# Patient Record
Sex: FEMALE | Race: WHITE | Employment: OTHER | URBAN - METROPOLITAN AREA
[De-identification: names, ages, dates, MRNs, and addresses within clinical notes are randomized per-mention and may not be internally consistent; named-entity substitution may affect disease eponyms.]

---

## 2018-02-01 ENCOUNTER — HOSPITAL ENCOUNTER (OUTPATIENT)
Dept: LAB | Age: 83
Discharge: HOME | End: 2018-02-01
Payer: MEDICARE

## 2018-02-01 ENCOUNTER — OFFICE VISIT (OUTPATIENT)
Dept: FAMILY MEDICINE CLINIC | Age: 83
End: 2018-02-01

## 2018-02-01 VITALS
BODY MASS INDEX: 33.7 KG/M2 | TEMPERATURE: 97.8 F | WEIGHT: 190.2 LBS | HEIGHT: 63 IN | HEART RATE: 71 BPM | DIASTOLIC BLOOD PRESSURE: 70 MMHG | OXYGEN SATURATION: 98 % | RESPIRATION RATE: 18 BRPM | SYSTOLIC BLOOD PRESSURE: 152 MMHG

## 2018-02-01 DIAGNOSIS — M10.9 GOUT, UNSPECIFIED CAUSE, UNSPECIFIED CHRONICITY, UNSPECIFIED SITE: ICD-10-CM

## 2018-02-01 DIAGNOSIS — I10 HTN, GOAL BELOW 150/90: ICD-10-CM

## 2018-02-01 DIAGNOSIS — B18.2 CHRONIC HEPATITIS C WITHOUT HEPATIC COMA (HCC): ICD-10-CM

## 2018-02-01 DIAGNOSIS — Z13.820 SCREENING FOR OSTEOPOROSIS: ICD-10-CM

## 2018-02-01 DIAGNOSIS — Z23 ENCOUNTER FOR IMMUNIZATION: ICD-10-CM

## 2018-02-01 DIAGNOSIS — Z00.00 ENCOUNTER FOR MEDICARE ANNUAL WELLNESS EXAM: Primary | ICD-10-CM

## 2018-02-01 DIAGNOSIS — Z78.0 POSTMENOPAUSAL: ICD-10-CM

## 2018-02-01 PROCEDURE — 36415 COLL VENOUS BLD VENIPUNCTURE: CPT

## 2018-02-01 PROCEDURE — 87521 HEPATITIS C PROBE&RVRS TRNSC: CPT

## 2018-02-01 PROCEDURE — 84550 ASSAY OF BLOOD/URIC ACID: CPT

## 2018-02-01 PROCEDURE — 86803 HEPATITIS C AB TEST: CPT

## 2018-02-01 PROCEDURE — 80053 COMPREHEN METABOLIC PANEL: CPT

## 2018-02-01 RX ORDER — OMEPRAZOLE 20 MG/1
20 CAPSULE, DELAYED RELEASE ORAL AS NEEDED
COMMUNITY
End: 2018-02-14 | Stop reason: SDUPTHER

## 2018-02-01 RX ORDER — VALSARTAN 160 MG/1
320 TABLET ORAL DAILY
COMMUNITY
End: 2018-02-12 | Stop reason: SDUPTHER

## 2018-02-01 RX ORDER — METOPROLOL TARTRATE 50 MG/1
50 TABLET ORAL 2 TIMES DAILY
COMMUNITY
End: 2018-02-01

## 2018-02-01 RX ORDER — ALLOPURINOL 100 MG/1
TABLET ORAL
Refills: 1 | COMMUNITY
Start: 2018-01-18 | End: 2018-02-12 | Stop reason: SDUPTHER

## 2018-02-01 RX ORDER — HYDROCHLOROTHIAZIDE 25 MG/1
12.5 TABLET ORAL
COMMUNITY
End: 2018-02-12 | Stop reason: SDUPTHER

## 2018-02-01 RX ORDER — METOPROLOL SUCCINATE 50 MG/1
50 TABLET, EXTENDED RELEASE ORAL DAILY
COMMUNITY
End: 2018-02-12 | Stop reason: SDUPTHER

## 2018-02-01 RX ORDER — NITROGLYCERIN 0.4 MG/1
0.4 TABLET SUBLINGUAL
COMMUNITY
End: 2018-02-14 | Stop reason: SDUPTHER

## 2018-02-01 RX ORDER — METOPROLOL TARTRATE 25 MG/1
50 TABLET, FILM COATED ORAL DAILY
COMMUNITY
End: 2018-02-01

## 2018-02-01 RX ORDER — METRONIDAZOLE 10 MG/G
GEL TOPICAL
Refills: 2 | COMMUNITY
Start: 2017-12-18 | End: 2018-04-21

## 2018-02-01 RX ORDER — DICLOFENAC POTASSIUM 50 MG/1
50 TABLET, FILM COATED ORAL
COMMUNITY
End: 2020-02-18 | Stop reason: SDUPTHER

## 2018-02-01 NOTE — PROGRESS NOTES
Patient Name: Kasandra Rodriguez   MRN: [id number]    Aaliyah Wilson is a 80 y.o. female who presents with the following: Here to establish care with new PCP. Patient was offered certified  services; patient refused and signed informed refusal form. Interview was conducted between provider and patient's daughter. Transferring care from her prior PCP. Has a history of chronic kidney disease and gout. Her gout overall has remained under control until recently, she had a gout flareup in her right knee which required diclofenac, and an old prescription of a narcotic. States that her symptoms have improved at this time. Takes allopurinol 200 mg daily; has not increased the dose in the past.  Has a history of hepatitis C secondary to a blood transfusion. Daughter reports that she never received treatment but has had monitoring of her liver function and ultrasound about 6 months ago. No history of stroke or heart attack. Is currently on hydrochlorothiazide which is helpful for blood pressure as well as some leg swelling. Daughter reports that she is up-to-date on her tetanus and pneumonia vaccines. Has never had a DEXA scan before to screen for osteoporosis. Review of Systems   Constitutional: Negative for chills, fever, malaise/fatigue and weight loss. HENT: Negative for hearing loss, nosebleeds and sore throat. Respiratory: Negative for cough, sputum production, shortness of breath and wheezing. Cardiovascular: Negative for chest pain, palpitations, leg swelling and PND. Gastrointestinal: Negative for abdominal pain, blood in stool, constipation, diarrhea, nausea and vomiting. Genitourinary: Negative for dysuria, frequency and urgency. Musculoskeletal: Positive for joint pain. Negative for back pain, falls, myalgias and neck pain. Skin: Negative for itching and rash.    Neurological: Negative for dizziness, sensory change, focal weakness and loss of consciousness. Psychiatric/Behavioral: Negative for depression. The patient is not nervous/anxious. All other systems reviewed and are negative. The patient's medications, allergies, past medical history, surgical history, family history and social history were reviewed and updated where appropriate. Prior to Admission medications    Medication Sig Start Date End Date Taking? Authorizing Provider   allopurinol (ZYLOPRIM) 100 mg tablet TK 1 T PO BID 1/18/18  Yes Historical Provider   metroNIDAZOLE 1 % glwp DANYELLE EXT AA QD 12/18/17  Yes Historical Provider   diclofenac potassium (CATAFLAM) 50 mg tablet Take 50 mg by mouth two (2) times daily as needed. Yes Historical Provider   nitroglycerin (NITROSTAT) 0.4 mg SL tablet 0.4 mg by SubLINGual route every five (5) minutes as needed for Chest Pain. Yes Historical Provider   metoprolol tartrate (LOPRESSOR) 25 mg tablet Take 50 mg by mouth daily. Yes Historical Provider   omeprazole (PRILOSEC) 20 mg capsule Take 20 mg by mouth two (2) times a day. Yes Historical Provider   valsartan (DIOVAN) 160 mg tablet Take 160 mg by mouth two (2) times a day. Yes Historical Provider   hydroCHLOROthiazide (HYDRODIURIL) 25 mg tablet Take 12.5 mg by mouth. Yes Historical Provider       Allergies   Allergen Reactions    Ace Inhibitors Cough    Pcn [Penicillins] Rash           OBJECTIVE    Visit Vitals    /70 (BP 1 Location: Right arm, BP Patient Position: Sitting)    Pulse 71    Temp 97.8 °F (36.6 °C) (Oral)    Resp 18    Ht 5' 3\" (1.6 m)    Wt 190 lb 3.2 oz (86.3 kg)    SpO2 98%    BMI 33.69 kg/m2       Physical Exam   Constitutional: She is well-developed, well-nourished, and in no distress. No distress. Eyes: Conjunctivae and EOM are normal. Pupils are equal, round, and reactive to light. Cardiovascular: Normal rate, regular rhythm and normal heart sounds. Exam reveals no gallop and no friction rub. No murmur heard.   Pulmonary/Chest: Effort normal and breath sounds normal. No respiratory distress. She has no wheezes. Musculoskeletal:        Right knee: Normal. She exhibits normal range of motion, no swelling and no effusion. No tenderness found. Left knee: Normal. She exhibits normal range of motion, no swelling and no effusion. No tenderness found. Skin: She is not diaphoretic. Psychiatric: Mood, memory, affect and judgment normal.   Nursing note and vitals reviewed. ASSESSMENT AND PLAN  Brittany Garcia is a 80 y.o. female who presents today for:    1. Encounter for Medicare annual wellness exam  See other note. 2. Postmenopausal  - DEXA BONE DENSITY STUDY AXIAL; Future    3. Screening for osteoporosis  - DEXA BONE DENSITY STUDY AXIAL; Future    4. HTN, goal below 150/90  Mildly elevated today but will keep medications the same given that it is our first initial visit. Discussed with daughter that HCTZ may increase her risk of gout therefore may consider switching this medication in the future; daughter is hesitant given her history of leg swelling. 5. Gout, unspecified cause, unspecified chronicity, unspecified site  If uric acid is not at goal, consider increasing allopurinol to 300 mg daily.  - METABOLIC PANEL, COMPREHENSIVE  - URIC ACID    6. Chronic hepatitis C without hepatic coma ([facility] [address]  Will obtain prior records of last abdominal ultrasound as well as updated HCV labs. - HCV RNA REJI QUALITATIVE  - CHRONIC HEPATITIS PANEL    7. Encounter for immunization  Will review prior records.   - Influenza virus vaccine ([address]) 72 years and older ([address]34)  - Administration fee () for Medicare insured patients       Medications Discontinued During This Encounter   Medication Reason    naproxen (NAPROSYN) 375 mg tablet Not A Current Medication    metoprolol tartrate (LOPRESSOR) 25 mg tablet Not A Current Medication    metoprolol tartrate (LOPRESSOR) 50 mg tablet Not A Current Medication       Follow-up Disposition:  Return in about 3 months (around 5/1/2018) for HTN follow up. Medication risks/benefits/costs/interactions/alternatives discussed with patient. Advised patient to call back or return to office if symptoms worsen/change/persist. If patient cannot reach us or should anything more severe/urgent arise he/she should proceed directly to the nearest emergency department. Discussed expected course/resolution/complications of diagnosis in detail with patient. Patient given a written after visit summary which includes his/her diagnoses, current medications and vitals. Patient expressed understanding with the diagnosis and plan.      Candelaria Bearden M.D.

## 2018-02-01 NOTE — PROGRESS NOTES
Chief Complaint   Patient presents with    New Patient   [address] transfer     1. Have you been to the ER, urgent care clinic since your last visit? Hospitalized since your last visit? No    2. Have you seen or consulted any other health care providers outside of the [address] [place] [address] since your last visit? Include any pap smears or colon screening.  No

## 2018-02-01 NOTE — PATIENT INSTRUCTIONS
???????: ??????????? ?????????? - [ Gout: Care Instructions ]  ?????????? ?? ??????? ?????  ???????  ??? ????? ???????, ????????????? ??????????? ?????????? ??????? ??????? ? ???????. ??? ???????? ????????? ???????? ????, ?????????, ??????????? ? ???????????????, ?????? ? ????? ???????, ???????? ???????? ?????? ????.  ??????? ?????? ????????? ??? ???????. ?? ?? ????? ????????????? ???????????? ???????? (???????? ????) ??? ????????????? ? ???????? ????. ????? ???????????? ????????, ???????? ?????????? ??? ????????, ????? ????? ??????? ??????? ???????.  ????? ???????? ? ???????????? ? ????????????? ? ?????????? ?????????? ????? ????? ?????? ????????????? ???????? ??????? ? ???????.  ??????????? ?????????? ? ????  ?????? ????? ?????? ??????? ? ????? ?????? ????????.??????????? ????????? ?????, ???? ??? ??? ???????? ?????. ??? ????????? ??????? ????????? ?????? ?????. ????????????? ????? ?????? ????????, ??????? ?? ??????????, ? ????? ?? ????? ?????????? ????? ????????.  ??? ???????? ? ???????? ?????????  · ???? ?????? ???????, ????????????? ? ???? ??? ??? ???????? ???????? ???????? ?? 1020 ?????. ?????? ?? ????? ??????? ???????????? ? ???? ????? ?????? ?????.  · ????????????? ??????? ??? ??????? ????, ????? ?? ????????????? ? ??? ???, ? ????? ??????, ????? ?? ?????? ??? ?????? ? ??????? ?????? 3 ????. ?????????? ??????? ???? ???? ?????? ??????. ??? ??????? ????????? ????.  · ??????? ???????? ??????? ????????. ? ??????? ?????????? ???? ?? ?????????? ????????, ??????? ???????? ? ???????? ??? ?????????? ???? ????????. ? ??????? ??? ??????????? ???????? ???????? ??? ?????? ?? ???????? ????? ????????????.  · ?????????? ????????? ?????? ???????? ??????????? ?????. ????????? ?????? ?????, ???? ????????, ??? ? ??? ????????? ???????? ??-?? ????????.  · ?????????? ?????????????? ????????? ? ???????????? ? ??????????.  ¨ ???? ???? ???????? ??? ?????????????? ????????, ?????????? ??? ???????? ??????????? ?????.  ¨ ???? ??? ?? ???????? ?????????????? ????????, ???????? ???????? ?????, ????? ?? ??? ????????? ????????, ??????????? ??? ???????.  · ????? ?????? ????????????? ? ???????? ????.  · ??????????????????? ?? ????? ??????, ?????? ??? ??????????? ????????.  · ???????? ????, ???? ? ??? ??????? ?????????? ???, ????? ?????? ????????? ???????? ???????. ?? ?? ?????????? ????????? ??????? ?????. ???????????? ???????? ???? ?? ???????? ????? ????? ??????? ??????? ???????.  ????? ????? ?????????? ?? ????????  ?????????? ??????? ???????? ??????????? ?? ??????? ??????????? ???????:  · ??? ????????? ??????? ???????????;  · ???? ?????? ????????? ???????????, ??? ???????? ? ??? ??????????;  · ??? ?????????, ???????????? ????????, ???????????, ??????? ????????? ??????????? ??? ??????? ???? ? ????? ??? ????? ????????.  ??????????? ?????????? ?? ??????????? ?????? ????????? ? ??????????? ??????????? ? ???????? ?????, ????:  · ??????? ???? ? ???????.  · ???? ???????? ??????????? ??? ?? ?????????? ????? 34 ???.  ??? ????? ???????? ?????????????? ???????????  ????????? [email or web address] ????? W381 ? ?????? ?????? ?????????????? ?????????? \"???????: ??????????? ?????????? - [ Gout: Care Instructions ]. \"  ??????? ?????? ??: ??????? 31, 2016  ?????? ????????: 11.4  © 4821-0140 Healthwise, Incorporated. ??????????? ??????????, ?????????????? ? ???????????? ? ???????? ???????? ????? ??????. ???? ? ??? ????????? ??????? ? ?????-???? ??????????? ??? ?? ??????????, ?????????? ? ?????? ??????????, ??????? ?? ?????? ?????. ????????  Healthwise, Incorporated ?? ???? ???????? ? ?? ????? ??????? ??????????????? ?? ??????????? ????????????? ?????? ??????????.

## 2018-02-01 NOTE — ACP (ADVANCE CARE PLANNING)
Advance Care Planning:   Patient was offered the opportunity to discuss advance care planning:  no     Does patient have an Advance Directive:  no   If no, did you provide information on Caring Connections? yes     Conducted ACP discussion today. Advanced Care Planning Information Card copy provided to patient; he/she will reach out to NN/facilitator to review.

## 2018-02-01 NOTE — MR AVS SNAPSHOT
[address] [place] [address] [address] 
[phone] Patient: Madyson Perla MRN: [id number] :10/5/1929 Visit Information Date & Time Provider Department Dept. Phone Encounter #  
 2018  3:00 PM Celia Kirby  [place] [phone] [id number] Follow-up Instructions Return in about 3 months (around 2018) for HTN follow up. Upcoming Health Maintenance Date Due DTaP/Tdap/Td series (1 - Tdap) 10/5/1950 ZOSTER VACCINE AGE 60> 1989 GLAUCOMA SCREENING Q2Y 10/5/1994 OSTEOPOROSIS SCREENING (DEXA) 10/5/1994 Pneumococcal 65+ Low/Medium Risk (1 of 2 - PCV13) 10/5/1994 MEDICARE YEARLY EXAM 10/5/1994 Influenza Age 5 to Adult 2017 Allergies as of 2018  Review Complete On: 2018 By: Jeovanny Santiago Severity Noted Reaction Type Reactions Ace Inhibitors  10/10/2011    Cough Pcn [Penicillins]  10/10/2011    Rash Current Immunizations  Never Reviewed No immunizations on file. Not reviewed this visit You Were Diagnosed With   
  
 Codes Comments Postmenopausal    -  Primary ICD-10-CM: Z78.0 ICD-9-CM: V49.81 Screening for osteoporosis     ICD-10-CM: Z13.820 ICD-9-CM: V82.81   
 HTN, goal below 150/90     ICD-10-CM: I10 
ICD-9-CM: 401.9 Gout, unspecified cause, unspecified chronicity, unspecified site     ICD-10-CM: M10.9 ICD-9-CM: 274.9 Chronic hepatitis C without hepatic coma (HCC)     ICD-10-CM: B18.2 ICD-9-CM: 070.54 Vitals BP Pulse Temp Resp Height(growth percentile) Weight(growth percentile) 152/70 (BP 1 Location: Right arm, BP Patient Position: Sitting) 71 97.8 °F (36.6 °C) (Oral) 18 5' 3\" (1.6 m) 190 lb 3.2 oz (86.3 kg) SpO2 BMI Smoking Status 98% 33.69 kg/m2 Never Smoker Vitals History BMI and BSA Data  Body Mass Index Body Surface Area  
 33.69 kg/m 2 1.96 m 2  
  
  
 Preferred Pharmacy Pharmacy Name Phone [facility] [facility], [address] [place] [address] [phone] Your Updated Medication List  
  
   
This list is accurate as of: 2/1/18  3:45 PM.  Always use your most recent med list.  
  
  
  
  
 allopurinol 100 mg tablet Commonly known as:  Donald Umana TK 1 T PO BID  
  
 diclofenac potassium 50 mg tablet Commonly known as:  CATAFLAM  
Take 50 mg by mouth two (2) times daily as needed. hydroCHLOROthiazide 25 mg tablet Commonly known as:  HYDRODIURIL Take 12.5 mg by mouth.  
  
 metoprolol succinate 50 mg XL tablet Commonly known as:  TOPROL-XL Take  by mouth daily. metoprolol tartrate 50 mg tablet Commonly known as:  LOPRESSOR Take 50 mg by mouth two (2) times a day. metroNIDAZOLE 1 % Glwp DANYELLE EXT AA QD  
  
 nitroglycerin 0.4 mg SL tablet Commonly known as:  NITROSTAT  
0.4 mg by SubLINGual route every five (5) minutes as needed for Chest Pain. omeprazole 20 mg capsule Commonly known as:  PRILOSEC Take 20 mg by mouth two (2) times a day. valsartan 160 mg tablet Commonly known as:  DIOVAN Take 320 mg by mouth daily. We Performed the Following CHRONIC HEPATITIS PANEL [[id number] Custom] HCV RNA REJI QUALITATIVE [32288 CPT(R)] METABOLIC PANEL, COMPREHENSIVE [58965 CPT(R)] URIC ACID [id number] CPT(R)] Follow-up Instructions Return in about 3 months (around 5/1/2018) for HTN follow up. To-Do List   
 02/01/2018 Imaging:  DEXA BONE DENSITY STUDY AXIAL Patient Instructions   
 
  
???????: ??????????? ?????????? - [ Gout: Care Instructions ] ?????????? ?? ??????? ????? 
???????  ??? ????? ???????, ????????????? ??????????? ?????????? ??????? ??????? ? ???????. ??? ???????? ????????? ???????? ????, ?????????, ??????????? ? ???????????????, ?????? ? ????? ???????, ???????? ???????? ?????? ????. 
 ??????? ?????? ????????? ??? ???????. ?? ?? ????? ????????????? ???????????? ???????? (???????? ????) ??? ????????????? ? ???????? ????. ????? ???????????? ????????, ???????? ?????????? ??? ????????, ????? ????? ??????? ??????? ???????. 
????? ???????? ? ???????????? ? ????????????? ? ?????????? ?????????? ????? ????? ?????? ????????????? ???????? ??????? ? ???????. 
??????????? ?????????? ? ????  ?????? ????? ?????? ??????? ? ????? ?????? ????????.??????????? ????????? ?????, ???? ??? ??? ???????? ?????. ??? ????????? ??????? ????????? ?????? ?????. ????????????? ????? ?????? ????????, ??????? ?? ??????????, ? ????? ?? ????? ?????????? ????? ????????. 
??? ???????? ? ???????? ????????? 
· ???? ?????? ???????, ????????????? ? ???? ??? ??? ???????? ???????? ???????? ?? 1020 ?????. ?????? ?? ????? ??????? ???????????? ? ???? ????? ?????? ?????. 
· ????????????? ??????? ??? ??????? ????, ????? ?? ????????????? ? ??? ???, ? ????? ??????, ????? ?? ?????? ??? ?????? ? ??????? ?????? 3 ????. ?????????? ??????? ???? ???? ?????? ??????. ??? ??????? ????????? ????. 
· ??????? ???????? ??????? ????????. ? ??????? ?????????? ???? ?? ?????????? ????????, ??????? ???????? ? ???????? ??? ?????????? ???? ????????. ? ??????? ??? ??????????? ???????? ???????? ??? ?????? ?? ???????? ????? ????????????. 
· ?????????? ????????? ?????? ???????? ??????????? ?????. ????????? ?????? ?????, ???? ????????, ??? ? ??? ????????? ???????? ??-?? ????????. 
· ?????????? ?????????????? ????????? ? ???????????? ? ??????????. 
¨ ???? ???? ???????? ??? ?????????????? ????????, ?????????? ??? ???????? ??????????? ?????. 
¨ ???? ??? ?? ???????? ?????????????? ????????, ???????? ???????? ?????, ????? ?? ??? ????????? ????????, ??????????? ??? ???????. 
· ????? ?????? ????????????? ? ???????? ????. 
· ??????????????????? ?? ????? ??????, ?????? ??? ??????????? ????????. 
· ???????? ????, ???? ? ??? ??????? ?????????? ???, ????? ?????? ????????? ???????? ???????. ?? ?? ?????????? ????????? ??????? ?????. ???????????? ???????? ???? ?? ???????? ????? ????? ??????? ??????? ???????. 
????? ????? ?????????? ?? ???????? 
?????????? ??????? ???????? ??????????? ?? ??????? ??????????? ???????: 
· ??? ????????? ??????? ???????????; 
· ???? ?????? ????????? ???????????, ??? ???????? ? ??? ??????????; 
· ??? ?????????, ???????????? ????????, ???????????, ??????? ????????? ??????????? ??? ??????? ???? ? ????? ??? ????? ????????. 
??????????? ?????????? ?? ??????????? ?????? ????????? ? ??????????? ??????????? ? ???????? ?????, ????: 
· ??????? ???? ? ???????. 
· ???? ???????? ??????????? ??? ?? ?????????? ????? 34 ???. 
??? ????? ???????? ?????????????? ??????????? 
????????? [email or web address] ????? W680 ? ?????? ?????? ?????????????? ?????????? \"???????: ??????????? ?????????? - [ Gout: Care Instructions ]. \" 
??????? ?????? ??: ??????? 31, 2016 
?????? ????????: 11.4 © [id number] Healthwise, Incorporated. ??????????? ??????????, ?????????????? ? ???????????? ? ???????? ???????? ????? ??????. ???? ? ??? ????????? ??????? ? ?????-???? ??????????? ??? ?? ??????????, ?????????? ? ?????? ??????????, ??????? ?? ?????? ?????. ???????? [employer]wise, Incorporated ?? ???? ???????? ? ?? ????? ??????? ??????????????? ?? ??????????? ????????????? ?????? ??????????. Introducing [facility]! [facility] introduces hoohbe patient portal. Now you can access parts of your medical record, email your doctor's office, and request medication refills online. 1. In your internet browser, go to [email or web address] 2. Click on the First Time User? Click Here link in the Sign In box. You will see the New Member Sign Up page. 3. Enter your hoohbe Access Code exactly as it appears below. You will not need to use this code after youve completed the sign-up process.  If you do not sign up before the expiration date, you must request a new code. 
 
· [employer] Access Code: [email or web address] Expires: 5/2/2018  2:43 PM 
 
4. Enter the last four digits of your Social Security Number (xxxx) and Date of Birth (mm/dd/yyyy) as indicated and click Submit. You will be taken to the next sign-up page. 5. Create a [employer] ID. This will be your [employer] login ID and cannot be changed, so think of one that is secure and easy to remember. 6. Create a [employer] password. You can change your password at any time. 7. Enter your Password Reset Question and Answer. This can be used at a later time if you forget your password. 8. Enter your e-mail address. You will receive e-mail notification when new information is available in [address] 9. Click Sign Up. You can now view and download portions of your medical record. 10. Click the Download Summary menu link to download a portable copy of your medical information. If you have questions, please visit the Frequently Asked Questions section of the [employer] website. Remember, [employer] is NOT to be used for urgent needs. For medical emergencies, dial 911. Now available from your iPhone and Android! Please provide this summary of care documentation to your next provider. Your primary care clinician is listed as Cherri Alexis. If you have any questions after today's visit, please call [phone].

## 2018-02-01 NOTE — PROGRESS NOTES
Ciaran Guaman is a 80 y.o. female and presents for annual Medicare Wellness Visit. Problem List: Reviewed with patient and discussed risk factors. Patient Active Problem List   Diagnosis Code    HTN, goal below 150/90 I10    Gout M10.9    Chronic hepatitis C without hepatic coma (HCC) B18.2    Osteoarthritis M19.90       Current medical providers:  Patient Care Team:  Dawit Novak MD as PCP - General (Family Practice)  Yisel Mejia MD as Physician (Internal Medicine)    PSH: Reviewed with patient  Past Surgical History:   Procedure Laterality Date    HX HYSTERECTOMY          SH: Reviewed with patient  Social History   Substance Use Topics    Smoking status: Never Smoker    Smokeless tobacco: Never Used    Alcohol use No       FH: Reviewed with patient  Family History   Problem Relation Age of Onset    No Known Problems Mother     No Known Problems Father        Medications/Allergies: Reviewed with patient  No current outpatient prescriptions on file prior to visit. No current facility-administered medications on file prior to visit. Allergies   Allergen Reactions    Ace Inhibitors Cough    Pcn [Penicillins] Rash       Objective:  Visit Vitals    /70 (BP 1 Location: Right arm, BP Patient Position: Sitting)    Pulse 71    Temp 97.8 °F (36.6 °C) (Oral)    Resp 18    Ht 5' 3\" (1.6 m)    Wt 190 lb 3.2 oz (86.3 kg)    SpO2 98%    BMI 33.69 kg/m2    Body mass index is 33.69 kg/(m^2). Assessment of cognitive impairment: Alert and oriented x 3    Depression Screen:   PHQ over the last two weeks 2/1/2018   Little interest or pleasure in doing things Not at all   Feeling down, depressed or hopeless Not at all   Total Score PHQ 2 0       Fall Risk Assessment:    Fall Risk Assessment, last 12 mths 2/1/2018   Able to walk? Yes   Fall in past 12 months? Yes   Fall with injury?  Yes   Number of falls in past 12 months 1   Fall Risk Score 2       Functional Ability:   Does the patient exhibit a steady gait? yes   How long did it take the patient to get up and walk from a sitting position? 3   Is the patient self reliant?  (ie can do own laundry, meals, household chores)  yes     Does the patient handle his/her own medications? yes     Does the patient handle his/her own money? yes     Is the patients home safe (ie good lighting, handrails on stairs and bath, etc.)? yes     Did you notice or did patient express any hearing difficulties? no     Did you notice or did patient express any vision difficulties?   no     Were distance and reading eye charts used? no       Advance Care Planning:   Patient was offered the opportunity to discuss advance care planning:  yes     Does patient have an Advance Directive:  no   If no, did you provide information on Caring Connections?   yes       Plan:      Orders Placed This Encounter    DEXA BONE DENSITY STUDY AXIAL    Influenza virus vaccine (FLUZONE HIGH-DOSE) 72 years and older (09323)    METABOLIC PANEL, COMPREHENSIVE    URIC ACID    HCV RNA REJI QUALITATIVE    CHRONIC HEPATITIS PANEL    Administration fee () for Medicare insured patients    allopurinol (ZYLOPRIM) 100 mg tablet    metroNIDAZOLE 1 % glwp    diclofenac potassium (CATAFLAM) 50 mg tablet    nitroglycerin (NITROSTAT) 0.4 mg SL tablet    DISCONTD: metoprolol tartrate (LOPRESSOR) 25 mg tablet    omeprazole (PRILOSEC) 20 mg capsule    valsartan (DIOVAN) 160 mg tablet    hydroCHLOROthiazide (HYDRODIURIL) 25 mg tablet    metoprolol tartrate (LOPRESSOR) 50 mg tablet    metoprolol succinate (TOPROL-XL) 50 mg XL tablet       Health Maintenance   Topic Date Due    DTaP/Tdap/Td series (1 - Tdap) 10/05/1950    ZOSTER VACCINE AGE 60>  08/05/1989    GLAUCOMA SCREENING Q2Y  10/05/1994    OSTEOPOROSIS SCREENING (DEXA)  10/05/1994    Pneumococcal 65+ Low/Medium Risk (1 of 2 - PCV13) 10/05/1994    MEDICARE YEARLY EXAM  02/02/2019    Influenza Age 9 to Adult  Completed *Patient verbalized understanding and agreement with the plan. A copy of the After Visit Summary with personalized health plan was given to the patient today.

## 2018-02-02 ENCOUNTER — TELEPHONE (OUTPATIENT)
Dept: FAMILY MEDICINE CLINIC | Age: 83
End: 2018-02-02

## 2018-02-02 LAB
ALBUMIN SERPL-MCNC: 4 G/DL (ref 3.5–4.7)
ALBUMIN/GLOB SERPL: 1.4 {RATIO} (ref 1.2–2.2)
ALP SERPL-CCNC: 79 IU/L (ref 39–117)
ALT SERPL-CCNC: 39 IU/L (ref 0–32)
AST SERPL-CCNC: 59 IU/L (ref 0–40)
BILIRUB SERPL-MCNC: 0.7 MG/DL (ref 0–1.2)
BUN SERPL-MCNC: 38 MG/DL (ref 8–27)
BUN/CREAT SERPL: 30 (ref 12–28)
CALCIUM SERPL-MCNC: 9.8 MG/DL (ref 8.7–10.3)
CHLORIDE SERPL-SCNC: 102 MMOL/L (ref 96–106)
CO2 SERPL-SCNC: 22 MMOL/L (ref 18–29)
COMMENT, 144069: NORMAL
CREAT SERPL-MCNC: 1.28 MG/DL (ref 0.57–1)
GFR SERPLBLD CREATININE-BSD FMLA CKD-EPI: 37 ML/MIN/1.73
GFR SERPLBLD CREATININE-BSD FMLA CKD-EPI: 43 ML/MIN/1.73
GLOBULIN SER CALC-MCNC: 2.8 G/DL (ref 1.5–4.5)
GLUCOSE SERPL-MCNC: 98 MG/DL (ref 65–99)
HBV CORE AB SERPL QL IA: NEGATIVE
HBV CORE IGM SERPL QL IA: NEGATIVE
HBV E AB SERPL QL IA: NEGATIVE
HBV E AG SERPL QL IA: NEGATIVE
HBV SURFACE AB SER QL: NON REACTIVE
HBV SURFACE AG SERPL QL IA: NEGATIVE
HCV AB S/CO SERPL IA: >11 S/CO RATIO (ref 0–0.9)
HCV RNA SERPL QL NAA+PROBE: POSITIVE
INTERPRETATION: NORMAL
POTASSIUM SERPL-SCNC: 4.7 MMOL/L (ref 3.5–5.2)
PROT SERPL-MCNC: 6.8 G/DL (ref 6–8.5)
SODIUM SERPL-SCNC: 141 MMOL/L (ref 134–144)
URATE SERPL-MCNC: 6.8 MG/DL (ref 2.5–7.1)

## 2018-02-02 NOTE — TELEPHONE ENCOUNTER
Call to patient's daughter on phi form.  verified. She informed me patient began to have tooth ache last night. She denies swelling but there is tenderness to touch. She states she is actively looking for a dentist but in the meantime she would like for pcp to send in antibiotic for patient.  Advised eval may be needed but will send to pcp

## 2018-02-02 NOTE — TELEPHONE ENCOUNTER
Would recommend prn Tylenol and applying heat; would also use mouthwash BID. As long as she does not have fever or there is not pus drainage, would avoid abx for now but encourage close follow up with dentist soon so they can evaluate for infection.

## 2018-02-02 NOTE — TELEPHONE ENCOUNTER
Shawn Gaitan, daughter, is requesting an antibiotic for the pt tooth ache until she finds a dentist. Pt uses [address] on file.      Best contact number [phone]

## 2018-02-04 NOTE — PROGRESS NOTES
Please notify patient regarding their test results (may need to use telephone  or pt's daughter on HIPPA due to language barrier:    Hepatitis C virus remains present per blood work (pt already aware of diagnosis but has never received treatment before). There are also abnormally high liver markers. At this time, I recommend referral to hepatology Dr. Betty Santiago to discuss further testing and treatment. Please let me know if pt amenable so we can place referral order. She also is not immune to Hepatitis B; would recommend starting Hepatitis B vaccine series as she is at higher risk of jez Hepatitis B given her dx of Hepatitis C. Uric acid is mildly above goal; would hold off on changing allopurinol for now given CKD but if she has another episode, pt to call back as she may need to increase daily allopurinol dose from 200 mg to 300 mg. Kidney marker is high; unsure what baseline is. Would try low salt diet and avoid any NSAIDS (i.e. Advil or Aleve) as this can worsen kidney function. If pt prefers for me to explain hepatitis C rationale further, I'm happy to discuss further on the phone directly.

## 2018-02-05 NOTE — TELEPHONE ENCOUNTER
Call to cleo. patient's  verified. Informed her of recommendations.  She states all has been taken care of and they have established care with dentist

## 2018-02-07 ENCOUNTER — TELEPHONE (OUTPATIENT)
Dept: FAMILY MEDICINE CLINIC | Age: 83
End: 2018-02-07

## 2018-02-07 DIAGNOSIS — B18.2 CHRONIC HEPATITIS C WITHOUT HEPATIC COMA (HCC): Primary | ICD-10-CM

## 2018-02-07 NOTE — TELEPHONE ENCOUNTER
Called patient and talked to her daughter due to language barrier and explained lab results. They agreed to see Dr. Lucas Guillen for further treatment.

## 2018-02-07 NOTE — PROGRESS NOTES
Called patient and talked to her daughter due to language barrier and explained lab results. They agreed to see Dr. Krishan Whitlye for further treatment.

## 2018-02-12 RX ORDER — VALSARTAN 160 MG/1
320 TABLET ORAL DAILY
Qty: 180 TAB | Refills: 1 | Status: SHIPPED | OUTPATIENT
Start: 2018-02-12 | End: 2018-07-04 | Stop reason: SDUPTHER

## 2018-02-12 RX ORDER — METOPROLOL SUCCINATE 50 MG/1
50 TABLET, EXTENDED RELEASE ORAL DAILY
Qty: 90 TAB | Refills: 1 | Status: SHIPPED | OUTPATIENT
Start: 2018-02-12 | End: 2018-07-04 | Stop reason: SDUPTHER

## 2018-02-12 RX ORDER — DICLOFENAC POTASSIUM 50 MG/1
50 TABLET, FILM COATED ORAL
Qty: 60 TAB | Refills: 2 | Status: CANCELLED | OUTPATIENT
Start: 2018-02-12

## 2018-02-12 RX ORDER — METRONIDAZOLE 10 MG/G
GEL TOPICAL
Qty: 1 BOTTLE | Refills: 2 | Status: CANCELLED | OUTPATIENT
Start: 2018-02-12

## 2018-02-12 RX ORDER — OMEPRAZOLE 20 MG/1
20 CAPSULE, DELAYED RELEASE ORAL AS NEEDED
Qty: 30 CAP | Refills: 2 | Status: CANCELLED | OUTPATIENT
Start: 2018-02-12

## 2018-02-12 RX ORDER — HYDROCHLOROTHIAZIDE 25 MG/1
12.5 TABLET ORAL DAILY
Qty: 30 TAB | Refills: 3 | Status: CANCELLED | OUTPATIENT
Start: 2018-02-12

## 2018-02-12 RX ORDER — HYDROCHLOROTHIAZIDE 25 MG/1
12.5 TABLET ORAL DAILY
Qty: 90 TAB | Refills: 1 | Status: SHIPPED | OUTPATIENT
Start: 2018-02-12 | End: 2019-05-13 | Stop reason: SDUPTHER

## 2018-02-12 RX ORDER — METOPROLOL SUCCINATE 50 MG/1
50 TABLET, EXTENDED RELEASE ORAL DAILY
Qty: 30 TAB | Refills: 3 | Status: CANCELLED | OUTPATIENT
Start: 2018-02-12

## 2018-02-12 RX ORDER — ALLOPURINOL 100 MG/1
200 TABLET ORAL DAILY
Qty: 180 TAB | Refills: 1 | Status: SHIPPED | OUTPATIENT
Start: 2018-02-12 | End: 2019-08-19 | Stop reason: SDUPTHER

## 2018-02-12 RX ORDER — ALLOPURINOL 100 MG/1
100 TABLET ORAL 2 TIMES DAILY
Qty: 60 TAB | Refills: 1 | Status: CANCELLED | OUTPATIENT
Start: 2018-02-12

## 2018-02-12 RX ORDER — VALSARTAN 160 MG/1
320 TABLET ORAL DAILY
Qty: 30 TAB | Refills: 2 | Status: CANCELLED | OUTPATIENT
Start: 2018-02-12

## 2018-02-12 RX ORDER — NITROGLYCERIN 0.4 MG/1
0.4 TABLET SUBLINGUAL
Qty: 1 BOTTLE | Refills: 1 | Status: CANCELLED | OUTPATIENT
Start: 2018-02-12

## 2018-02-12 NOTE — TELEPHONE ENCOUNTER
Patients daughter  Jasmina Noon  called very upset that these prescriptions were not called in sooner. She had asked for all of her mothers medication be reordered at here last appt which was 18. She also wanted 90 day supply with 3 refills  [facility] # [id number]  Pharmacy on file verified  Requested Prescriptions     Pending Prescriptions Disp Refills    valsartan (DIOVAN) 160 mg tablet       Sig: Take 2 Tabs by mouth daily.  nitroglycerin (NITROSTAT) 0.4 mg SL tablet       Si Tab by SubLINGual route every five (5) minutes as needed for Chest Pain.  metoprolol succinate (TOPROL-XL) 50 mg XL tablet       Sig: Take 1 Tab by mouth daily.  hydroCHLOROthiazide (HYDRODIURIL) 25 mg tablet       Sig: Take 0.5 Tabs by mouth.  allopurinol (ZYLOPRIM) 100 mg tablet  1    metroNIDAZOLE 1 % glwp  2    omeprazole (PRILOSEC) 20 mg capsule       Sig: Take 1 Cap by mouth as needed.  diclofenac potassium (CATAFLAM) 50 mg tablet       Sig: Take 1 Tab by mouth two (2) times daily as needed.      Lian Dupont  18

## 2018-02-14 ENCOUNTER — DOCUMENTATION ONLY (OUTPATIENT)
Dept: FAMILY MEDICINE CLINIC | Age: 83
End: 2018-02-14

## 2018-02-14 RX ORDER — NITROGLYCERIN 0.4 MG/1
TABLET SUBLINGUAL
Qty: 30 TAB | Refills: 0 | Status: SHIPPED | OUTPATIENT
Start: 2018-02-14 | End: 2020-02-18 | Stop reason: SDUPTHER

## 2018-02-14 RX ORDER — OMEPRAZOLE 20 MG/1
20 CAPSULE, DELAYED RELEASE ORAL AS NEEDED
Qty: 90 CAP | Refills: 1 | Status: SHIPPED | OUTPATIENT
Start: 2018-02-14 | End: 2018-02-20 | Stop reason: SDUPTHER

## 2018-02-14 NOTE — PROGRESS NOTES
Reviewed records from former pcp Dr. Cheyanne Ruano. Chronic chest pains, uses prn NTG, pt does not want to go to cardiology. Med list is accurate, except prior list had prn trazodone 50 mg for insomnia; unsure if pt is still using it. Elevated A1c at 6.0 in 3/3017; consider repeat at future OV. Hx of untreated Hep C, PCP was monitoring AFP marker which remained high (last 24.2 ng/mL as of 3/2017). Has previously refused seeing liver doctor. Prior Cr in 3/2017 at 0.91, now increased; would minimize diclofenac and other NSAIDs. Daughter is a physician (Dr. Roosevelt Hollingsworth).

## 2018-02-15 LAB
AFP, SERUM, TUMOR MARKER, 002266: 24.2
ALT (SGPT)  (NASH), 13919: 40
AST SERPL W P-5'-P-CCNC: 58 U/L
HBA1C MFR BLD HPLC: 6 %
URATE MFR STONE: 7.3 %

## 2018-02-20 RX ORDER — OMEPRAZOLE 20 MG/1
20 CAPSULE, DELAYED RELEASE ORAL
Qty: 90 CAP | Refills: 1 | Status: SHIPPED | OUTPATIENT
Start: 2018-02-20 | End: 2018-07-04 | Stop reason: SDUPTHER

## 2018-05-03 ENCOUNTER — HOSPITAL ENCOUNTER (OUTPATIENT)
Dept: LAB | Age: 83
Discharge: HOME | End: 2018-05-03
Payer: MEDICARE

## 2018-05-03 ENCOUNTER — OFFICE VISIT (OUTPATIENT)
Dept: FAMILY MEDICINE CLINIC | Age: 83
End: 2018-05-03

## 2018-05-03 VITALS
HEIGHT: 63 IN | RESPIRATION RATE: 18 BRPM | DIASTOLIC BLOOD PRESSURE: 68 MMHG | WEIGHT: 188 LBS | SYSTOLIC BLOOD PRESSURE: 138 MMHG | OXYGEN SATURATION: 98 % | HEART RATE: 60 BPM | TEMPERATURE: 98.4 F | BODY MASS INDEX: 33.31 KG/M2

## 2018-05-03 DIAGNOSIS — B18.2 CHRONIC HEPATITIS C WITHOUT HEPATIC COMA (HCC): Primary | ICD-10-CM

## 2018-05-03 DIAGNOSIS — I10 HTN, GOAL BELOW 150/90: ICD-10-CM

## 2018-05-03 DIAGNOSIS — R60.0 LEG EDEMA: ICD-10-CM

## 2018-05-03 PROCEDURE — 36415 COLL VENOUS BLD VENIPUNCTURE: CPT

## 2018-05-03 PROCEDURE — 80048 BASIC METABOLIC PNL TOTAL CA: CPT

## 2018-05-03 PROCEDURE — 87522 HEPATITIS C REVRS TRNSCRPJ: CPT

## 2018-05-03 PROCEDURE — 83880 ASSAY OF NATRIURETIC PEPTIDE: CPT

## 2018-05-03 RX ORDER — FUROSEMIDE 20 MG/1
20 TABLET ORAL DAILY
Qty: 30 TAB | Refills: 0 | Status: SHIPPED | OUTPATIENT
Start: 2018-05-03 | End: 2018-09-14 | Stop reason: SDUPTHER

## 2018-05-03 RX ORDER — ACETAMINOPHEN AND CODEINE PHOSPHATE 300; 30 MG/1; MG/1
TABLET ORAL
Refills: 0 | COMMUNITY
Start: 2018-02-03 | End: 2019-08-19

## 2018-05-03 NOTE — PATIENT INSTRUCTIONS
????? DASH (??????????????? ?????? ? ??????? ??????????): ??????????? ?????????? - [ DASH Diet: Care Instructions ]  ?????????? ?? ??????? ?????  ????? DASH ???????????? ????? ???? ???????, ??????? ??????? ??????? ???????????? ????????. ?????????? DASH ???????? «??????????????? ?????? ? ??????? ??????????». ?????????? -- ??? ??????? ???????????? ????????.  ???????? ???? ????? DASH ??????????? ? ??????????? ?????????, ??????? ????????, ?????? ? ???????. ??? ???????? ????? ??????? ???????????? ????????. ??????????, ???????? ??????? ???????????? ??????????, ???????? ??????, ?????, ???????? ???????? ? ?????? ??????????? ????, ?????, ??????? ? ???????. ?????? ????? ??????? ??????? ? ????????, ?????? ? ??????? ?????? ???????????? ?????????, ??????? ??????? ??????????, ?? ???????? ?????? ?? ???????. ????? ????, ????? DASH ???????? ??????? ?????, ???? ? ?????.  ????? DASH ???????? ????? ?? ?????????? ??????????? ????????? ?????? ?????, ??????? ??? ???? ????? ??????????????? ??? ???????? ????????????? ????????. ????? ??? ????, ????????, ????????? ????????? ?????????? ?????? ? ???????????? ???????. ?????????? ?????????? ?????? ??? ?????????? ????? DASH ????? ??????? ???????????? ???????? ??? ???????, ??? ????? DASH ???? ?? ????.  ??????????? ?????????? ? ???? -- ?????? ????? ?????? ??????? ? ????? ?????? ????????.??????????? ????????? ?????, ???? ??? ??? ???????? ?????. ??? ????????? ??????? ????????? ?????? ?????. ????????????? ????? ?????? ????????, ??????? ?? ??????????, ? ????? ?? ????? ?????????? ????? ????????.  ??? ???????? ? ???????? ?????????  ?????????? ????? DASH  · ?????????? ???? 4-5 ?????? ??????? ?????? ????. ??????? ???????? 1 ??????? ?????? ???????? ???????, ½ ????? ????? ?????????? ??? ???????????????? ???????, 1/4 ????? ??????????? ??? 100 ? (½ ?????) ?????????? ????. ???????????? ??????? ???????? ???????, ? ?? ????????? ?????.  · ?????????? ???? 4-5 ?????? ?????? ?????? ????. ??????? ???????? 1 ????? ?????? ????? ??? ????? ???????? ??????, ½ ????? ????? ?????????? ??? ?????????? ???????????? ?????? ??? 100 ? (½ ?????) ???????? ????. ???????????? ??????? ???????? ??????, ? ?? ??????? ?????.  · ????????? ????? 2-3 ?????? ???????? ????????? ? ?????? ??????????? ????? ??? ????????????. ??????? ???????? 200 ? ??????, 1 ????? ??????? ??? 40 ? ????.  · ?????????? ???? 6-8 ?????? ???????? ?????? ????. ??????? ???????? 1 ????? ?????, 40 ? ????? ???????? ???? ??? ½ ????? ??????????????? ????, ??????? ??? ????. ?????????? ?? ??????????? ???????? ?????????????? ????????.  · ?????????? ??????????? ???????? ????, ????? ? ???? ?? 2 ?????? ?????????. ?????? ?????????? 90 ?, ??? ?? ???????? ????????????? ?????? ????.  · ?????? ?????? ???????? 4-5 ?????? ??????, ??????? ? ??????? (?????????????? ??????? ?????, ???????? ??? ???????? ??????). ??????? ???????? 1/3 ????? ??????, 2 ???????? ????? ??????? ??? ½ ????? ?????????????? ????? ??? ??????.  · ????????????? ???? ? ????? ?? 2-3 ?????? ?????? ????. ?????? ?????????? 1 ?????? ????? ????????????? ????? ??? 2 ???????? ????? ???????? ????????.  · ?????????? ??????? ? ?????????????? ????? ?? 5 ??? ????? ?????? ? ??????. ?????? ?????????? 1 ???????? ????? ???? ??? ?????, ½ ????? ??????? ??? 1 ????? ????????.  · ???????????? ????? 2300 ?????????? (??) ?????? ? ????. ???? ?????????? ??????????? ?????? ?? 1500 ?? ? ????, ?? ?????? ???????? ???????????? ???????? ??? ???????.  ???????????? ???????????? ??? ?????????? ??????  · ??????? ? ??????. ?? ????????? ????? ?? ?????????? ???????? ???? ??????. ??? ????? ???????? ? ????, ??? ??? ????? ?????? ?? ??????? ??????? ????, ??? ??????? ??????????? ???????????? ?????. ??????? ????????? ????????? ? ??????????????? ??. ??? ?????? ??? ????????? ?????? ?????????, ???????? ??? ????????? ?????????.  · ?????????? ??????? ????????? ?????????:  ¨ ???????? ????? ??????? ????? ??? ???? ? ?????? ??????? ???? ? ??? ?????????. ??? ???????? ????????? ???????????????? ?????????? ??????? ? ?????? ?????? ????.  ¨ ?????????? ???????????? ?????? ??? ????? ? ??????? ? ???????? ??????? ??? ????????? ???????.  ¨ ?????????? ? ?????????? ?????? ??????, ????????, ?????? ? ???.  ¨ ???????????? ??????? ?????? ??? ????? ? ???????? ????? ????????? ? ?????????? ???????. ?????????? ???????????? ????????, ?????, ??????, ????????, ???????, ??????? ??????? ? ???.  ¨ ? ???????? ?????, ??????????? ???????, ??????????? ????????????? ?????????? ????? ? ???????? ?????? ??????, ? ?? ????? ? ??????.  ¨ ????????? ?????? ???????? ????????????? ??? ????????? ???????. ??? ?????????? ????????? ??????? ???????? ????? ??? ??????? ? ?????????????? ??????.  ¨ ???????? ?????????????? ????? ? ?????? ? ???????. ?????????? ? ??????? ??? ??? ?????????? ??????. ???????? ??????? ? ???? ? ???? ?? ?????? ????? ??? ?????? ?????.  ??? ????? ???????? ?????????????? ???????????  ????????? [email or web address] ????? P725 ? ?????? ?????? ?????????????? ?????????? \"????? DASH (??????????????? ?????? ? ??????? ??????????): ??????????? ?????????? - [ DASH Diet: Care Instructions ]. \"  ??????? ?????? ??: ?????? 3, 2017  ?????? ????????: 11.4  © [phone] Healthwise, Incorporated. ??????????? ??????????, ?????????????? ? ???????????? ? ???????? ???????? ????? ??????. ???? ? ??? ????????? ??????? ? ?????-???? ??????????? ??? ?? ??????????, ?????????? ? ?????? ??????????, ??????? ?? ?????? ?????. ????????  Healthwise, Incorporated ?? ???? ???????? ? ?? ????? ??????? ??????????????? ?? ??????????? ????????????? ?????? ??????????.

## 2018-05-03 NOTE — PROGRESS NOTES
Chief Complaint   Patient presents with    Hypertension     follow up    Shortness of Breath     x few weeks    Leg Swelling     bilateral     1. Have you been to the ER, urgent care clinic since your last visit? Hospitalized since your last visit? No    2. Have you seen or consulted any other health care providers outside of the [address] [place] [address] since your last visit? Include any pap smears or colon screening.  No

## 2018-05-03 NOTE — LETTER
5/14/2018 4:23 PM 
 
Ms. Candi Cooley [facility] [address] Dear Candi Soliz: 
 
Please find your most recent results below. Resulted Orders HCV RT-PCR, QUANT (NON-GRAPH) Result Value Ref Range HEPATITIS C QUANTITATION [id number] IU/mL  
 HCV log10 6.086 log10 IU/mL  
 TEST INFORMATION Comment Comment:  
   The quantitative range of this assay is 15 IU/mL to 100 million IU/mL. Narrative Performed at:  [address]  [id number] : Joby Geronimo MD, Phone:  [phone] BNP Result Value Ref Range B-type Natriuretic Peptide 113.6 (H) 0.0 - 100.0 pg/mL Narrative Performed at:  [address]  [id number] : Joby Geronimo MD, Phone:  [phone] BASIC METABOLIC PANEL (7) Result Value Ref Range Glucose 91 65 - 99 mg/dL BUN 49 (H) 8 - 27 mg/dL Creatinine 1.63 (H) 0.57 - 1.00 mg/dL GFR est non-AA 28 (L) >59 mL/min/1.73 GFR est AA 32 (L) >59 mL/min/1.73  
 BUN/Creatinine ratio 30 (H) 12 - 28 Sodium 138 134 - 144 mmol/L Potassium 4.9 3.5 - 5.2 mmol/L Chloride 97 96 - 106 mmol/L  
 CO2 25 18 - 29 mmol/L Narrative Performed at:  [address]  [id number] : Joby Geronimo MD, Phone:  [phone] CKD REPORT Result Value Ref Range Interpretation Note Comment:  
   Supplemental report is available. Narrative Performed at:  [address] [place] [address]  [id number] : David Dinero MD, Phone:  [phone] RECOMMENDATIONS: 
Hepatitis C viral load is very high; as previously discussed, would recommend seeing hepatology to discuss management options.  Would also complete liver ultrasound as discussed during last visit. Kidney marker is also higher than it has been in the past.  Would use the Lasix for her leg edema very sparingly as this can damage the kidneys further with frequent use.  Given the increasing creatinine level, would consider nephrology referral.  
Heart marker called BNP is mildly elevated; given this finding and ongoing leg edema, would recommended updated echocardiogram (TTE) of the heart Please call me if you have any questions: [phone] Sincerely, Ole Gordon MD

## 2018-05-03 NOTE — MR AVS SNAPSHOT
[facility] 
 
 
 [address] 
[phone] Patient: Liam Hsieh MRN: [id number] :10/5/1929 Visit Information Date & Time Provider Department Dept. Phone Encounter #  
 5/3/2018  3:45 PM Zachary Scott  [facility] [phone] [id number] Follow-up Instructions Return in about 6 months (around 11/3/2018) for HTN follow up. Upcoming Health Maintenance Date Due DTaP/Tdap/Td series (1 - Tdap) 10/5/1950 ZOSTER VACCINE AGE 60> 1989 GLAUCOMA SCREENING Q2Y 10/5/1994 Bone Densitometry (Dexa) Screening 10/5/1994 Pneumococcal 65+ Low/Medium Risk (2 of 2 - PPSV23) 3/16/2018 Influenza Age 5 to Adult 2018 MEDICARE YEARLY EXAM 2019 Allergies as of 5/3/2018  Review Complete On: 5/3/2018 By: Zachary Scott MD  
  
 Severity Noted Reaction Type Reactions Ace Inhibitors  10/10/2011    Cough Pcn [Penicillins]  10/10/2011    Rash Current Immunizations  Never Reviewed Name Date Influenza High Dose Vaccine PF 2018 Pneumococcal Conjugate (PCV-13) 3/16/2017 Not reviewed this visit You Were Diagnosed With   
  
 Codes Comments Chronic hepatitis C without hepatic coma (HCC)    -  Primary ICD-10-CM: B18.2 ICD-9-CM: 070.54 HTN, goal below 150/90     ICD-10-CM: I10 
ICD-9-CM: 401.9 Leg edema     ICD-10-CM: R60.0 ICD-9-CM: 462. 3 Vitals BP Pulse Temp Resp Height(growth percentile) Weight(growth percentile)  
 138/68 (BP 1 Location: Right arm, BP Patient Position: Sitting) 60 98.4 °F (36.9 °C) (Oral) 18 5' 3\" (1.6 m) 188 lb (85.3 kg) SpO2 BMI OB Status Smoking Status 98% 33.3 kg/m2 Postmenopausal Never Smoker Vitals History BMI and BSA Data Body Mass Index Body Surface Area  
 33.3 kg/m 2 1.95 m 2 Preferred Pharmacy Pharmacy Name Phone [facility] [address] [place], [place] [phone] Your Updated Medication List  
  
   
This list is accurate as of 5/3/18  4:10 PM.  Always use your most recent med list.  
  
  
  
  
 acetaminophen-codeine 300-30 mg per tablet Commonly known as:  TYLENOL #3 TK 1 TO 2 TS PO Q 4 TO 6 H PRN FOR PAIN  
  
 allopurinol 100 mg tablet Commonly known as:  Geradine Colder Take 2 Tabs by mouth daily. diclofenac potassium 50 mg tablet Commonly known as:  CATAFLAM  
Take 50 mg by mouth two (2) times daily as needed. furosemide 20 mg tablet Commonly known as:  LASIX Take 1 Tab by mouth daily. PRN leg edema  
  
 hydroCHLOROthiazide 25 mg tablet Commonly known as:  HYDRODIURIL Take 0.5 Tabs by mouth daily. metoprolol succinate 50 mg XL tablet Commonly known as:  TOPROL-XL Take 1 Tab by mouth daily. metroNIDAZOLE 1 % topical gel Commonly known as:  Ángela Solitario Apply thin film to affected area once daily  
  
 nitroglycerin 0.4 mg SL tablet Commonly known as:  NITROSTAT Take 1 tablet SL at first sign of attack; repeat every 5 minutes if needed for a total of 3 tablets in 15 minutes; if no relief, call 911  
  
 omeprazole 20 mg capsule Commonly known as:  PRILOSEC Take 1 Cap by mouth daily as needed. PRN heartburn  
  
 valsartan 160 mg tablet Commonly known as:  DIOVAN Take 2 Tabs by mouth daily. Prescriptions Sent to Pharmacy Refills  
 furosemide (LASIX) 20 mg tablet 0 Sig: Take 1 Tab by mouth daily. PRN leg edema Class: Normal  
 Pharmacy: [facility] [address] [place] [address] Drive, [facility] [address] Ph #: [phone] Route: Oral  
  
We Performed the Following BASIC METABOLIC PANEL (7) [25164 CPT(R)] BNP L071146 CPT(R)] HCV RT-PCR, QUANT (NON-GRAPH) [id number] CPT(R)] Follow-up Instructions Return in about 6 months (around 11/3/2018) for HTN follow up. To-Do List   
 05/03/2018 Imaging:  [facility] Patient Instructions   
 
  
????? DASH (??????????????? ?????? ? ??????? ??????????): ??????????? ?????????? - [ DASH Diet: Care Instructions ] ?????????? ?? ??????? ????? 
????? DASH ???????????? ????? ???? ???????, ??????? ??????? ??????? ???????????? ????????. ?????????? DASH ???????? «??????????????? ?????? ? ??????? ??????????». ??????????  ??? ??????? ???????????? ????????. 
???????? ???? ????? DASH ??????????? ? ??????????? ?????????, ??????? ????????, ?????? ? ???????. ??? ???????? ????? ??????? ???????????? ????????. ??????????, ???????? ??????? ???????????? ??????????, ???????? ??????, ?????, ???????? ???????? ? ?????? ??????????? ????, ?????, ??????? ? ???????. ?????? ????? ??????? ??????? ? ????????, ?????? ? ??????? ?????? ???????????? ?????????, ??????? ??????? ??????????, ?? ???????? ?????? ?? ???????. ????? ????, ????? DASH ???????? ??????? ?????, ???? ? ?????. 
????? DASH ???????? ????? ?? ?????????? ??????????? ????????? ?????? ?????, ??????? ??? ???? ????? ??????????????? ??? ???????? ????????????? ????????. ????? ??? ????, ????????, ????????? ????????? ?????????? ?????? ? ???????????? ???????. ?????????? ?????????? ?????? ??? ?????????? ????? DASH ????? ??????? ???????????? ???????? ??? ???????, ??? ????? DASH ???? ?? ????. 
??????????? ?????????? ? ????  ?????? ????? ?????? ??????? ? ????? ?????? ????????.??????????? ????????? ?????, ???? ??? ??? ???????? ?????. ??? ????????? ??????? ????????? ?????? ?????. ????????????? ????? ?????? ????????, ??????? ?? ??????????, ? ????? ?? ????? ?????????? ????? ????????. 
??? ???????? ? ???????? ????????? 
?????????? ????? DASH 
· ?????????? ???? 45 ?????? ??????? ?????? ????. ??????? ???????? 1 ??????? ?????? ???????? ???????, ½ ????? ????? ?????????? ??? ???????????????? ???????, 1/4 ????? ??????????? ??? 100 ? (½ ?????) ?????????? ????. ???????????? ??????? ???????? ???????, ? ?? ????????? ?????. 
· ?????????? ???? 45 ?????? ?????? ?????? ????. ??????? ???????? 1 ????? ?????? ????? ??? ????? ???????? ??????, ½ ????? ????? ?????????? ??? ?????????? ???????????? ?????? ??? 100 ? (½ ?????) ???????? ????. ???????????? ??????? ???????? ??????, ? ?? ??????? ?????. 
· ????????? ????? 23 ?????? ???????? ????????? ? ?????? ??????????? ????? ??? ????????????. ??????? ???????? 200 ? ??????, 1 ????? ??????? ??? 40 ? ????. 
· ?????????? ???? 68 ?????? ???????? ?????? ????. ??????? ???????? 1 ????? ?????, 40 ? ????? ???????? ???? ??? ½ ????? ??????????????? ????, ??????? ??? ????. ?????????? ?? ??????????? ???????? ?????????????? ????????. 
· ?????????? ??????????? ???????? ????, ????? ? ???? ?? 2 ?????? ?????????. ?????? ?????????? 90 ?, ??? ?? ???????? ????????????? ?????? ????. 
· ?????? ?????? ???????? 45 ?????? ??????, ??????? ? ??????? (?????????????? ??????? ?????, ???????? ??? ???????? ??????). ??????? ???????? 1/3 ????? ??????, 2 ???????? ????? ??????? ??? ½ ????? ?????????????? ????? ??? ??????. 
· ????????????? ???? ? ????? ?? 23 ?????? ?????? ????. ?????? ?????????? 1 ?????? ????? ????????????? ????? ??? 2 ???????? ????? ???????? ????????. 
· ?????????? ??????? ? ?????????????? ????? ?? 5 ??? ????? ?????? ? ??????. ?????? ?????????? 1 ???????? ????? ???? ??? ?????, ½ ????? ??????? ??? 1 ????? ????????. 
· ???????????? ????? 2300 ?????????? (??) ?????? ? ????. ???? ?????????? ??????????? ?????? ?? 1500 ?? ? ????, ?? ?????? ???????? ???????????? ???????? ??? ???????. 
???????????? ???????????? ??? ?????????? ?????? 
· ??????? ? ??????. ?? ????????? ????? ?? ?????????? ???????? ???? ??????. ??? ????? ???????? ? ????, ??? ??? ????? ?????? ?? ??????? ??????? ????, ??? ??????? ??????????? ???????????? ?????. ??????? ????????? ????????? ? ??????????????? ??. ??? ?????? ??? ????????? ?????? ?????????, ???????? ??? ????????? ?????????. 
 · ?????????? ??????? ????????? ?????????: 
¨ ???????? ????? ??????? ????? ??? ???? ? ?????? ??????? ???? ? ??? ?????????. ??? ???????? ????????? ???????????????? ?????????? ??????? ? ?????? ?????? ????. 
¨ ?????????? ???????????? ?????? ??? ????? ? ??????? ? ???????? ??????? ??? ????????? ???????. 
¨ ?????????? ? ?????????? ?????? ??????, ????????, ?????? ? ???. 
¨ ???????????? ??????? ?????? ??? ????? ? ???????? ????? ????????? ? ?????????? ???????. ?????????? ???????????? ????????, ?????, ??????, ????????, ???????, ??????? ??????? ? ???. 
¨ ? ???????? ?????, ??????????? ???????, ??????????? ????????????? ?????????? ????? ? ???????? ?????? ??????, ? ?? ????? ? ??????. 
¨ ????????? ?????? ???????? ????????????? ??? ????????? ???????. ??? ?????????? ????????? ??????? ???????? ????? ??? ??????? ? ?????????????? ??????. 
¨ ???????? ?????????????? ????? ? ?????? ? ???????. ?????????? ? ??????? ??? ??? ?????????? ??????. ???????? ??????? ? ???? ? ???? ?? ?????? ????? ??? ?????? ?????. 
??? ????? ???????? ?????????????? ??????????? 
????????? [email or web address] ????? [phone] ? ?????? ?????? ?????????????? ?????????? \"????? DASH (??????????????? ?????? ? ??????? ??????????): ??????????? ?????????? - [ DASH Diet: Care Instructions ]. \" 
??????? ?????? ??: ?????? 3, 2017 
?????? ????????: 11.4 © [id number] Healthwise, Incorporated. ??????????? ??????????, ?????????????? ? ???????????? ? ???????? ???????? ????? ??????. ???? ? ??? ????????? ??????? ? ?????-???? ??????????? ??? ?? ??????????, ?????????? ? ?????? ??????????, ??????? ?? ?????? ?????. ???????? Healthwise, Incorporated ?? ???? ???????? ? ?? ????? ??????? ??????????????? ?? ??????????? ????????????? ?????? ??????????. Introducing [facility]! [facility] [employer] introduces MyChart patient portal. Now you can access parts of your medical record, email your doctor's office, and request medication refills online. 1. In your internet browser, go to [email or web address] 2. Click on the First Time User? Click Here link in the Sign In box. You will see the New Member Sign Up page. 3. Enter your TLabs Access Code exactly as it appears below. You will not need to use this code after youve completed the sign-up process. If you do not sign up before the expiration date, you must request a new code. · TLabs Access Code: [email or web address] Expires: 8/1/2018  4:07 PM 
 
4. Enter the last four digits of your Social Security Number (xxxx) and Date of Birth (mm/dd/yyyy) as indicated and click Submit. You will be taken to the next sign-up page. 5. Create a [employer] ID. This will be your TLabs login ID and cannot be changed, so think of one that is secure and easy to remember. 6. Create a TLabs password. You can change your password at any time. 7. Enter your Password Reset Question and Answer. This can be used at a later time if you forget your password. 8. Enter your e-mail address. You will receive e-mail notification when new information is available in [address] 9. Click Sign Up. You can now view and download portions of your medical record. 10. Click the Download Summary menu link to download a portable copy of your medical information. If you have questions, please visit the Frequently Asked Questions section of the TLabs website. Remember, TLabs is NOT to be used for urgent needs. For medical emergencies, dial 911. Now available from your iPhone and Android! Please provide this summary of care documentation to your next provider. Your primary care clinician is listed as Cherri Alexis. If you have any questions after today's visit, please call [phone].

## 2018-05-03 NOTE — PROGRESS NOTES
Patient Name: Julio Sykes   MRN: [id number]    Corby Paul is a 80 y.o. female who presents with the following: Here today with daughter who is interpreting for her. The patient has hypertension and CKD. She reports taking medications as instructed, no medication side effects noted, no TIA's, no chest pain on exertion, noting swelling of ankles. Diet and Lifestyle: generally follows a low fat low cholesterol diet, generally follows a low sodium diet, sedentary. Lab review: orders written for new lab studies as appropriate; see orders. Patient has intermittent bilateral ankle swelling which is a chronic issue. Previously has taken Lasix 40 mg as needed. Has been compliant with compression stockings which help. Daughter reports that she had a stress echocardiogram and ejection fraction was reported to be normal several years ago. Occasionally feels short of breath. No recent weight changes. Does have an active diagnosis of hepatitis C but daughter reports that patient would not want to start medications regardless. Has not yet seen the liver doctor. Wt Readings from Last 3 Encounters:   05/03/18 188 lb (85.3 kg)   02/01/18 190 lb 3.2 oz (86.3 kg)   10/10/11 184 lb (83.5 kg)       Review of Systems   Constitutional: Negative for fever, malaise/fatigue and weight loss. Respiratory: Negative for cough, hemoptysis, shortness of breath and wheezing. Cardiovascular: Positive for leg swelling. Negative for chest pain, palpitations and PND. Gastrointestinal: Negative for abdominal pain, constipation, diarrhea, nausea and vomiting. The patient's medications, allergies, past medical history, surgical history, family history and social history were reviewed and updated where appropriate. Prior to Admission medications    Medication Sig Start Date End Date Taking?  Authorizing Provider   acetaminophen-codeine (TYLENOL #3) 300-30 mg per tablet TK 1 TO 2 TS PO Q 4 TO 6 H PRN FOR PAIN 2/3/18  Yes Historical Provider   metroNIDAZOLE (METROGEL) 1 % topical gel Apply thin film to affected area once daily 4/21/18  Yes Cherri Alexis MD   omeprazole (PRILOSEC) 20 mg capsule Take 1 Cap by mouth daily as needed. PRN heartburn 2/20/18  Yes Lui Gomez MD   nitroglycerin (NITROSTAT) 0.4 mg SL tablet Take 1 tablet SL at first sign of attack; repeat every 5 minutes if needed for a total of 3 tablets in 15 minutes; if no relief, call 911 2/14/18  Yes Lui Gomez MD   allopurinol (ZYLOPRIM) 100 mg tablet Take 2 Tabs by mouth daily. 2/12/18  Yes Lui Gomez MD   hydroCHLOROthiazide (HYDRODIURIL) 25 mg tablet Take 0.5 Tabs by mouth daily. 2/12/18  Yes Lui Gomez MD   metoprolol succinate (TOPROL-XL) 50 mg XL tablet Take 1 Tab by mouth daily. 2/12/18  Yes Lui Gomez MD   valsartan (DIOVAN) 160 mg tablet Take 2 Tabs by mouth daily. 2/12/18  Yes Lui Gomez MD   diclofenac potassium (CATAFLAM) 50 mg tablet Take 50 mg by mouth two (2) times daily as needed. Yes Historical Provider       Allergies   Allergen Reactions    Ace Inhibitors Cough    Pcn [Penicillins] Rash           OBJECTIVE    Visit Vitals    /68 (BP 1 Location: Right arm, BP Patient Position: Sitting)    Pulse 60    Temp 98.4 °F (36.9 °C) (Oral)    Resp 18    Ht 5' 3\" (1.6 m)    Wt 188 lb (85.3 kg)    SpO2 98%    BMI 33.3 kg/m2       Physical Exam   Constitutional: She is oriented to person, place, and time and well-developed, well-nourished, and in no distress. No distress. Eyes: Conjunctivae and EOM are normal. Pupils are equal, round, and reactive to light. Cardiovascular: Normal rate, regular rhythm and normal heart sounds. Exam reveals no gallop and no friction rub. No murmur heard. Pulmonary/Chest: Effort normal and breath sounds normal. No respiratory distress. She has no wheezes. Musculoskeletal: She exhibits edema (Pitting edema bilaterally up to ankles). Neurological: She is alert and oriented to person, place, and time. Skin: Skin is warm and dry. No rash noted. She is not diaphoretic. Psychiatric: Mood, memory, affect and judgment normal.   Nursing note and vitals reviewed. ASSESSMENT AND PLAN  Julianna Mahmood is a 80 y.o. female who presents today for:    1. Chronic hepatitis C without hepatic coma [facility])  Patient declines treatment for now but will obtain updated ultrasound to evaluate liver anatomy and quantify viral load. - HCV RT-PCR, QUANT (NON-GRAPH)  -  ABD LTD; Future    2. HTN, goal below 150/90  Stable, continue current treatment.  - BASIC METABOLIC PANEL (7)    3. Leg edema  Likely dependent edema. Will give short supply of as needed Lasix if compression stockings is not enough. If BNP elevated, consider updated TTE.  - BNP  - furosemide (LASIX) 20 mg tablet; Take 1 Tab by mouth daily. PRN leg edema  Dispense: 30 Tab; Refill: 0      There are no discontinued medications. Follow-up Disposition:  Return in about 6 months (around 11/3/2018) for HTN follow up. Medication risks/benefits/costs/interactions/alternatives discussed with patient. Advised patient to call back or return to office if symptoms worsen/change/persist. If patient cannot reach us or should anything more severe/urgent arise he/she should proceed directly to the nearest emergency department. Discussed expected course/resolution/complications of diagnosis in detail with patient. Patient given a written after visit summary which includes his/her diagnoses, current medications and vitals. Patient expressed understanding with the diagnosis and plan.      Enzo Austin M.D.

## 2018-05-04 LAB
BNP SERPL-MCNC: 113.6 PG/ML (ref 0–100)
BUN SERPL-MCNC: 49 MG/DL (ref 8–27)
BUN/CREAT SERPL: 30 (ref 12–28)
CHLORIDE SERPL-SCNC: 97 MMOL/L (ref 96–106)
CO2 SERPL-SCNC: 25 MMOL/L (ref 18–29)
CREAT SERPL-MCNC: 1.63 MG/DL (ref 0.57–1)
GFR SERPLBLD CREATININE-BSD FMLA CKD-EPI: 28 ML/MIN/1.73
GFR SERPLBLD CREATININE-BSD FMLA CKD-EPI: 32 ML/MIN/1.73
GLUCOSE SERPL-MCNC: 91 MG/DL (ref 65–99)
HCV RNA SERPL NAA+PROBE-ACNC: NORMAL IU/ML
HCV RNA SERPL NAA+PROBE-LOG IU: 6.09 LOG10 IU/ML
INTERPRETATION: NORMAL
POTASSIUM SERPL-SCNC: 4.9 MMOL/L (ref 3.5–5.2)
SODIUM SERPL-SCNC: 138 MMOL/L (ref 134–144)
TEST INFORMATION: NORMAL

## 2018-05-07 NOTE — PROGRESS NOTES
Please notify patient regarding their test results (likely will need to discuss with patient's daughter on HIPAA):    Hepatitis C viral load is very high; as previously discussed, would recommend seeing hepatology to discuss management options. Would also complete liver ultrasound as discussed during last visit. Kidney marker is also higher than it has been in the past.  Would use the Lasix for her leg edema very sparingly as this can damage the kidneys further with frequent use. Given the increasing creatinine level, would consider nephrology referral.  Heart marker called BNP is mildly elevated; given this finding and ongoing leg edema, would recommended updated echocardiogram (TTE) of the heart; please let me know if patient/patient's daughter amenable to order.

## 2018-05-09 ENCOUNTER — TELEPHONE (OUTPATIENT)
Dept: FAMILY MEDICINE CLINIC | Age: 83
End: 2018-05-09

## 2018-05-09 DIAGNOSIS — R79.89 ELEVATED BRAIN NATRIURETIC PEPTIDE (BNP) LEVEL: ICD-10-CM

## 2018-05-09 DIAGNOSIS — R60.0 LEG EDEMA: ICD-10-CM

## 2018-05-09 DIAGNOSIS — B18.2 CHRONIC HEPATITIS C WITHOUT HEPATIC COMA (HCC): Primary | ICD-10-CM

## 2018-05-09 NOTE — TELEPHONE ENCOUNTER
Patient daughter Omid Barreto (On HIPPA) is calling in regards to missed call from office.  She is requesting a call back     Best call back [phone] or [phone]

## 2018-05-10 NOTE — TELEPHONE ENCOUNTER
Call to patient's daughter on phi form. Patient's  verified. Informed daughter of all information regarding results and recommendations. She would like us to schedule hepatologist appointment and put in order for TTE. She would like to hold off on nephrology appointment for now. Gave her numbers to nephrology specialists and [facility] for her to schedule when available. Re:    Notes Recorded by Sera Flores MD on 2018 at 3:53 PM  Please notify patient regarding their test results (likely will need to discuss with patient's daughter on HIPAA):    Hepatitis C viral load is very high; as previously discussed, would recommend seeing hepatology to discuss management options.  Would also complete liver ultrasound as discussed during last visit. Kidney marker is also higher than it has been in the past.  Would use the Lasix for her leg edema very sparingly as this can damage the kidneys further with frequent use.  Given the increasing creatinine level, would consider nephrology referral.  Heart marker called BNP is mildly elevated; given this finding and ongoing leg edema, would recommended updated echocardiogram (TTE) of the heart; please let me know if patient/patient's daughter amenable to order.

## 2018-05-10 NOTE — PROGRESS NOTES
Call to patient's daughter on phi form. Patient's  verified. Informed daughter of all information regarding results and recommendations. She would like us to schedule hepatologist appointment and put in order for TTE. She would like to hold off on nephrology appointment for now. Gave her numbers to nephrology specialists and [facility] for her to schedule when available.

## 2018-05-11 NOTE — TELEPHONE ENCOUNTER
Gave daughter number to schedule 2D Echo. Informed her of appointment with Liver physician.     She understands

## 2018-05-14 ENCOUNTER — TELEPHONE (OUTPATIENT)
Dept: FAMILY MEDICINE CLINIC | Age: 83
End: 2018-05-14

## 2018-05-14 NOTE — TELEPHONE ENCOUNTER
Please call patient. [phone]. She is asking if you can mail her last lab results to address on file.

## 2018-05-31 ENCOUNTER — HOSPITAL ENCOUNTER (OUTPATIENT)
Dept: NON INVASIVE DIAGNOSTICS | Age: 83
Discharge: HOME | End: 2018-05-31
Attending: FAMILY MEDICINE
Payer: MEDICARE

## 2018-05-31 ENCOUNTER — HOSPITAL ENCOUNTER (OUTPATIENT)
Dept: ULTRASOUND IMAGING | Age: 83
Discharge: HOME | End: 2018-05-31
Attending: FAMILY MEDICINE
Payer: MEDICARE

## 2018-05-31 DIAGNOSIS — R79.89 ELEVATED BRAIN NATRIURETIC PEPTIDE (BNP) LEVEL: ICD-10-CM

## 2018-05-31 DIAGNOSIS — B18.2 CHRONIC HEPATITIS C WITHOUT HEPATIC COMA (HCC): ICD-10-CM

## 2018-05-31 DIAGNOSIS — R60.0 LEG EDEMA: ICD-10-CM

## 2018-05-31 PROCEDURE — 93306 TTE W/DOPPLER COMPLETE: CPT

## 2018-05-31 PROCEDURE — 76705 ECHO EXAM OF ABDOMEN: CPT

## 2018-05-31 NOTE — LETTER
6/6/2018 3:22 PM 
 
[facility] [id number] Dear Bhakti Fritz: 
 
Please find your most recent results below. Resulted Orders US ABD LTD Narrative INDICATION:  history of hepatitis C, untreated, elevated LFTs EXAM: US Abdomen Limited. FINDINGS: Abdomen sonogram of the right upper quadrant is performed. The 
gallbladder appears normal, with no stones, wall thickening or associated 
tenderness. The bile ducts are not enlarged. CBD measures 5 mm. Liver demonstrates normal echotexture with no sonographically apparent mass. Portal and hepatic veins are patent with antegrade flow. Main portal vein 
diameter is 0.9 cm with velocity 25 cm/s. Pancreatic head appears normal. Right kidney appears normal other than a cyst. 
There is no free fluid in the hepatorenal fossa. Impression IMPRESSION: Unremarkable right upper quadrant sonogram. 
  
 
 
RECOMMENDATIONS: 
Ultrasound of liver appears to be within normal limits.  Please keep upcoming hepatology/liver doctor appointment given history of hepatitis C. Ultrasound of heart appears overall to be reassuring.  Normal ejection fraction with mild to moderate mitral and tricuspid regurgitation.  No signs of overt heart failure.  Okay for as needed Lasix use for leg swelling Please call me if you have any questions: [phone] Sincerely, 
 
 
Dr. Mary Raines

## 2018-05-31 NOTE — LETTER
6/6/2018 3:25 PM 
 
Ms. Sagrario Cooley [facility] [id number] Dear Sagrario Navarro: 
 
Please find your most recent results below. Resulted Orders US ABD LTD Narrative INDICATION:  history of hepatitis C, untreated, elevated LFTs EXAM: US Abdomen Limited. FINDINGS: Abdomen sonogram of the right upper quadrant is performed. The 
gallbladder appears normal, with no stones, wall thickening or associated 
tenderness. The bile ducts are not enlarged. CBD measures 5 mm. Liver demonstrates normal echotexture with no sonographically apparent mass. Portal and hepatic veins are patent with antegrade flow. Main portal vein 
diameter is 0.9 cm with velocity 25 cm/s. Pancreatic head appears normal. Right kidney appears normal other than a cyst. 
There is no free fluid in the hepatorenal fossa. Impression IMPRESSION: Unremarkable right upper quadrant sonogram. 
  
 
 
RECOMMENDATIONS: 
Ultrasound of liver appears to be within normal limits.  Please keep upcoming hepatology/liver doctor appointment given history of hepatitis C. Ultrasound of heart appears overall to be reassuring.  Normal ejection fraction with mild to moderate mitral and tricuspid regurgitation.  No signs of overt heart failure.  Okay for as needed Lasix use for leg swelling Please call me if you have any questions: [phone] Sincerely, 
 
 
Dr Rachna Tipton

## 2018-06-04 NOTE — PROGRESS NOTES
Please notify patient regarding their test results (would relay results to patient's daughter, who is a physician):    Ultrasound of liver appears to be within normal limits. Please keep upcoming hepatology/liver doctor appointment given history of hepatitis C. Ultrasound of heart appears overall to be reassuring. Normal ejection fraction with mild to moderate mitral and tricuspid regurgitation. No signs of overt heart failure. Okay for as needed Lasix use for leg swelling.

## 2018-06-06 NOTE — PROGRESS NOTES
Outbound call to pt' daughter Joey Mcgrath, spoke with her  Lillie Reid who is also on pt's HIPPA form. He requested that I give him results and fax them to his wife Alla Pierre to review. He states Alla Pierre is not home at the moment but he will give her message to also call back if any questions.

## 2018-07-19 ENCOUNTER — TELEPHONE (OUTPATIENT)
Dept: FAMILY MEDICINE CLINIC | Age: 83
End: 2018-07-19

## 2018-07-19 RX ORDER — LOSARTAN POTASSIUM 50 MG/1
50 TABLET ORAL 2 TIMES DAILY
Qty: 180 TAB | Refills: 0 | OUTPATIENT
Start: 2018-07-19 | End: 2018-10-26 | Stop reason: SDUPTHER

## 2018-07-19 NOTE — TELEPHONE ENCOUNTER
Daughter Vilma Boyer is calling in regards to the patients Valsartan, she is requesting an alternate for Valsartan with Losartan 50 mg twice a day. She would like a 90 day supply  Pharmacy on file verified   Last seen :   May 03, 2018    Best call back : [phone]

## 2018-07-20 NOTE — TELEPHONE ENCOUNTER
Called in losartan to local pharmacy.     Left message stating medication requested is sent to pharmacy

## 2018-07-24 ENCOUNTER — TELEPHONE (OUTPATIENT)
Dept: FAMILY MEDICINE CLINIC | Age: 83
End: 2018-07-24

## 2018-07-24 NOTE — TELEPHONE ENCOUNTER
Spoke with son on phi form. Patient's  verified. He informed me losartan was not at pharmacy. Informed I will call in medication to pharmacy he can pick it up today.  He understands

## 2018-07-24 NOTE — TELEPHONE ENCOUNTER
Please call patient. [phone]. She wants her Losartan to go to [facility], which I changed on file.

## 2018-07-30 RX ORDER — LOSARTAN POTASSIUM 50 MG/1
50 TABLET ORAL 2 TIMES DAILY
Qty: 180 TAB | Refills: 0 | Status: CANCELLED | OUTPATIENT
Start: 2018-07-30

## 2018-07-30 NOTE — TELEPHONE ENCOUNTER
Patient is calling regarding her medication. She has not received the medication yet  . Requested Prescriptions     Pending Prescriptions Disp Refills    losartan (COZAAR) 50 mg tablet 180 Tab 0     Sig: Take 1 Tab by mouth two (2) times a day.  DISCONTINUE VALSARTAN     Pharmacy on file verified [facility])  Last refill : 7/19/18

## 2018-09-14 DIAGNOSIS — R60.0 LEG EDEMA: ICD-10-CM

## 2018-09-14 RX ORDER — FUROSEMIDE 20 MG/1
20 TABLET ORAL DAILY
Qty: 30 TAB | Refills: 0 | Status: SHIPPED | OUTPATIENT
Start: 2018-09-14 | End: 2018-11-08 | Stop reason: SDUPTHER

## 2018-09-14 NOTE — TELEPHONE ENCOUNTER
Pts daughter Alice Leon is requesting a Rx for Furosemide. Daughters number is [phone] and Darleen Cavanaugh number is [phone]. Pharmacy on file verified  Last refill: 5/3/18  Requested Prescriptions     Pending Prescriptions Disp Refills    furosemide (LASIX) 20 mg tablet 30 Tab 0     Sig: Take 1 Tab by mouth daily.  PRN leg edema

## 2018-11-08 ENCOUNTER — OFFICE VISIT (OUTPATIENT)
Dept: FAMILY MEDICINE CLINIC | Age: 83
End: 2018-11-08

## 2018-11-08 ENCOUNTER — HOSPITAL ENCOUNTER (OUTPATIENT)
Dept: LAB | Age: 83
Discharge: HOME | End: 2018-11-08
Payer: MEDICARE

## 2018-11-08 VITALS
OXYGEN SATURATION: 98 % | TEMPERATURE: 97.8 F | RESPIRATION RATE: 18 BRPM | WEIGHT: 185.6 LBS | HEART RATE: 65 BPM | DIASTOLIC BLOOD PRESSURE: 62 MMHG | SYSTOLIC BLOOD PRESSURE: 126 MMHG | HEIGHT: 63 IN | BODY MASS INDEX: 32.89 KG/M2

## 2018-11-08 DIAGNOSIS — Z23 ENCOUNTER FOR IMMUNIZATION: ICD-10-CM

## 2018-11-08 DIAGNOSIS — B18.2 CHRONIC HEPATITIS C WITHOUT HEPATIC COMA (HCC): Primary | ICD-10-CM

## 2018-11-08 DIAGNOSIS — I10 HTN, GOAL BELOW 150/90: ICD-10-CM

## 2018-11-08 DIAGNOSIS — R60.0 LEG EDEMA: ICD-10-CM

## 2018-11-08 PROCEDURE — 85025 COMPLETE CBC W/AUTO DIFF WBC: CPT

## 2018-11-08 PROCEDURE — 80053 COMPREHEN METABOLIC PANEL: CPT

## 2018-11-08 PROCEDURE — 86708 HEPATITIS A ANTIBODY: CPT

## 2018-11-08 PROCEDURE — 36415 COLL VENOUS BLD VENIPUNCTURE: CPT

## 2018-11-08 RX ORDER — FUROSEMIDE 20 MG/1
20 TABLET ORAL
Qty: 90 TAB | Refills: 0 | Status: SHIPPED | OUTPATIENT
Start: 2018-11-08 | End: 2019-01-14 | Stop reason: SDUPTHER

## 2018-11-08 RX ORDER — METRONIDAZOLE 10 MG/G
GEL TOPICAL
Qty: 60 G | Refills: 2 | Status: CANCELLED | OUTPATIENT
Start: 2018-11-08

## 2018-11-08 NOTE — PATIENT INSTRUCTIONS
????? DASH (??????????????? ?????? ? ??????? ??????????): ??????????? ?????????? - [ DASH Diet: Care Instructions ] ?????????? ?? ??????? ????? 
????? DASH ???????????? ????? ???? ???????, ??????? ??????? ??????? ???????????? ????????. ?????????? DASH ???????? «??????????????? ?????? ? ??????? ??????????». ??????????  ??? ??????? ???????????? ????????. 
???????? ???? ????? DASH ??????????? ? ??????????? ?????????, ??????? ????????, ?????? ? ???????. ??? ???????? ????? ??????? ???????????? ????????. ??????????, ???????? ??????? ???????????? ??????????, ???????? ??????, ?????, ???????? ???????? ? ?????? ??????????? ????, ?????, ??????? ? ???????. ?????? ????? ??????? ??????? ? ????????, ?????? ? ??????? ?????? ???????????? ?????????, ??????? ??????? ??????????, ?? ???????? ?????? ?? ???????. ????? ????, ????? DASH ???????? ??????? ?????, ???? ? ?????. 
????? DASH ???????? ????? ?? ?????????? ??????????? ????????? ?????? ?????, ??????? ??? ???? ????? ??????????????? ??? ???????? ????????????? ????????. ????? ??? ????, ????????, ????????? ????????? ?????????? ?????? ? ???????????? ???????. ?????????? ?????????? ?????? ??? ?????????? ????? DASH ????? ??????? ???????????? ???????? ??? ???????, ??? ????? DASH ???? ?? ????. 
??????????? ?????????? ? ????  ?????? ????? ?????? ??????? ? ????? ?????? ????????.??????????? ????????? ?????, ???? ??? ??? ???????? ?????. ??? ????????? ??????? ????????? ?????? ?????. ????????????? ????? ?????? ????????, ??????? ?? ??????????, ? ????? ?? ????? ?????????? ????? ????????. 
??? ???????? ? ???????? ????????? 
?????????? ????? DASH 
· ?????????? ???? 45 ?????? ??????? ?????? ????. ??????? ???????? 1 ??????? ?????? ???????? ???????, ½ ????? ????? ?????????? ??? ???????????????? ???????, 1/4 ????? ??????????? ??? 100 ? (½ ?????) ?????????? ????. ???????????? ??????? ???????? ???????, ? ?? ????????? ?????. 
· ?????????? ???? 45 ?????? ?????? ?????? ????. ??????? ???????? 1 ????? ?????? ????? ??? ????? ???????? ??????, ½ ????? ????? ?????????? ??? ?????????? ???????????? ?????? ??? 100 ? (½ ?????) ???????? ????. ???????????? ??????? ???????? ??????, ? ?? ??????? ?????. 
· ????????? ????? 23 ?????? ???????? ????????? ? ?????? ??????????? ????? ??? ????????????. ??????? ???????? 200 ? ??????, 1 ????? ??????? ??? 40 ? ????. 
· ?????????? ???? 68 ?????? ???????? ?????? ????. ??????? ???????? 1 ????? ?????, 40 ? ????? ???????? ???? ??? ½ ????? ??????????????? ????, ??????? ??? ????. ?????????? ?? ??????????? ???????? ?????????????? ????????. 
· ?????????? ??????????? ???????? ????, ????? ? ???? ?? 2 ?????? ?????????. ?????? ?????????? 90 ?, ??? ?? ???????? ????????????? ?????? ????. 
· ?????? ?????? ???????? 45 ?????? ??????, ??????? ? ??????? (?????????????? ??????? ?????, ???????? ??? ???????? ??????). ??????? ???????? 1/3 ????? ??????, 2 ???????? ????? ??????? ??? ½ ????? ?????????????? ????? ??? ??????. 
· ????????????? ???? ? ????? ?? 23 ?????? ?????? ????. ?????? ?????????? 1 ?????? ????? ????????????? ????? ??? 2 ???????? ????? ???????? ????????. 
· ?????????? ??????? ? ?????????????? ????? ?? 5 ??? ????? ?????? ? ??????. ?????? ?????????? 1 ???????? ????? ???? ??? ?????, ½ ????? ??????? ??? 1 ????? ????????. 
· ???????????? ????? 2300 ?????????? (??) ?????? ? ????. ???? ?????????? ??????????? ?????? ?? 1500 ?? ? ????, ?? ?????? ???????? ???????????? ???????? ??? ???????. 
???????????? ???????????? ??? ?????????? ?????? 
· ??????? ? ??????. ?? ????????? ????? ?? ?????????? ???????? ???? ??????. ??? ????? ???????? ? ????, ??? ??? ????? ?????? ?? ??????? ??????? ????, ??? ??????? ??????????? ???????????? ?????. ??????? ????????? ????????? ? ??????????????? ??. ??? ?????? ??? ????????? ?????? ?????????, ???????? ??? ????????? ?????????. 
· ?????????? ??????? ????????? ?????????: 
? ???????? ????? ??????? ????? ??? ???? ? ?????? ??????? ???? ? ??? ?????????. ??? ???????? ????????? ???????????????? ?????????? ??????? ? ?????? ?????? ????. 
? ?????????? ???????????? ?????? ??? ????? ? ??????? ? ???????? ??????? ??? ????????? ???????. 
? ?????????? ? ?????????? ?????? ??????, ????????, ?????? ? ???. 
? ???????????? ??????? ?????? ??? ????? ? ???????? ????? ????????? ? ?????????? ???????. ?????????? ???????????? ????????, ?????, ??????, ????????, ???????, ??????? ??????? ? ???. 
? ? ???????? ?????, ??????????? ???????, ??????????? ????????????? ?????????? ????? ? ???????? ?????? ??????, ? ?? ????? ? ??????. 
? ????????? ?????? ???????? ????????????? ??? ????????? ???????. ??? ?????????? ????????? ??????? ???????? ????? ??? ??????? ? ?????????????? ??????. 
? ???????? ?????????????? ????? ? ?????? ? ???????. ?????????? ? ??????? ??? ??? ?????????? ??????. ???????? ??????? ? ???? ? ???? ?? ?????? ????? ??? ?????? ?????. 
??? ????? ???????? ?????????????? ??????????? 
????????? [email or web address] ????? X115 ? ?????? ?????? ?????????????? ?????????? \"????? DASH (??????????????? ?????? ? ??????? ??????????): ??????????? ?????????? - [ DASH Diet: Care Instructions ]. \" 
??????? ?????? ??: ??????? 5, 2017 
?????? ????????: 11.8 © [id number] [employer]. ??????????? ??????????, ?????????????? ? ???????????? ? ???????? ???????? ????? ??????. ???? ? ??? ????????? ??????? ? ?????-???? ??????????? ??? ?? ??????????, ?????????? ? ?????? ??????????, ??????? ?? ?????? ?????. ????????  [employer] ?? ???? ???????? ? ?? ????? ??????? ??????????????? ?? ??????????? ????????????? ?????? ??????????.

## 2018-11-08 NOTE — PROGRESS NOTES
Chief Complaint Patient presents with  Hypertension  
  follow up 1. Have you been to the ER, urgent care clinic since your last visit? Hospitalized since your last visit? No 
 
2. Have you seen or consulted any other health care providers outside of the [address] [place] [address] since your last visit? Include any pap smears or colon screening.  No 
Patient stated that she has decided to not go on with treatment with hepatologist.

## 2018-11-08 NOTE — PROGRESS NOTES
Patient Name: Dania Dodson MRN: [id number] SUBJECTIVE Dania Dodson is a 80 y.o. female who presents with the following: here with daughter. Patient was offered certified  services; patient refused and signed informed refusal form. Interview was conducted between provider and pt's daughter. Has a history of chronic hepatitis C. Patient does not want to see hepatology for further evaluation as she would not consider treatment at this time. Liver ultrasound done in May 2018 was negative for [address] daughter would like to continue this yearly instead of every 6 months. She is not immune to hepatitis B. Unsure what her hepatitis A immunity is. Would be amenable to receiving both of these vaccines but will look into her insurance coverage and where she can get it, either here in clinic or at the pharmacy. Takes Lasix about twice a week to control for leg edema. Review of Systems Constitutional: Negative for fever, malaise/fatigue and weight loss. Respiratory: Negative for cough, hemoptysis, shortness of breath and wheezing. Cardiovascular: Positive for leg swelling. Negative for chest pain, palpitations and PND. Gastrointestinal: Negative for abdominal pain, constipation, diarrhea, nausea and vomiting. The patient's medications, allergies, past medical history, surgical history, family history and social history were reviewed and updated where appropriate. Prior to Admission medications Medication Sig Start Date End Date Taking? Authorizing Provider  
losartan (COZAAR) 50 mg tablet TAKE ONE TABLET BY MOUTH TWICE A DAY *DISCONTINUE VALSARTAN* 10/29/18  Yes Morteza Oakley MD  
furosemide (LASIX) 20 mg tablet Take 1 Tab by mouth daily.  PRN leg edema 9/14/18  Yes Morteza Oakley MD  
metoprolol succinate (TOPROL-XL) 50 mg XL tablet TAKE 1 TABLET EVERY DAY 7/5/18  Yes Morteza Oakley MD  
 omeprazole (PRILOSEC) 20 mg capsule TAKE 1 CAPSULE EVERY DAY AS NEEDED FOR HEARTBURN 7/5/18  Yes Padmini Santos MD  
acetaminophen-codeine (TYLENOL #3) 300-30 mg per tablet TK 1 TO 2 TS PO Q 4 TO 6 H PRN FOR PAIN 2/3/18  Yes Provider, Historical  
metroNIDAZOLE (METROGEL) 1 % topical gel Apply thin film to affected area once daily 4/21/18  Yes Padmini Santos MD  
nitroglycerin (NITROSTAT) 0.4 mg SL tablet Take 1 tablet SL at first sign of attack; repeat every 5 minutes if needed for a total of 3 tablets in 15 minutes; if no relief, call 911 2/14/18  Yes Padmini Santos MD  
allopurinol (ZYLOPRIM) 100 mg tablet Take 2 Tabs by mouth daily. 2/12/18  Yes Padmini Santos MD  
hydroCHLOROthiazide (HYDRODIURIL) 25 mg tablet Take 0.5 Tabs by mouth daily. 2/12/18  Yes Padmini Santos MD  
diclofenac potassium (CATAFLAM) 50 mg tablet Take 50 mg by mouth two (2) times daily as needed. Yes Provider, Historical  
 
 
Allergies Allergen Reactions  Ace Inhibitors Cough  Pcn [Penicillins] Rash OBJECTIVE Visit Vitals /62 (BP 1 Location: Right arm, BP Patient Position: Sitting) Pulse 65 Temp 97.8 °F (36.6 °C) (Oral) Resp 18 Ht 5' 3\" (1.6 m) Wt 185 lb 9.6 oz (84.2 kg) SpO2 98% BMI 32.88 kg/m² Physical Exam  
Constitutional: She is oriented to person, place, and time and well-developed, well-nourished, and in no distress. No distress. Eyes: Conjunctivae and EOM are normal. Pupils are equal, round, and reactive to light. Cardiovascular: Normal rate, regular rhythm and normal heart sounds. Exam reveals no gallop and no friction rub. No murmur heard. Pulmonary/Chest: Effort normal and breath sounds normal. No respiratory distress. She has no wheezes. Musculoskeletal: She exhibits edema (1+ pitting edema in b/l ankles). Neurological: She is alert and oriented to person, place, and time. Skin: Skin is warm and dry. No rash noted. She is not diaphoretic. Psychiatric: Mood, memory, affect and judgment normal.  
Nursing note and vitals reviewed. ASSESSMENT AND PLAN Satinder Turner is a 80 y.o. female who presents today for: 1. Chronic hepatitis C without hepatic coma (HCC) Will evaluate hepatitis A immunity. Will need hepatitis B vaccine series. Daughter prefers a printed prescription for her to take to local pharmacy on any vaccines that are needed. - METABOLIC PANEL, COMPREHENSIVE 
- HEPATITIS A AB, IGM & TOTAL 
- CBC WITH AUTOMATED DIFF 2. Leg edema Stable, continue current treatment. - furosemide (LASIX) 20 mg tablet; Take 1 Tab by mouth daily as needed. PRN leg edema  Dispense: 90 Tab; Refill: 0 
 
3. HTN, goal below 150/90 Stable, continue current treatment. 4. Encounter for immunization Stable, continue current treatment. - INFLUENZA VACCINE INACTIVATED (IIV), SUBUNIT, ADJUVANTED, IM 
- ADMIN INFLUENZA VIRUS VAC Medications Discontinued During This Encounter Medication Reason  furosemide (LASIX) 20 mg tablet Reorder Follow-up Disposition: 
Return in about 6 months (around 5/8/2019) for Medicare Wellness Visit (30 min). Medication risks/benefits/costs/interactions/alternatives discussed with patient. Advised patient to call back or return to office if symptoms worsen/change/persist. If patient cannot reach us or should anything more severe/urgent arise he/she should proceed directly to the nearest emergency department. Discussed expected course/resolution/complications of diagnosis in detail with patient. Patient given a written after visit summary which includes his/her diagnoses, current medications and vitals. Patient expressed understanding with the diagnosis and plan. Cherri Christy M.D.

## 2018-11-09 ENCOUNTER — TELEPHONE (OUTPATIENT)
Dept: FAMILY MEDICINE CLINIC | Age: 83
End: 2018-11-09

## 2018-11-09 DIAGNOSIS — N18.9 CHRONIC KIDNEY DISEASE, UNSPECIFIED CKD STAGE: Primary | ICD-10-CM

## 2018-11-09 DIAGNOSIS — Z23 NEED FOR HEPATITIS B VACCINATION: ICD-10-CM

## 2018-11-09 LAB
ALBUMIN SERPL-MCNC: 4.1 G/DL (ref 3.5–4.7)
ALBUMIN/GLOB SERPL: 1.5 {RATIO} (ref 1.2–2.2)
ALP SERPL-CCNC: 58 IU/L (ref 39–117)
ALT SERPL-CCNC: 26 IU/L (ref 0–32)
AST SERPL-CCNC: 35 IU/L (ref 0–40)
BASOPHILS # BLD AUTO: 0 X10E3/UL (ref 0–0.2)
BASOPHILS NFR BLD AUTO: 0 %
BILIRUB SERPL-MCNC: 0.7 MG/DL (ref 0–1.2)
BUN SERPL-MCNC: 91 MG/DL (ref 8–27)
BUN/CREAT SERPL: 38 (ref 12–28)
CALCIUM SERPL-MCNC: 9.5 MG/DL (ref 8.7–10.3)
CHLORIDE SERPL-SCNC: 92 MMOL/L (ref 96–106)
CO2 SERPL-SCNC: 29 MMOL/L (ref 20–29)
CREAT SERPL-MCNC: 2.41 MG/DL (ref 0.57–1)
EOSINOPHIL # BLD AUTO: 0.1 X10E3/UL (ref 0–0.4)
EOSINOPHIL NFR BLD AUTO: 2 %
ERYTHROCYTE [DISTWIDTH] IN BLOOD BY AUTOMATED COUNT: 15.4 % (ref 12.3–15.4)
GLOBULIN SER CALC-MCNC: 2.7 G/DL (ref 1.5–4.5)
GLUCOSE SERPL-MCNC: 108 MG/DL (ref 65–99)
HAV AB SER QL IA: POSITIVE
HAV IGM SERPL QL IA: NEGATIVE
HCT VFR BLD AUTO: 32.2 % (ref 34–46.6)
HGB BLD-MCNC: 11.1 G/DL (ref 11.1–15.9)
IMM GRANULOCYTES # BLD: 0 X10E3/UL (ref 0–0.1)
IMM GRANULOCYTES NFR BLD: 0 %
INTERPRETATION: NORMAL
LYMPHOCYTES # BLD AUTO: 2.3 X10E3/UL (ref 0.7–3.1)
LYMPHOCYTES NFR BLD AUTO: 34 %
MCH RBC QN AUTO: 30.2 PG (ref 26.6–33)
MCHC RBC AUTO-ENTMCNC: 34.5 G/DL (ref 31.5–35.7)
MCV RBC AUTO: 88 FL (ref 79–97)
MONOCYTES # BLD AUTO: 0.5 X10E3/UL (ref 0.1–0.9)
MONOCYTES NFR BLD AUTO: 8 %
NEUTROPHILS # BLD AUTO: 3.8 X10E3/UL (ref 1.4–7)
NEUTROPHILS NFR BLD AUTO: 56 %
PLATELET # BLD AUTO: 210 X10E3/UL (ref 150–379)
POTASSIUM SERPL-SCNC: 4.2 MMOL/L (ref 3.5–5.2)
PROT SERPL-MCNC: 6.8 G/DL (ref 6–8.5)
RBC # BLD AUTO: 3.67 X10E6/UL (ref 3.77–5.28)
SODIUM SERPL-SCNC: 137 MMOL/L (ref 134–144)
WBC # BLD AUTO: 6.7 X10E3/UL (ref 3.4–10.8)

## 2018-11-09 NOTE — PROGRESS NOTES
Please notify patient regarding their test results: 
 
Kidney marker is much higher than before. Creatinine is 2.41, previously 1.63. Would recommend patient to stop hydrochlorothiazide and NSAIDs; may use as needed Lasix very sparingly. Monitor BPs at home and encourage hydration with water. Would encourage family to set up appointment with nephrology given worsening kidney function. I also would have patient come back next week after stopping hydrochlorothiazide to recheck her creatinine level. No appointment needed. She is immune to hepatitis A therefore only needs hepatitis B vaccine series. Printed out paper prescription for daughter to  to bring to local pharmacy. No anemia or low platelets.

## 2018-11-09 NOTE — TELEPHONE ENCOUNTER
Called Peggy Garcia regarding a prescription for Hep B series.  verified. Informed Peggy that prescription is ready for .

## 2018-11-16 NOTE — PROGRESS NOTES
Incoming call from [facility] who is on phi form. Patient's  verified. Informed of lab results and recommendations. Se understands and stated that she will hold off on follow up with nephrologist, she wants to recheck creatinine levels first. A copy of lab results will be mailed to patient's address.

## 2018-11-28 LAB
BUN SERPL-MCNC: 23 MG/DL (ref 8–27)
BUN/CREAT SERPL: 18 (ref 12–28)
CHLORIDE SERPL-SCNC: 95 MMOL/L (ref 96–106)
CO2 SERPL-SCNC: 23 MMOL/L (ref 20–29)
CREAT SERPL-MCNC: 1.26 MG/DL (ref 0.57–1)
GLUCOSE SERPL-MCNC: 90 MG/DL (ref 65–99)
INTERPRETATION: NORMAL
POTASSIUM SERPL-SCNC: 4.5 MMOL/L (ref 3.5–5.2)
SODIUM SERPL-SCNC: 133 MMOL/L (ref 134–144)

## 2018-11-29 NOTE — PROGRESS NOTES
Please notify patient regarding their test results:    Kidney marker improved since last visit since stopping hydrochlorothiazide and NSAIDs, now creatinine at 1.26. Patient to remain well-hydrated, avoid NSAIDs, and stopping hydrochlorothiazide.

## 2018-12-17 ENCOUNTER — OFFICE VISIT (OUTPATIENT)
Dept: FAMILY MEDICINE CLINIC | Age: 83
End: 2018-12-17

## 2018-12-17 VITALS
OXYGEN SATURATION: 97 % | DIASTOLIC BLOOD PRESSURE: 68 MMHG | TEMPERATURE: 97.6 F | RESPIRATION RATE: 18 BRPM | WEIGHT: 188.4 LBS | HEIGHT: 63 IN | BODY MASS INDEX: 33.38 KG/M2 | HEART RATE: 59 BPM | SYSTOLIC BLOOD PRESSURE: 137 MMHG

## 2018-12-17 DIAGNOSIS — J06.9 VIRAL UPPER RESPIRATORY TRACT INFECTION: ICD-10-CM

## 2018-12-17 DIAGNOSIS — R05.9 COUGH: Primary | ICD-10-CM

## 2018-12-17 DIAGNOSIS — J01.10 ACUTE FRONTAL SINUSITIS, RECURRENCE NOT SPECIFIED: ICD-10-CM

## 2018-12-17 RX ORDER — AZITHROMYCIN 250 MG/1
TABLET, FILM COATED ORAL
Qty: 6 TAB | Refills: 0 | Status: SHIPPED | OUTPATIENT
Start: 2018-12-21 | End: 2018-12-22

## 2018-12-17 RX ORDER — BENZONATATE 100 MG/1
100 CAPSULE ORAL
Qty: 30 CAP | Refills: 0 | Status: SHIPPED | OUTPATIENT
Start: 2018-12-17 | End: 2018-12-24

## 2018-12-17 RX ORDER — AZITHROMYCIN 250 MG/1
TABLET, FILM COATED ORAL
Qty: 6 TAB | Refills: 0 | Status: SHIPPED | OUTPATIENT
Start: 2018-12-17 | End: 2018-12-17 | Stop reason: SDUPTHER

## 2018-12-17 NOTE — PROGRESS NOTES
Chief Complaint   Patient presents with    Cough     x 3 days . Cough is non productive. Patient has not taken anything for this. 1. Have you been to the ER, urgent care clinic since your last visit? Hospitalized since your last visit? No    2. Have you seen or consulted any other health care providers outside of the [facility] since your last visit? Include any pap smears or colon screening.  No

## 2018-12-17 NOTE — PATIENT INSTRUCTIONS
For your symptoms: Your symptoms may improve with an oral antihistamine. These are available over the counter and include:  Loratadine/claritin  Cetirizine/Zyrtec  Fexofenadine/Allegra  Levocetirizine/Xyzal    · Your symptoms may improve with a nasal steroid. These are available over the counter and include:  · Flonase (aka fluticasone)  · Nasocort (aka triamcinolone)  · Nasonex (aka mometasone)  · Rhinocort (aka budesonide)    · Increase fluid intake, especially water to thin mucous and boost the immune system. · Avoid sugar and dairy while congested since they thicken mucous. · Get plenty of rest!    · Gargle 3 times daily and as needed in Listerine or warm salt water vinegar solutions (1 tsp salt, 1 tsp vinegar in 1 cup lukewarm water.)    · Use OTC nasal saline spray up each nostril four times daily. You could also consider using a netipot with distilled water. · Use humidifier at bedtime. · Use OTC Mucinex 600 mg twice daily to loosen mucous. · Use OTC Tylenol  (up to 650mg every 6 hours) or Ibuprofen (up to 800 mg every 8 hours) as needed for pain, fever or headaches. ·  Avoid decongestants and Ibuprofen if you have high blood pressure!       Return to the doctor for evaluation:  · If mucous is consistently discolored yellow or green throughout the day for more than a week  · If you develop worsening facial pain  · If you develop a fever that will not go away  · If your symptoms worsen instead of improve               ??????. ??????????? ?????????? - [ Cough: Care Instructions ]  ?????????? ?? ???????? ??????  ?????? -- ??????? ????????? ?? ???????????? ? ??????????? ?????. ?????????? ????? ?????? ?????. ?????? ?????????????? ????? ???????????, ??? ???, ??????? ?????, ??????? ? ???????????? ?????. ?????? ????? ????? ???????? ????????? ???????: ???????, ????????????? ????? (???????? ??????? ?? ??????? ???? ? ???????), ???????? ? ???????? ?????? ??????? ??????????? ???? ? ??????.  ?????? -- ??? ???????, ? ?? ???????????. ? ??????????? ??????? ?????? ???????? ??? ????????? ???????????, ???????? ???. ???? ????? ??????? ???? ??? ?????????? ?????, ??? ???????? ???????? ????????????.  ????????? ?????????? -- ?????? ????? ?????? ??????? ? ????? ?????? ????????. ??????????? ????????? ??? ??????????? ?????? ? ?????. ??? ????????? ??????? ????????? ?????? ?????. ????????????? ????? ?????? ??????????, ??????? ?? ??????????, ? ????? ?? ????? ?????????? ????? ????????.  ??? ????? ???????? ? ???????? ?????????  · ????? ??????? ?????????? ???? ? ?????? ?????????. ???????? ????? ????????? ???????, ????????? ??????? ? ???????? ? ?????. ??????? ???? ??? ??? ? ????? ??? ???????? ????? ????? ????????? ????? ??????.  · ?????????? ??????????????? ????????? ? ???????????? ? ?????????? ?????.  · ???? ? ???????, ???????????? ?????? ?? ????????, ????? ???????? ??????? ? ????????? ????? ??????.  · ?????????? ?????? ??????? ?? ????? ??? ?????????? ??????? ? ???????? ? ?????. ??????? ?? ????? ?? ?????????? ??? ??????. ??????? ?? ????? ?? ?????? ???????? ?? ????? ??????????? ????? ????????? ?? ?????? ?????? ??? ????? ??????? ?????????.  · ?? ??????. ?? ??????? ?????? ?????? ? ????? ???????????. ???? ??? ????????? ?????? ??? ????, ????? ??????? ??????, ???????? ?? ????? ?????? ????????? ? ????????? ??? ???, ??? ????? ??????? ??????. ??? ????? ?????? ??? ???????? ????? ???????? ??????? ??????.  ????? ????? ?????????? ?? ????????  ??????? ?? ?????? 911 ??????, ????? ?? ????????, ??? ??? ????? ????????????? ?????????? ??????????? ??????. ????????, ??????? ? ????????? ???????:  · ? ??? ?????? ?????????? ???????.  ?????????? ??????? ?????? ????? ??? ????????? ?????????? ??????????? ?????? ? ????????? ???????:  · ? ??? ??????????? ?????????????.  · ????????? ??? ???????? ??????????? ???????.  · ? ??? ????????? ???????????, ??? ??? ????? ????.  · ????????? ????.  ??????????? ?????????? ?? ??????????? ?????? ???????? ? ??????????? ??????????? ? ?????? ????? ? ????????? ???????:  · ????????? ????????? ????? ??? ???????? ????????? ?????, ???????? ??? ?????????? ?????????? ??????? ??? ????????? ????? ???????.  · ????????? ????? ?????????, ????? ??? ???? ? ?????, ? ??? ? ? ??????? ??????????? ????? ????.  · ??????? ????????? ?? ?? ?????????? ???? ?????.  ??? ????? ???????? ?????????????? ???????????  ????????? [email or web address] ????? D279 ? ?????? ?????? ?????????????? ?????????? \"??????. ??????????? ?????????? - [ Cough: Care Instructions ]. \"  ??????? ?????? ??: ??????? 6, 2017  ?????? ????????: 11.8  © [id number] [employer]. ??????????? ??????????, ?????????????? ? ???????????? ? ???????? ???????? ????? ??????. ???? ? ??? ????????? ??????? ? ?????-???? ??????????? ??? ?? ??????????, ?????????? ? ?????? ??????????, ??????? ?? ?????? ?????. ????????  [employer]wise, Incorporated ?? ???? ???????? ? ?? ????? ??????? ??????????????? ?? ??????????? ????????????? ?????? ??????????.         ?????????? ???? ????????? ????: ??????????? ?????????? - [ Saline Nasal Washes: Care Instructions ]  ?????????? ?? ??????? ?????  ?????????? ???? ????????? ???? ???????? ??????? ??????? ??????? ?????????? ?? ???? ????????? ?????? ??? ???????? ?????. ??? ??????? ???????? ????? ?????? ????????? ???????? ????????, ???????? ? ????????. ??? ????? ????? ???????? ???????, ???????? ????????????? ????????? ???????? ????. ??? ????????????? ???????? ? ?????? ????????? ??? ???????? ????????? ?????? ? ????, ?? ??? ?????? ???????? ? ??????? ?????????? ????.  ??????????? ?????????? ? ???? -- ?????? ????? ?????? ??????? ? ????? ?????? ????????.??????????? ????????? ?????, ???? ??? ??? ???????? ?????. ??? ????????? ??????? ????????? ?????? ?????. ????????????? ????? ?????? ????????, ??????? ?? ??????????, ? ????? ?? ????? ?????????? ????? ????????.  ??? ???????? ? ???????? ?????????  · ????? ?????? ??????? ??????????????? ??????? ? ??????-?????????????? ??? ?????? ???????? ??? ???????? ??????? ????? ? ??????. ?????????? ??? ??????????? ? ????????? ?????????? ? ?????????? ??.  · ????? ????, ????? ?????????? ??????? ???? ? ???????? ????????, ????????? 1 ?????? ????? ???? ? 1 ?????? ????? ??????? ???? ?? 2 ??????? ???????????????? ????.  · ??? ????????????? ????????, ?????????????? ? ???????? ????????, ??????? ??? ????????? ?????????? ? ?????? ??????. ???????? ????????? ?????, ??????? ?? ? ????????? ?????????? ? ??????? ????. ???????? ????????? ?????????? ??????? ???? ? ?????, ?????????? ?????.  · ??????, ???????? ?????? ??????? ?????. ?? ????????. ????????? ?????????? ????? ??? ??????-?????????????? ????????? ? ???? ?? ???????. ????????? ????????? ??? ????????? ????????? ?????? ? ??????. ????????? ??? ???????? ??? ?????? ??????. ??????? ????? ????????? ??????? ? ?????? ?? ?????; ??? ?????????.  · ????????? ?????????????.  · ???????? ?????? ?????? ??? ??????? ????? ??????? ?????????????.  · ?????????? ??? 2-3 ???? ? ????.  · ??? ?????? ??????? ????????????? ?????????? ??? ?????????.  ????? ????? ?????????? ?? ????????  ??????????? ?????????? ?? ??????????? ?????? ????????? ? ??????????? ??????????? ? ???????? ????? ? ????????? ???????.  · ? ??? ????? ?????? ???????????? ?? ????.  · ? ??? ???????? ? ??????????? ????.  ??? ????? ???????? ?????????????? ???????????  ????????? [email or web address] ????? [id number] ? ?????? ?????? ?????????????? ?????????? \"?????????? ???? ????????? ????: ??????????? ?????????? - [ Saline Nasal Washes: Care Instructions ]. \"  ??????? ?????? ??: ????? 28, 2018  ?????? ????????: 11.8  © [id number] [employer]wise, Inc[employer]. ??????????? ??????????, ?????????????? ? ???????????? ? ???????? ???????? ????? ??????. ???? ? ??? ????????? ??????? ? ?????-???? ??????????? ??? ?? ??????????, ?????????? ? ?????? ??????????, ??????? ?? ?????? ?????. ????????  [employer]wise, Incorporated ?? ???? ???????? ? ?? ????? ??????? ??????????????? ?? ??????????? ????????????? ?????? ??????????.         ???????? ??????? ??????????? ????? (????????). ??????????? ?????????? - [ Upper Respiratory Infection (Cold): Care Instructions ]  ?????????? ?? ???????? ??????    ???????? ??????? ??????????? ????? (????) -- ??? ???????? ????, ??????????? ????? ???? ? ??????. ???? ???????????????? ??? ?????, ??????? ? ???????????????? ????????. ??????? ???????? -- ??? ???????? ???????????????? ??? ????. ??????? ?????? ???? ???????? ????? ? ???????? ??????????? ????? ????.  ????? ??? ???? ?????????? ???????? (????? ???????? ???????? ???? - ?????? ????????????-???????? ?????????). ??? ???? ??????? ????????????? ????????????. ?????? ??????????? ???????? ????? ???????? ? ???????? ????????. ??? ????? ????? ???? ????? ???????? ? ????????? ??????????? ??? ??????? ?????????????? ????????. ?????? ?????, ?? ???????????? ???? ????? ????? 4-10 ????.  ???? ???? ? ???????? ??? ???????????, ???????? ????? ?????????? ???????. ??? ????????????? ?????-???? ???????? ??? ??? ????????? ????? ????????? ?????????? ?????????? ?? ??????????? ???????.  ??????????? ?????????? ? ???? -- ?????? ????? ?????? ??????? ? ????? ?????? ????????. ??????????? ????????? ??? ??????????? ?????? ? ?????. ??? ????????? ??????? ????????? ???????? ?????. ????????????? ????? ?????? ????????, ??????? ?? ??????????, ? ????? ?? ????? ?????????? ????????.  ??? ???????? ? ???????? ?????????  · ?? ????????? ????????????? ????????? ????? ????? ????????, ????? ???? ???? ???? ??????-?????? ??? ??????????, ??? ????. ???????????? ???? ? ?????? ?????????? ????????, ??????? ?? ???????? ???????, ?? ??? ???, ???? ?? ?? ???????????? ???? ?????. ???? ?? ????????? ???????????? ?????, ?????? ??? ??????, ??? ??????? ?????????? ??????????? ????????, ?? ????? ??????????? ??????????? ???????? ????????????? ? ??????? ??????.  · ?????????? ??????????? ??? ??????? ?????????????? ?????????, ???????? ???????????? (Tylenol), ????????? (Advil, Motrin) ??? ????????? (Aleve). ?????????? ??? ??????????? ? ????????? ?????????? ? ?????????? ??.  · ????? ??????? ??????????????? ?????????? ? ?????????? ??? ??????? ???????? ???????????? ? ???????????? ? ???. ??? ????????? ????? ???? ??????????? ??? ????? ???????? ???????? ??? ??? ???? ???????????.  · ????????? ???????????? Tylenol ? ?????? ?????????????? ????????? ??? ??????? ???????? ? ?????? ??????? ? ?????????????. ?????? ?? ???? ?????????? ??? ???????? ???????????? (???????????), ? Tylenol -- ??? ????????????. ?????????? ?????????? ?? ???? ??????????, ????? ?????????, ??? ?? ????? ????????? ??????????????? ????. ?????????? ????? ????????????? (Tylenol) ? ???? ????????? ????.  · ?? ?????? ????? ????????.  · ?? ?????? ? ?? ?????????? ?????? ?????? ????? ? ????. ???? ?? ????????? ??????? ?????? ? ??? ????????? ??????, ???????? ?????? ????? ? ?????????? ? ??????????, ??????? ??????? ?????????? ?? ???? ????????. ??? ????? ???????? ???? ????? ???????? ??????? ??????.  ????? ????? ?????????? ?? ????????  ??????? ?? ?????? 911 ??????, ????? ?? ????????, ??? ??? ????? ????????????? ?????????? ??????????? ??????. ????????, ??????? ? ????????? ???????:  · ? ??? ?????? ?????????? ???????.  ?????????? ????????? ???????? ????? ??? ?????????? ?? ?????????? ??????????? ??????? ? ????????? ???????.  · ? ??? ????????? ???????????? ????????? ????????????.  · ? ??? ????????? ??? ????????? ??????????? ???????.  · ? ??? ????????? ???????????, ??? ??? ????? ??? ????.  · ? ??? ????????? ????.  ??????????? ?????????? ?? ??????????? ?????? ????????? ? ??????????? ??????????? ? ???????? ????? ? ????????? ???????:  · ? ??? ????????? ????? ????????, ????? ??? ???? ? ?????, ? ??? ? ? ??????? ??????????? ????? ???? (???????).   · ? ??? ???????? ????? ???????? ?????? ??? ?????????? ???????? ?????, ???????? ? ????????? ? ??????????? ?????????? ??????? ??? ?????????? ????? ???????.  · ??????? ????????? ?? ?? ?????????? ???? ?????.  ??? ????? ???????? ?????????????? ???????????  ????????? [email or web address] ????? [id number] ? ?????? ?????? ?????????????? ?????????? \"???????? ??????? ??????????? ????? (????????). ??????????? ?????????? - [ Upper Respiratory Infection (Cold): Care Instructions ]. \"  ??????? ?????? ??: ??????? 6, 2017  ?????? ????????: 11.8  © [phone] [employer], Inc[employer]. ??????????? ??????????, ?????????????? ? ???????????? ? ???????? ???????? ????? ??????. ???? ? ??? ????????? ??????? ? ?????-???? ??????????? ??? ?? ??????????, ?????????? ? ?????? ??????????, ??????? ?? ?????? ?????. ????????  [employer]wise, Incorporated ?? ???? ???????? ? ?? ????? ??????? ??????????????? ?? ??????????? ????????????? ?????? ??????????.

## 2018-12-17 NOTE — PROGRESS NOTES
[facility] Note      Subjective:     Chief Complaint   Patient presents with    Cough     x 3 days . Cough is non productive. Patient has not taken anything for this. Sanket Xiong is a 80y.o. year old female who presents for evaluation of the following:    Cough: Onset 3 days ago  Dry, no phlegm  Associated nasal drainage  Endorses problem breathing when lying flat last night  - frontal headache, nasal dranage  Denies fever, vomiting, rash, sinus drainage      Review of Systems   Pertinent positives and negative per HPI. All other systems  reviewed are negative for a Comprehensive ROS (10+). Past Medical History:   Diagnosis Date    Chronic hepatitis C without hepatic coma ([facility] [address] 02/01/2018    From blood transfusion, no history of treatment.  Duodenal ulcer     Gout     HTN, goal below 150/90 2/1/2018    Osteoarthritis     both knees        Social History     Socioeconomic History    Marital status:      Spouse name: Not on file    Number of children: Not on file    Years of education: Not on file    Highest education level: Not on file   Social Needs    Financial resource strain: Not on file    Food insecurity - worry: Not on file    Food insecurity - inability: Not on file   [employer] needs - medical: Not on file   [employer] needs - non-medical: Not on file   Occupational History    Not on file   Tobacco Use    Smoking status: Never Smoker    Smokeless tobacco: Never Used   Substance and Sexual Activity    Alcohol use: No    Drug use: No    Sexual activity: No   Other Topics Concern    Not on file   Social History Narrative    Not on file       Current Outpatient Medications   Medication Sig    metoprolol succinate (TOPROL-XL) 50 mg XL tablet TAKE 1 TABLET EVERY DAY    furosemide (LASIX) 20 mg tablet Take 1 Tab by mouth daily as needed.  PRN leg edema    losartan (COZAAR) 50 mg tablet TAKE ONE TABLET BY MOUTH TWICE A DAY *DISCONTINUE VALSARTAN*    omeprazole (PRILOSEC) 20 mg capsule TAKE 1 CAPSULE EVERY DAY AS NEEDED FOR HEARTBURN    acetaminophen-codeine (TYLENOL #3) 300-30 mg per tablet TK 1 TO 2 TS PO Q 4 TO 6 H PRN FOR PAIN    metroNIDAZOLE (METROGEL) 1 % topical gel Apply thin film to affected area once daily    nitroglycerin (NITROSTAT) 0.4 mg SL tablet Take 1 tablet SL at first sign of attack; repeat every 5 minutes if needed for a total of 3 tablets in 15 minutes; if no relief, call 911    allopurinol (ZYLOPRIM) 100 mg tablet Take 2 Tabs by mouth daily.  hydroCHLOROthiazide (HYDRODIURIL) 25 mg tablet Take 0.5 Tabs by mouth daily.  diclofenac potassium (CATAFLAM) 50 mg tablet Take 50 mg by mouth two (2) times daily as needed.  Hepatitis B Virus Vaccine, PF, (ENGERIX-B, PF,) 20 mcg/mL syrg injection 1 mL/dose (adult formulation) for 3 total doses administered at 0, 1, and 6 months. No current facility-administered medications for this visit. Objective:     Vitals:    12/17/18 1602 12/17/18 1605   BP: 155/66 137/68   Pulse: (!) 59    Resp: 18    Temp: 97.6 °F (36.4 °C)    TempSrc: Oral    SpO2: 97%    Weight: 188 lb 6.4 oz (85.5 kg)    Height: 5' 3\" (1.6 m)        Physical Examination:  General: Alert, cooperative, no distress, appears stated age. Eyes: Conjunctivae clear. PERRL, EOMs intact. Ears: Normal external ear canals both ears. TM clear and mobile bilaterally   Nose: Nares normal. Septum midline. Mucosa normal. No drainage or sinus tenderness. Mouth/Throat: Lips, mucosa, and tongue normal.   Neck: Supple, symmetrical, trachea midline, no adenopathy. No thyroid enlargement/tenderness/nodules  Back: Symmetric, no curvature. Lungs: Clear to auscultation bilaterally. Normal inspiratory and expiratory ratio. Heart: Regular rate and rhythm, S1, S2 normal, no murmur, click, rub or gallop. Extremities: Extremities normal, atraumatic, no cyanosis or edema.   Pulses: 2+ and symmetric all extremities. Skin: Skin color, texture, turgor normal. No rashes or lesions on exposed skin. Lymph nodes: Cervical, supraclavicular nodes normal.  Neurologic: CNII-XII intact. Orders Only on 11/09/2018   Component Date Value Ref Range Status    Glucose 11/27/2018 90  65 - 99 mg/dL Final    BUN 11/27/2018 23  8 - 27 mg/dL Final    Creatinine 11/27/2018 1.26* 0.57 - 1.00 mg/dL Final    GFR est non-AA 11/27/2018 38* >59 mL/min/1.73 Final    GFR est AA 11/27/2018 44* >59 mL/min/1.73 Final    BUN/Creatinine ratio 11/27/2018 18  12 - 28 Final    Sodium 11/27/2018 133* 134 - 144 mmol/L Final    Potassium 11/27/2018 4.5  3.5 - 5.2 mmol/L Final    Chloride 11/27/2018 95* 96 - 106 mmol/L Final    CO2 11/27/2018 23  20 - 29 mmol/L Final    Interpretation 11/27/2018 Note   Final    Supplemental report is available. Office Visit on 11/08/2018   Component Date Value Ref Range Status    Glucose 11/08/2018 108* 65 - 99 mg/dL Final    BUN 11/08/2018 91* 8 - 27 mg/dL Final    Creatinine 11/08/2018 2.41* 0.57 - 1.00 mg/dL Final    GFR est non-AA 11/08/2018 17* >59 mL/min/1.73 Final    GFR est AA 11/08/2018 20* >59 mL/min/1.73 Final    BUN/Creatinine ratio 11/08/2018 38* 12 - 28 Final    Sodium 11/08/2018 137  134 - 144 mmol/L Final    Potassium 11/08/2018 4.2  3.5 - 5.2 mmol/L Final    Chloride 11/08/2018 92* 96 - 106 mmol/L Final    CO2 11/08/2018 29  20 - 29 mmol/L Final    Calcium 11/08/2018 9.5  8.7 - 10.3 mg/dL Final    Protein, total 11/08/2018 6.8  6.0 - 8.5 g/dL Final    Albumin 11/08/2018 4.1  3.5 - 4.7 g/dL Final    GLOBULIN, TOTAL 11/08/2018 2.7  1.5 - 4.5 g/dL Final    A-G Ratio 11/08/2018 1.5  1.2 - 2.2 Final    Bilirubin, total 11/08/2018 0.7  0.0 - 1.2 mg/dL Final    Alk.  phosphatase 11/08/2018 58  39 - 117 IU/L Final    AST (SGOT) 11/08/2018 35  0 - 40 IU/L Final    ALT (SGPT) 11/08/2018 26  0 - 32 IU/L Final    Hepatitis A Ab, IgM 11/08/2018 Negative  Negative Final    Hep A Ab, total 11/08/2018 Positive* Negative Final    WBC 11/08/2018 6.7  3.4 - 10.8 x10E3/uL Final    RBC 11/08/2018 3.67* 3.77 - 5.28 x10E6/uL Final    HGB 11/08/2018 11.1  11.1 - 15.9 g/dL Final    HCT 11/08/2018 32.2* 34.0 - 46.6 % Final    MCV 11/08/2018 88  79 - 97 fL Final    MCH 11/08/2018 30.2  26.6 - 33.0 pg Final    MCHC 11/08/2018 34.5  31.5 - 35.7 g/dL Final    RDW 11/08/2018 15.4  12.3 - 15.4 % Final    PLATELET 82/82/6284 054  150 - 379 x10E3/uL Final    NEUTROPHILS 11/08/2018 56  Not Estab. % Final    Lymphocytes 11/08/2018 34  Not Estab. % Final    MONOCYTES 11/08/2018 8  Not Estab. % Final    EOSINOPHILS 11/08/2018 2  Not Estab. % Final    BASOPHILS 11/08/2018 0  Not Estab. % Final    ABS. NEUTROPHILS 11/08/2018 3.8  1.4 - 7.0 x10E3/uL Final    Abs Lymphocytes 11/08/2018 2.3  0.7 - 3.1 x10E3/uL Final    ABS. MONOCYTES 11/08/2018 0.5  0.1 - 0.9 x10E3/uL Final    ABS. EOSINOPHILS 11/08/2018 0.1  0.0 - 0.4 x10E3/uL Final    ABS. BASOPHILS 11/08/2018 0.0  0.0 - 0.2 x10E3/uL Final    IMMATURE GRANULOCYTES 11/08/2018 0  Not Estab. % Final    ABS. IMM. GRANS. 11/08/2018 0.0  0.0 - 0.1 x10E3/uL Final    Interpretation 11/08/2018 Note   Final    Supplemental report is available. Assessment/ Plan:   Diagnoses and all orders for this visit:    1. Cough  -     benzonatate (TESSALON) 100 mg capsule; Take 1 Cap by mouth three (3) times daily as needed for Cough for up to 7 days. 2. Viral upper respiratory tract infection    3. Acute frontal sinusitis, recurrence not specified  -     azithromycin (ZITHROMAX) 250 mg tablet; Take 2 tablets today, then take 1 tablet daily      Mild URI vs sinusitis. No SIRS. Trial benzonatate for cough. Trial of otc meds for symptom relief discussed and listed in patient instructions- mucinex + antihistamine + sinus rinse + NSAID + humidifier prn. Delayed abx provided for prolonged symptoms.          Educated patient on red flag symptoms to warrant return to clinic or emergency room visit. I have discussed the diagnosis with the patient and the intended plan as seen in the above orders. The patient has been offered or received an after-visit summary and questions were answered concerning future plans. I have discussed medication side effects and warnings with the patient as well. Follow-up Disposition:  Return if symptoms worsen or fail to improve, for Follow Up.       Signed,    Cortney Swanson MD  12/17/2018

## 2019-01-14 DIAGNOSIS — R60.0 LEG EDEMA: ICD-10-CM

## 2019-01-15 RX ORDER — FUROSEMIDE 20 MG/1
TABLET ORAL
Qty: 90 TAB | Refills: 0 | Status: SHIPPED | OUTPATIENT
Start: 2019-01-15 | End: 2019-03-18 | Stop reason: SDUPTHER

## 2019-01-22 ENCOUNTER — OFFICE VISIT (OUTPATIENT)
Dept: FAMILY MEDICINE CLINIC | Age: 84
End: 2019-01-22

## 2019-01-22 VITALS
TEMPERATURE: 98.3 F | BODY MASS INDEX: 32.07 KG/M2 | OXYGEN SATURATION: 98 % | RESPIRATION RATE: 18 BRPM | HEART RATE: 65 BPM | WEIGHT: 181 LBS | SYSTOLIC BLOOD PRESSURE: 108 MMHG | HEIGHT: 63 IN | DIASTOLIC BLOOD PRESSURE: 60 MMHG

## 2019-01-22 DIAGNOSIS — R05.9 COUGH: Primary | ICD-10-CM

## 2019-01-22 RX ORDER — BENZONATATE 100 MG/1
100 CAPSULE ORAL
Qty: 30 CAP | Refills: 0 | Status: SHIPPED | OUTPATIENT
Start: 2019-01-22 | End: 2019-08-19

## 2019-01-22 NOTE — PROGRESS NOTES
Patient Name: Becki Bullock   MRN: [id number]    Amber Varma is a 80 y.o. female who presents with the following: here with daughter. Patient was seen on 12/17 for initial URI. She felt better after taking azithromycin and Tessalon Perles helped her cough. However, daughter reports that over the past week, patient has had a persistent episodic dry cough that is bothersome to her. Denies history of fevers, wheezing, shortness of breath, prior history of asthma. Daughter reports she does have a history of pneumonia many years ago. Up-to-date on her pneumonia vaccines and flu shot. Temperature change seems to be a trigger for her cough. Review of Systems   Constitutional: Negative for fever, malaise/fatigue and weight loss. Respiratory: Positive for cough. Negative for hemoptysis, shortness of breath and wheezing. Cardiovascular: Negative for chest pain, palpitations, leg swelling and PND. Gastrointestinal: Negative for abdominal pain, constipation, diarrhea, nausea and vomiting. The patient's medications, allergies, past medical history, surgical history, family history and social history were reviewed and updated where appropriate. Prior to Admission medications    Medication Sig Start Date End Date Taking?  Authorizing Provider   furosemide (LASIX) 20 mg tablet TAKE 1 TABLET EVERY DAY AS NEEDED FOR  LEG  EDEMA 1/15/19  Yes Marylou Sharma MD   metoprolol succinate (TOPROL-XL) 50 mg XL tablet TAKE 1 TABLET EVERY DAY 11/29/18  Yes Marylou Sharma MD   losartan (COZAAR) 50 mg tablet TAKE ONE TABLET BY MOUTH TWICE A DAY *DISCONTINUE VALSARTAN* 10/29/18  Yes Marylou Sharma MD   omeprazole (PRILOSEC) 20 mg capsule TAKE 1 CAPSULE EVERY DAY AS NEEDED FOR HEARTBURN 7/5/18  Yes Marylou Sharma MD   acetaminophen-codeine (TYLENOL #3) 300-30 mg per tablet TK 1 TO 2 TS PO Q 4 TO 6 H PRN FOR PAIN 2/3/18  Yes Provider, Historical   metroNIDAZOLE (METROGEL) 1 % topical gel Apply thin film to affected area once daily 4/21/18  Yes Russel Bird MD   nitroglycerin (NITROSTAT) 0.4 mg SL tablet Take 1 tablet SL at first sign of attack; repeat every 5 minutes if needed for a total of 3 tablets in 15 minutes; if no relief, call 911 2/14/18  Yes Russel Bird MD   allopurinol (ZYLOPRIM) 100 mg tablet Take 2 Tabs by mouth daily. 2/12/18  Yes Russel Bird MD   hydroCHLOROthiazide (HYDRODIURIL) 25 mg tablet Take 0.5 Tabs by mouth daily. 2/12/18  Yes Russel Bird MD   diclofenac potassium (CATAFLAM) 50 mg tablet Take 50 mg by mouth two (2) times daily as needed. Yes Provider, Historical       Allergies   Allergen Reactions    Ace Inhibitors Cough    Pcn [Penicillins] Rash           OBJECTIVE    Visit Vitals  /60 (BP 1 Location: Left arm, BP Patient Position: Sitting)   Pulse 65   Temp 98.3 °F (36.8 °C) (Oral)   Resp 18   Ht 5' 3\" (1.6 m)   Wt 181 lb (82.1 kg)   SpO2 98%   BMI 32.06 kg/m²       Physical Exam   Constitutional: She is oriented to person, place, and time and well-developed, well-nourished, and in no distress. No distress. HENT:   Head: Normocephalic and atraumatic. Right Ear: Tympanic membrane is not perforated and not erythematous. No middle ear effusion. No decreased hearing is noted. Left Ear: Tympanic membrane is not perforated and not erythematous. No middle ear effusion. No decreased hearing is noted. Nose: Nose normal. Right sinus exhibits no maxillary sinus tenderness and no frontal sinus tenderness. Left sinus exhibits no maxillary sinus tenderness and no frontal sinus tenderness. Mouth/Throat: Uvula is midline, oropharynx is clear and moist and mucous membranes are normal.   Neck: Normal range of motion. Neck supple. Cardiovascular: Normal rate, regular rhythm and normal heart sounds. Exam reveals no gallop and no friction rub. No murmur heard.   Pulmonary/Chest: Effort normal and breath sounds normal. No respiratory distress. She has no wheezes. Dry cough, with slight raspy quality   Lymphadenopathy:     She has no cervical adenopathy. Neurological: She is alert and oriented to person, place, and time. Skin: She is not diaphoretic. Psychiatric: Mood, memory, affect and judgment normal.   Nursing note and vitals reviewed. ASSESSMENT AND PLAN  Johnny Carrasquillo is a 80 y.o. female who presents today for:    1. Cough  Suspect post viral cough syndrome but will rule out pneumonia with chest x-ray. Continue with Marcos Shaffer states he may be helping her cough. Offered PRN albuterol as she seems to have some bronchospastic component to her cough but daughter declined. Reviewed signs and symptoms that would indicate a worsening medical condition which would require immediate evaluation and treatment; patient expressed understanding of plan. - XR CHEST PA LAT; Future  - benzonatate (TESSALON) 100 mg capsule; Take 1 Cap by mouth three (3) times daily as needed for Cough. Dispense: 30 Cap; Refill: 0       Medications Discontinued During This Encounter   Medication Reason    Hepatitis B Virus Vaccine, PF, (ENGERIX-B, PF,) 20 mcg/mL syrg injection        Follow-up Disposition:  Return if symptoms worsen or fail to improve. Medication risks/benefits/costs/interactions/alternatives discussed with patient. Advised patient to call back or return to office if symptoms worsen/change/persist. If patient cannot reach us or should anything more severe/urgent arise he/she should proceed directly to the nearest emergency department. Discussed expected course/resolution/complications of diagnosis in detail with patient. Patient given a written after visit summary which includes his/her diagnoses, current medications and vitals. Patient expressed understanding with the diagnosis and plan. Cherri Zamora M.D.

## 2019-01-22 NOTE — PROGRESS NOTES
Chief Complaint   Patient presents with    Cough     dry      1. Have you been to the ER, urgent care clinic since your last visit? Hospitalized since your last visit? No    2. Have you seen or consulted any other health care providers outside of the [address] [place] [address] since your last visit? Include any pap smears or colon screening.  No

## 2019-01-22 NOTE — PATIENT INSTRUCTIONS
???? ??. ??????????? ?????????? - [ Cough: Care Instructions ]  ?????????? ?? ???????? ??????  ?????? -- ??????? ????????? ?? ???????????? ? ??????????? ?????. ?????????? ????? ?????? ?????. ?????? ?????????????? ????? ???????????, ??? ???, ??????? ?????, ??????? ? ???????????? ?????. ?????? ????? ????? ???????? ????????? ???????: ???????, ????????????? ????? (???????? ??????? ?? ??????? ???? ? ???????), ???????? ? ???????? ?????? ??????? ??????????? ???? ? ??????.  ?????? -- ??? ???????, ? ?? ???????????. ? ??????????? ??????? ?????? ???????? ??? ????????? ???????????, ???????? ???. ???? ????? ??????? ???? ??? ?????????? ?????, ??? ???????? ???????? ????????????.  ????????? ?????????? -- ?????? ????? ?????? ??????? ? ????? ?????? ????????. ??????????? ????????? ??? ??????????? ?????? ? ?????. ??? ????????? ??????? ????????? ?????? ?????. ????????????? ????? ?????? ??????????, ??????? ?? ??????????, ? ????? ?? ????? ?????????? ????? ????????.  ??? ????? ???????? ? ???????? ?????????  · ????? ??????? ?????????? ???? ? ?????? ?????????. ???????? ????? ????????? ???????, ????????? ??????? ? ???????? ? ?????. ??????? ???? ??? ??? ? ????? ??? ???????? ????? ????? ????????? ????? ??????.  · ?????????? ??????????????? ????????? ? ???????????? ? ?????????? ?????.  · ???? ? ???????, ???????????? ?????? ?? ????????, ????? ???????? ??????? ? ????????? ????? ??????.  · ?????????? ?????? ??????? ?? ????? ??? ?????????? ??????? ? ???????? ? ?????. ??????? ?? ????? ?? ?????????? ??? ??????. ??????? ?? ????? ?? ?????? ???????? ?? ????? ??????????? ????? ????????? ?? ?????? ?????? ??? ????? ??????? ?????????.  · ?? ??????. ?? ??????? ?????? ?????? ? ????? ???????????. ???? ??? ????????? ?????? ??? ????, ????? ??????? ??????, ???????? ?? ????? ?????? ????????? ? ????????? ??? ???, ??? ????? ??????? ??????. ??? ????? ?????? ??? ???????? ????? ???????? ??????? ??????.  ????? ????? ?????????? ?? ????????  ??????? ?? ?????? [address]13 ??????, ????? ?? ????????, ??? ??? ????? ????????????? ?????????? ??????????? ??????. ????????, ??????? ? ????????? ???????:  · ? ??? ?????? ?????????? ???????.  ?????????? ??????? ?????? ????? ??? ????????? ?????????? ??????????? ?????? ? ????????? ???????:  · ? ??? ??????????? ?????????????.  · ????????? ??? ???????? ??????????? ???????.  · ? ??? ????????? ???????????, ??? ??? ????? ????.  · ????????? ????.  ??????????? ?????????? ?? ??????????? ?????? ???????? ? ??????????? ??????????? ? ?????? ????? ? ????????? ???????:  · ????????? ????????? ????? ??? ???????? ????????? ?????, ???????? ??? ?????????? ?????????? ??????? ??? ????????? ????? ???????.  · ????????? ????? ?????????, ????? ??? ???? ? ?????, ? ??? ? ? ??????? ??????????? ????? ????.  · ??????? ????????? ?? ?? ?????????? ???? ?????.  ??? ????? ???????? ?????????????? ???????????  ????????? [email or web address] ????? [id number] ? ?????? ?????? ?????????????? ?????????? \"??????. ??????????? ?????????? - [ Cough: Care Instructions ]. \"  ??????? ?????? ??: ???????? 5, 2018  ?????? ????????: 11.9  © 3555-7536 [employer]. ??????????? ??????????, ?????????????? ? ???????????? ? ???????? ???????? ????? ??????. ???? ? ??? ????????? ??????? ? ?????-???? ??????????? ??? ?? ??????????, ?????????? ? ?????? ??????????, ??????? ?? ?????? ?????. ????????  [employer] ?? ???? ???????? ? ?? ????? ??????? ??????????????? ?? ??????????? ????????????? ?????? ??????????.

## 2019-03-18 DIAGNOSIS — R60.0 LEG EDEMA: ICD-10-CM

## 2019-03-19 RX ORDER — FUROSEMIDE 20 MG/1
TABLET ORAL
Qty: 90 TAB | Refills: 0 | Status: SHIPPED | OUTPATIENT
Start: 2019-03-19 | End: 2019-08-19 | Stop reason: SDUPTHER

## 2019-05-13 RX ORDER — LOSARTAN POTASSIUM 50 MG/1
TABLET ORAL
Qty: 180 TAB | Refills: 1 | Status: SHIPPED | OUTPATIENT
Start: 2019-05-13 | End: 2019-08-19 | Stop reason: SDUPTHER

## 2019-05-13 NOTE — TELEPHONE ENCOUNTER
Patients Daughter is requesting a refill for 90 day supply  . Requested Prescriptions     Pending Prescriptions Disp Refills    losartan (COZAAR) 50 mg tablet 180 Tab 0    hydroCHLOROthiazide (HYDRODIURIL) 25 mg tablet 90 Tab 1     Sig: Take 0.5 Tabs by mouth daily.      Last refill : 10/29/2018  LOV : January 22, 2019  Pharmacy [facility] Verified

## 2019-05-14 RX ORDER — HYDROCHLOROTHIAZIDE 25 MG/1
12.5 TABLET ORAL DAILY
Qty: 45 TAB | Refills: 1 | Status: SHIPPED | OUTPATIENT
Start: 2019-05-14 | End: 2019-08-19 | Stop reason: SDUPTHER

## 2019-08-19 ENCOUNTER — OFFICE VISIT (OUTPATIENT)
Dept: FAMILY MEDICINE CLINIC | Age: 84
End: 2019-08-19

## 2019-08-19 ENCOUNTER — HOSPITAL ENCOUNTER (OUTPATIENT)
Dept: LAB | Age: 84
Discharge: HOME | End: 2019-08-19
Payer: MEDICARE

## 2019-08-19 VITALS
HEART RATE: 63 BPM | WEIGHT: 178.4 LBS | OXYGEN SATURATION: 97 % | HEIGHT: 63 IN | DIASTOLIC BLOOD PRESSURE: 68 MMHG | TEMPERATURE: 98.2 F | SYSTOLIC BLOOD PRESSURE: 120 MMHG | RESPIRATION RATE: 16 BRPM | BODY MASS INDEX: 31.61 KG/M2

## 2019-08-19 DIAGNOSIS — Z13.220 SCREENING, LIPID: ICD-10-CM

## 2019-08-19 DIAGNOSIS — L71.9 ROSACEA: ICD-10-CM

## 2019-08-19 DIAGNOSIS — M1A.9XX0 CHRONIC GOUT WITHOUT TOPHUS, UNSPECIFIED CAUSE, UNSPECIFIED SITE: ICD-10-CM

## 2019-08-19 DIAGNOSIS — I10 ESSENTIAL HYPERTENSION: ICD-10-CM

## 2019-08-19 DIAGNOSIS — Z23 ENCOUNTER FOR IMMUNIZATION: ICD-10-CM

## 2019-08-19 DIAGNOSIS — B18.2 CHRONIC HEPATITIS C WITHOUT HEPATIC COMA (HCC): ICD-10-CM

## 2019-08-19 DIAGNOSIS — R60.0 LEG EDEMA: ICD-10-CM

## 2019-08-19 DIAGNOSIS — Z00.00 MEDICARE ANNUAL WELLNESS VISIT, SUBSEQUENT: Primary | ICD-10-CM

## 2019-08-19 PROCEDURE — 80053 COMPREHEN METABOLIC PANEL: CPT

## 2019-08-19 PROCEDURE — 80061 LIPID PANEL: CPT

## 2019-08-19 PROCEDURE — 84550 ASSAY OF BLOOD/URIC ACID: CPT

## 2019-08-19 PROCEDURE — 36415 COLL VENOUS BLD VENIPUNCTURE: CPT

## 2019-08-19 RX ORDER — HYDROCHLOROTHIAZIDE 12.5 MG/1
12.5 TABLET ORAL DAILY
Qty: 90 TAB | Refills: 3 | Status: SHIPPED | OUTPATIENT
Start: 2019-08-19 | End: 2020-02-18 | Stop reason: SDUPTHER

## 2019-08-19 RX ORDER — METOPROLOL SUCCINATE 50 MG/1
TABLET, EXTENDED RELEASE ORAL
Qty: 90 TAB | Refills: 1 | Status: SHIPPED | OUTPATIENT
Start: 2019-08-19 | End: 2020-02-18 | Stop reason: SDUPTHER

## 2019-08-19 RX ORDER — ALLOPURINOL 100 MG/1
200 TABLET ORAL DAILY
Qty: 180 TAB | Refills: 1 | Status: SHIPPED | OUTPATIENT
Start: 2019-08-19 | End: 2020-02-18 | Stop reason: SDUPTHER

## 2019-08-19 RX ORDER — FUROSEMIDE 20 MG/1
20 TABLET ORAL DAILY
Qty: 90 TAB | Refills: 1 | Status: SHIPPED | OUTPATIENT
Start: 2019-08-19 | End: 2020-02-18 | Stop reason: SDUPTHER

## 2019-08-19 RX ORDER — METRONIDAZOLE 10 MG/G
GEL TOPICAL
Qty: 60 G | Refills: 2 | Status: SHIPPED | OUTPATIENT
Start: 2019-08-19 | End: 2020-02-18 | Stop reason: SDUPTHER

## 2019-08-19 RX ORDER — LOSARTAN POTASSIUM 50 MG/1
100 TABLET ORAL DAILY
Qty: 180 TAB | Refills: 1 | Status: SHIPPED | OUTPATIENT
Start: 2019-08-19 | End: 2020-02-18 | Stop reason: SDUPTHER

## 2019-08-19 NOTE — PROGRESS NOTES
Flu vaccine  administered 8/19/2019 by Delmer Fitzpatrick LPN with patients  consent. Patient tolerated procedure well. No reactions noted.

## 2019-08-19 NOTE — PROGRESS NOTES
Assessment/Plan   Education and counseling provided:  Are appropriate based on today's review and evaluation    Diagnoses and all orders for this visit:    1. Medicare annual wellness visit, subsequent    2. Leg edema  -     furosemide (LASIX) 20 mg tablet; Take 1 Tab by mouth daily.  -     CBC WITH AUTOMATED DIFF  -     METABOLIC PANEL, BASIC  Stable. Refilled today. Continue current therapy. 3. Chronic gout without tophus, unspecified cause, unspecified site  -     allopurinol (ZYLOPRIM) 100 mg tablet; Take 2 Tabs by mouth daily.  -     URIC ACID  Stable. Refilled today. Continue current therapy. 4. Rosacea  -     metroNIDAZOLE (METROGEL) 1 % topical gel; Apply thin film to affected area once daily  Stable. Refilled today. Continue current therapy. 5. Essential hypertension  -     hydroCHLOROthiazide (HYDRODIURIL) 12.5 mg tablet; Take 1 Tab by mouth daily. -     losartan (COZAAR) 50 mg tablet; Take 2 Tabs by mouth daily. -     metoprolol succinate (TOPROL-XL) 50 mg XL tablet; TAKE 1 TABLET EVERY DAY  -     METABOLIC PANEL, COMPREHENSIVE  Stable. Refilled today. Continue current therapy. 6. Chronic hepatitis C without hepatic coma ([facility] [address]  -     REFERRAL TO LIVER HEPATOLOGY  They are agreeable to evaluation. Given referral today. 7. Screening, lipid  -     LIPID PANEL    8. Encounter for immunization  -     MO IMMUNIZ ADMIN,1 SINGLE/COMB VAC/TOXOID  -     INFLUENZA VIRUS VACCINE, HIGH DOSE SEASONAL, PRESERVATIVE FREE    Other orders  -     CVD REPORT  -     CKD REPORT        Health Maintenance Due   Topic Date Due    GLAUCOMA SCREENING Q2Y  10/05/1994           SUBSEQUENT MEDICARE Sabina Elias  This is the Subsequent Medicare Annual Wellness Exam, performed 12 months or more after the Initial AWV or the last Subsequent AWV    I have reviewed the patient's medical history in detail and updated the computerized patient record. She is accompanied by her daughter who acts as . She is Irish-speaking. They have not followed up with Hepatology for treatment of Chronic Hepatitis C. She reports no gout attacks since her last evaluation. They reports lower extremity swelling is stable. She is walking occasionally. She lives with her daughter. They are working on dietary improvements. History     Past Medical History:   Diagnosis Date    Chronic hepatitis C without hepatic coma ([facility] [address] 02/01/2018    From blood transfusion, no history of treatment.  Duodenal ulcer     Gout     HTN, goal below 150/90 2/1/2018    Osteoarthritis     both knees      Past Surgical History:   Procedure Laterality Date    HX CATARACT REMOVAL Bilateral 2008    HX HYSTERECTOMY       Current Outpatient Medications   Medication Sig Dispense Refill    hydroCHLOROthiazide (HYDRODIURIL) 12.5 mg tablet Take 1 Tab by mouth daily. 90 Tab 3    losartan (COZAAR) 50 mg tablet Take 2 Tabs by mouth daily. 180 Tab 1    furosemide (LASIX) 20 mg tablet Take 1 Tab by mouth daily. 90 Tab 1    metoprolol succinate (TOPROL-XL) 50 mg XL tablet TAKE 1 TABLET EVERY DAY 90 Tab 1    metroNIDAZOLE (METROGEL) 1 % topical gel Apply thin film to affected area once daily 60 g 2    allopurinol (ZYLOPRIM) 100 mg tablet Take 2 Tabs by mouth daily. 180 Tab 1    omeprazole (PRILOSEC) 20 mg capsule TAKE 1 CAPSULE EVERY DAY AS NEEDED FOR HEARTBURN 90 Cap 1    nitroglycerin (NITROSTAT) 0.4 mg SL tablet Take 1 tablet SL at first sign of attack; repeat every 5 minutes if needed for a total of 3 tablets in 15 minutes; if no relief, call 911 30 Tab 0    diclofenac potassium (CATAFLAM) 50 mg tablet Take 50 mg by mouth two (2) times daily as needed.        Allergies   Allergen Reactions    Ace Inhibitors Cough    Pcn [Penicillins] Rash     Family History   Problem Relation Age of Onset    No Known Problems Mother     No Known Problems Father      Social History     Tobacco Use    Smoking status: Never Smoker    Smokeless tobacco: Never Used   Substance Use Topics    Alcohol use: No     Patient Active Problem List   Diagnosis Code    HTN, goal below 150/90 I10    Gout M10.9    Chronic hepatitis C without hepatic coma ([facility] [address] B18.2    Osteoarthritis M19.90       Depression Risk Factor Screening:     3 most recent PHQ Screens 8/19/2019   Little interest or pleasure in doing things Not at all   Feeling down, depressed, irritable, or hopeless Not at all   Total Score PHQ 2 0     Alcohol Risk Factor Screening: You do not drink alcohol or very rarely. Functional Ability and Level of Safety:   Hearing Loss  Hearing is good. Activities of Daily Living  The home contains: no safety equipment. Patient does total self care    Fall Risk  Fall Risk Assessment, last 12 mths 8/19/2019   Able to walk? Yes   Fall in past 12 months?  No   Fall with injury? -   Number of falls in past 12 months -   Fall Risk Score -       Abuse Screen  Patient is not abused    Cognitive Screening   Evaluation of Cognitive Function:  Has your family/caregiver stated any concerns about your memory: no  Normal    Patient Care Team   Patient Care Team:  Akila Wooten MD as PCP - General (Family Practice)  Shoshana Owusu MD as Physician (Internal Medicine)

## 2019-08-19 NOTE — PROGRESS NOTES
Chief Complaint   Patient presents with    Hypertension     follow up     1. Have you been to the ER, urgent care clinic since your last visit? Hospitalized since your last visit? No    2. Have you seen or consulted any other health care providers outside of the [address] [place] [address] since your last visit? Include any pap smears or colon screening.  No

## 2019-08-19 NOTE — PATIENT INSTRUCTIONS
DASH Diet: Care Instructions  Your Care Instructions    The DASH diet is an eating plan that can help lower your blood pressure. DASH stands for Dietary Approaches to Stop Hypertension. Hypertension is high blood pressure. The DASH diet focuses on eating foods that are high in calcium, potassium, and magnesium. These nutrients can lower blood pressure. The foods that are highest in these nutrients are fruits, vegetables, low-fat dairy products, nuts, seeds, and legumes. But taking calcium, potassium, and magnesium supplements instead of eating foods that are high in those nutrients does not have the same effect. The DASH diet also includes whole grains, fish, and poultry. The DASH diet is one of several lifestyle changes your doctor may recommend to lower your high blood pressure. Your doctor may also want you to decrease the amount of sodium in your diet. Lowering sodium while following the DASH diet can lower blood pressure even further than just the DASH diet alone. Follow-up care is a key part of your treatment and safety. Be sure to make and go to all appointments, and call your doctor if you are having problems. It's also a good idea to know your test results and keep a list of the medicines you take. How can you care for yourself at home? Following the DASH diet  · Eat 4 to 5 servings of fruit each day. A serving is 1 medium-sized piece of fruit, ½ cup chopped or canned fruit, 1/4 cup dried fruit, or 4 ounces (½ cup) of fruit juice. Choose fruit more often than fruit juice. · Eat 4 to 5 servings of vegetables each day. A serving is 1 cup of lettuce or raw leafy vegetables, ½ cup of chopped or cooked vegetables, or 4 ounces (½ cup) of vegetable juice. Choose vegetables more often than vegetable juice. · Get 2 to 3 servings of low-fat and fat-free dairy each day. A serving is 8 ounces of milk, 1 cup of yogurt, or 1 ½ ounces of cheese. · Eat 6 to 8 servings of grains each day.  A serving is 1 slice of bread, 1 ounce of dry cereal, or ½ cup of cooked rice, pasta, or cooked cereal. Try to choose whole-grain products as much as possible. · Limit lean meat, poultry, and fish to 2 servings each day. A serving is 3 ounces, about the size of a deck of cards. · Eat 4 to 5 servings of nuts, seeds, and legumes (cooked dried beans, lentils, and split peas) each week. A serving is 1/3 cup of nuts, 2 tablespoons of seeds, or ½ cup of cooked beans or peas. · Limit fats and oils to 2 to 3 servings each day. A serving is 1 teaspoon of vegetable oil or 2 tablespoons of salad dressing. · Limit sweets and added sugars to 5 servings or less a week. A serving is 1 tablespoon jelly or jam, ½ cup sorbet, or 1 cup of lemonade. · Eat less than 2,300 milligrams (mg) of sodium a day. If you limit your sodium to 1,500 mg a day, you can lower your blood pressure even more. Tips for success  · Start small. Do not try to make dramatic changes to your diet all at once. You might feel that you are missing out on your favorite foods and then be more likely to not follow the plan. Make small changes, and stick with them. Once those changes become habit, add a few more changes. · Try some of the following:  ? Make it a goal to eat a fruit or vegetable at every meal and at snacks. This will make it easy to get the recommended amount of fruits and vegetables each day. ? Try yogurt topped with fruit and nuts for a snack or healthy dessert. ? Add lettuce, tomato, cucumber, and onion to sandwiches. ? Combine a ready-made pizza crust with low-fat mozzarella cheese and lots of vegetable toppings. Try using tomatoes, squash, spinach, broccoli, carrots, cauliflower, and onions. ? Have a variety of cut-up vegetables with a low-fat dip as an appetizer instead of chips and dip. ? Sprinkle sunflower seeds or chopped almonds over salads. Or try adding chopped walnuts or almonds to cooked vegetables.   ? Try some vegetarian meals using beans and peas. Add garbanzo or kidney beans to salads. Make burritos and tacos with mashed thomas beans or black beans. Where can you learn more? Go to http://melissa-manohar.info/. Enter G674 in the search box to learn more about \"DASH Diet: Care Instructions. \"  Current as of: July 22, 2018  Content Version: 12.1  © 8114-9326 [employer], [employer]. Care instructions adapted under license by [employer] (which disclaims liability or warranty for this information). If you have questions about a medical condition or this instruction, always ask your healthcare professional. [address] any warranty or liability for your use of this information.

## 2019-08-20 LAB
ALBUMIN SERPL-MCNC: 4 G/DL (ref 3.5–4.7)
ALBUMIN/GLOB SERPL: 1.5 {RATIO} (ref 1.2–2.2)
ALP SERPL-CCNC: 58 IU/L (ref 39–117)
ALT SERPL-CCNC: 25 IU/L (ref 0–32)
AST SERPL-CCNC: 43 IU/L (ref 0–40)
BILIRUB SERPL-MCNC: 0.8 MG/DL (ref 0–1.2)
BUN SERPL-MCNC: 39 MG/DL (ref 8–27)
BUN/CREAT SERPL: 22 (ref 12–28)
CALCIUM SERPL-MCNC: 9.8 MG/DL (ref 8.7–10.3)
CHLORIDE SERPL-SCNC: 93 MMOL/L (ref 96–106)
CHOLEST SERPL-MCNC: 145 MG/DL (ref 100–199)
CO2 SERPL-SCNC: 26 MMOL/L (ref 20–29)
CREAT SERPL-MCNC: 1.77 MG/DL (ref 0.57–1)
GLOBULIN SER CALC-MCNC: 2.7 G/DL (ref 1.5–4.5)
GLUCOSE SERPL-MCNC: 95 MG/DL (ref 65–99)
HDLC SERPL-MCNC: 52 MG/DL
INTERPRETATION, 910389: NORMAL
INTERPRETATION: NORMAL
LDLC SERPL CALC-MCNC: 71 MG/DL (ref 0–99)
PDF IMAGE, 910387: NORMAL
POTASSIUM SERPL-SCNC: 4 MMOL/L (ref 3.5–5.2)
PROT SERPL-MCNC: 6.7 G/DL (ref 6–8.5)
SODIUM SERPL-SCNC: 135 MMOL/L (ref 134–144)
TRIGL SERPL-MCNC: 109 MG/DL (ref 0–149)
URATE SERPL-MCNC: 6.6 MG/DL (ref 2.5–7.1)
VLDLC SERPL CALC-MCNC: 22 MG/DL (ref 5–40)

## 2019-08-20 NOTE — PROGRESS NOTES
Let daughter know kidney function a bit worse than prior values. Drink plenty of water.   Rest of labs are at goal.

## 2019-08-21 ENCOUNTER — TELEPHONE (OUTPATIENT)
Dept: FAMILY MEDICINE CLINIC | Age: 84
End: 2019-08-21

## 2019-08-21 NOTE — PROGRESS NOTES
Called patient regarding lab results. Left a message for patient's daughter Ms. Garcia who is on phi to call back office.

## 2019-08-21 NOTE — TELEPHONE ENCOUNTER
..Peggy (on [facility]) is returning a call back to the office.       .Best callback:  [phone]

## 2019-08-21 NOTE — PROGRESS NOTES
Outbound call to patients daughter. Patients Date of birth verified. Patients daughter made aware of lab results and verbalized understanding. Patients daughter request to have repeat lab of kidney function in about one week and as well as lab CBC lab to check blood count.

## 2019-08-23 NOTE — ACP (ADVANCE CARE PLANNING)
Advance Care Planning (ACP) Provider Conversation Snapshot    Date of ACP Conversation: 08/19/19  Persons included in Conversation:  patient and family  Length of ACP Conversation in minutes:  <16 minutes (Non-Billable)    Authorized Decision Maker (if patient is incapable of making informed decisions): This person is:   Healthcare Agent/Medical Power of  under Advance Directive            For Patients with Decision Making Capacity:   Values/Goals: Exploration of values, goals, and preferences if recovery is not expected, even with continued medical treatment in the event of:  Imminent death    Conversation Outcomes / Follow-Up Plan:   Patient and family decline referral to NN.

## 2020-02-18 ENCOUNTER — OFFICE VISIT (OUTPATIENT)
Dept: FAMILY MEDICINE CLINIC | Age: 85
End: 2020-02-18

## 2020-02-18 VITALS
DIASTOLIC BLOOD PRESSURE: 66 MMHG | TEMPERATURE: 98.4 F | BODY MASS INDEX: 31.93 KG/M2 | OXYGEN SATURATION: 99 % | RESPIRATION RATE: 17 BRPM | HEART RATE: 60 BPM | HEIGHT: 63 IN | WEIGHT: 180.2 LBS | SYSTOLIC BLOOD PRESSURE: 138 MMHG

## 2020-02-18 DIAGNOSIS — L71.9 ROSACEA: ICD-10-CM

## 2020-02-18 DIAGNOSIS — R60.0 LEG EDEMA: ICD-10-CM

## 2020-02-18 DIAGNOSIS — B18.2 CHRONIC HEPATITIS C WITHOUT HEPATIC COMA (HCC): Primary | ICD-10-CM

## 2020-02-18 DIAGNOSIS — M1A.9XX0 CHRONIC GOUT WITHOUT TOPHUS, UNSPECIFIED CAUSE, UNSPECIFIED SITE: ICD-10-CM

## 2020-02-18 DIAGNOSIS — I10 ESSENTIAL HYPERTENSION: ICD-10-CM

## 2020-02-18 RX ORDER — FUROSEMIDE 20 MG/1
20 TABLET ORAL DAILY
Qty: 90 TAB | Refills: 3 | Status: SHIPPED | OUTPATIENT
Start: 2020-02-18

## 2020-02-18 RX ORDER — METOPROLOL SUCCINATE 50 MG/1
TABLET, EXTENDED RELEASE ORAL
Qty: 90 TAB | Refills: 3 | Status: SHIPPED | OUTPATIENT
Start: 2020-02-18 | End: 2021-07-17 | Stop reason: SDUPTHER

## 2020-02-18 RX ORDER — METRONIDAZOLE 10 MG/G
GEL TOPICAL
Qty: 60 G | Refills: 2 | Status: SHIPPED | OUTPATIENT
Start: 2020-02-18 | End: 2021-12-16 | Stop reason: SDUPTHER

## 2020-02-18 RX ORDER — DICLOFENAC POTASSIUM 50 MG/1
50 TABLET, FILM COATED ORAL
Qty: 60 TAB | Refills: 3 | Status: SHIPPED | OUTPATIENT
Start: 2020-02-18 | End: 2021-12-16

## 2020-02-18 RX ORDER — ALLOPURINOL 100 MG/1
200 TABLET ORAL DAILY
Qty: 180 TAB | Refills: 3 | Status: SHIPPED | OUTPATIENT
Start: 2020-02-18 | End: 2020-08-19 | Stop reason: SDUPTHER

## 2020-02-18 RX ORDER — NITROGLYCERIN 0.4 MG/1
TABLET SUBLINGUAL
Qty: 30 TAB | Refills: 0 | Status: SHIPPED | OUTPATIENT
Start: 2020-02-18

## 2020-02-18 RX ORDER — LOSARTAN POTASSIUM 50 MG/1
100 TABLET ORAL DAILY
Qty: 180 TAB | Refills: 3 | Status: SHIPPED | OUTPATIENT
Start: 2020-02-18 | End: 2022-08-25

## 2020-02-18 RX ORDER — HYDROCHLOROTHIAZIDE 12.5 MG/1
12.5 TABLET ORAL DAILY
Qty: 90 TAB | Refills: 3 | Status: SHIPPED | OUTPATIENT
Start: 2020-02-18 | End: 2021-12-16

## 2020-02-18 NOTE — PROGRESS NOTES
Chief Complaint   Patient presents with    Hypertension     6 month follow up    Medication Refill       1. Have you been to the ER, urgent care clinic since your last visit? Hospitalized since your last visit? No    2. Have you seen or consulted any other health care providers outside of the [address] [place] [address] since your last visit? Include any pap smears or colon screening.  No

## 2020-02-18 NOTE — PROGRESS NOTES
Assessment/Plan:     Diagnoses and all orders for this visit:    1. Chronic hepatitis C without hepatic coma (HCC)  -     AMMONIA  -     METABOLIC PANEL, COMPREHENSIVE  Declines evaluation or treatment. Labs pending. 2. Chronic gout without tophus, unspecified cause, unspecified site  -     allopurinoL (ZYLOPRIM) 100 mg tablet; Take 2 Tabs by mouth daily.  -     URIC ACID  Recent flare. If uric acid not to goal, consider increasing to 3 tablets daily. 3. Leg edema  -     furosemide (LASIX) 20 mg tablet; Take 1 Tab by mouth daily.  -     CBC WITH AUTOMATED DIFF  Stable. Refilled today. Continue current therapy. 4. Essential hypertension  -     hydroCHLOROthiazide (HYDRODIURIL) 12.5 mg tablet; Take 1 Tab by mouth daily. -     losartan (COZAAR) 50 mg tablet; Take 2 Tabs by mouth daily. -     metoprolol succinate (TOPROL-XL) 50 mg XL tablet; TAKE 1 TABLET EVERY DAY  -     CBC WITH AUTOMATED DIFF  Stable. Refilled today. Continue current therapy. 5. Rosacea  -     metroNIDAZOLE (METROGEL) 1 % topical gel; Apply thin film to affected area once daily  Stable. Refilled today. Continue current therapy. Other orders  -     diclofenac potassium (CATAFLAM) 50 mg tablet; Take 1 Tab by mouth two (2) times daily as needed for Pain.  -     nitroglycerin (NITROSTAT) 0.4 mg SL tablet; Take 1 tablet SL at first sign of attack; repeat every 5 minutes if needed for a total of 3 tablets in 15 minutes; if no relief, call 911  -     CKD REPORT  -     URIC ACID            Discussed expected course/resolution/complications of diagnosis in detail with patient. Medication risks/benefits/costs/interactions/alternatives discussed with patient. Pt was given after visit summary which includes diagnoses, current medications & vitals.    Pt expressed understanding with the diagnosis and plan          Subjective:      Ky Mccarthy is a 80 y.o. female who presents for had concerns including Hypertension (6 month follow up) and Medication Refill. She is followed by [facility]. Daughter who assists with translation today reports she has had one episode of gout about a month ago. She is otherwise tolerating allopurinol well. She does not follow a low purine diet. She has a history of chronic hepatitis C. She declines treatment and all related follow up. Patient Active Problem List   Diagnosis Code    HTN, goal below 150/90 I10    Gout M10.9    Chronic hepatitis C without hepatic coma ([facility] [place] [address] B18.2    Osteoarthritis M19.90       Current Outpatient Medications   Medication Sig Dispense Refill    allopurinoL (ZYLOPRIM) 100 mg tablet Take 2 Tabs by mouth daily. 180 Tab 3    diclofenac potassium (CATAFLAM) 50 mg tablet Take 1 Tab by mouth two (2) times daily as needed for Pain. 60 Tab 3    furosemide (LASIX) 20 mg tablet Take 1 Tab by mouth daily. 90 Tab 3    hydroCHLOROthiazide (HYDRODIURIL) 12.5 mg tablet Take 1 Tab by mouth daily. 90 Tab 3    losartan (COZAAR) 50 mg tablet Take 2 Tabs by mouth daily. 180 Tab 3    metoprolol succinate (TOPROL-XL) 50 mg XL tablet TAKE 1 TABLET EVERY DAY 90 Tab 3    metroNIDAZOLE (METROGEL) 1 % topical gel Apply thin film to affected area once daily 60 g 2    nitroglycerin (NITROSTAT) 0.4 mg SL tablet Take 1 tablet SL at first sign of attack; repeat every 5 minutes if needed for a total of 3 tablets in 15 minutes; if no relief, call 911 30 Tab 0    omeprazole (PRILOSEC) 20 mg capsule TAKE 1 CAPSULE EVERY DAY AS NEEDED FOR HEARTBURN 90 Cap 1       Allergies   Allergen Reactions    Ace Inhibitors Cough    Pcn [Penicillins] Rash       ROS:   Review of Systems   Constitutional: Negative for malaise/fatigue. Eyes: Negative for blurred vision. Respiratory: Negative for shortness of breath. Cardiovascular: Negative for chest pain.        Objective:     Visit Vitals  /66   Pulse 60   Temp 98.4 °F (36.9 °C) (Oral)   Resp 17   Ht 5' 3\" (1.6 m)   Wt 180 lb 3.2 oz (81.7 kg)   SpO2 99%   BMI 31.92 kg/m²       Vitals and Nurse Documentation reviewed. Physical Exam  Constitutional:       General: She is not in acute distress. Appearance: She is obese. HENT:      Right Ear: No middle ear effusion. Tympanic membrane is not erythematous or bulging. Left Ear:  No middle ear effusion. Tympanic membrane is not erythematous or bulging. Nose: No rhinorrhea. Right Sinus: No maxillary sinus tenderness or frontal sinus tenderness. Left Sinus: No maxillary sinus tenderness or frontal sinus tenderness. Mouth/Throat:      Pharynx: No oropharyngeal exudate or posterior oropharyngeal erythema. Eyes:      General: Lids are normal.   Cardiovascular:      Heart sounds: S1 normal and S2 normal. No murmur. No friction rub. No gallop. Pulmonary:      Breath sounds: Normal breath sounds. No wheezing. Lymphadenopathy:      Cervical: No cervical adenopathy. Skin:     General: Skin is warm and dry.    Psychiatric:         Mood and Affect: Mood and affect normal.

## 2020-02-19 LAB
ALBUMIN SERPL-MCNC: 4.2 G/DL (ref 3.5–4.6)
ALBUMIN/GLOB SERPL: 1.7 {RATIO} (ref 1.2–2.2)
ALP SERPL-CCNC: 70 IU/L (ref 39–117)
ALT SERPL-CCNC: 33 IU/L (ref 0–32)
AMMONIA PLAS-MCNC: 33 UG/DL (ref 28–135)
AST SERPL-CCNC: 54 IU/L (ref 0–40)
BASOPHILS # BLD AUTO: 0.1 X10E3/UL (ref 0–0.2)
BASOPHILS NFR BLD AUTO: 1 %
BILIRUB SERPL-MCNC: 0.7 MG/DL (ref 0–1.2)
BUN SERPL-MCNC: 51 MG/DL (ref 10–36)
BUN/CREAT SERPL: 30 (ref 12–28)
CALCIUM SERPL-MCNC: 10.3 MG/DL (ref 8.7–10.3)
CHLORIDE SERPL-SCNC: 100 MMOL/L (ref 96–106)
CO2 SERPL-SCNC: 25 MMOL/L (ref 20–29)
CREAT SERPL-MCNC: 1.68 MG/DL (ref 0.57–1)
EOSINOPHIL # BLD AUTO: 0.1 X10E3/UL (ref 0–0.4)
EOSINOPHIL NFR BLD AUTO: 2 %
ERYTHROCYTE [DISTWIDTH] IN BLOOD BY AUTOMATED COUNT: 14.4 % (ref 11.7–15.4)
GLOBULIN SER CALC-MCNC: 2.5 G/DL (ref 1.5–4.5)
GLUCOSE SERPL-MCNC: 97 MG/DL (ref 65–99)
HCT VFR BLD AUTO: 33.2 % (ref 34–46.6)
HGB BLD-MCNC: 11.7 G/DL (ref 11.1–15.9)
IMM GRANULOCYTES # BLD AUTO: 0 X10E3/UL (ref 0–0.1)
IMM GRANULOCYTES NFR BLD AUTO: 0 %
INTERPRETATION: NORMAL
LYMPHOCYTES # BLD AUTO: 2.1 X10E3/UL (ref 0.7–3.1)
LYMPHOCYTES NFR BLD AUTO: 30 %
MCH RBC QN AUTO: 31.3 PG (ref 26.6–33)
MCHC RBC AUTO-ENTMCNC: 35.2 G/DL (ref 31.5–35.7)
MCV RBC AUTO: 89 FL (ref 79–97)
MONOCYTES # BLD AUTO: 0.6 X10E3/UL (ref 0.1–0.9)
MONOCYTES NFR BLD AUTO: 9 %
NEUTROPHILS # BLD AUTO: 4 X10E3/UL (ref 1.4–7)
NEUTROPHILS NFR BLD AUTO: 58 %
PLATELET # BLD AUTO: 215 X10E3/UL (ref 150–450)
POTASSIUM SERPL-SCNC: 4.8 MMOL/L (ref 3.5–5.2)
PROT SERPL-MCNC: 6.7 G/DL (ref 6–8.5)
RBC # BLD AUTO: 3.74 X10E6/UL (ref 3.77–5.28)
SODIUM SERPL-SCNC: 142 MMOL/L (ref 134–144)
URATE SERPL-MCNC: 7.7 MG/DL (ref 2.5–7.1)
WBC # BLD AUTO: 6.9 X10E3/UL (ref 3.4–10.8)

## 2020-02-20 NOTE — PROGRESS NOTES
Uric acid is high. Increase Allopurinol to 3 tablets daily. Rest of labs are stable.  Ammonia is normal.

## 2020-08-12 ENCOUNTER — TELEPHONE (OUTPATIENT)
Dept: FAMILY MEDICINE CLINIC | Age: 85
End: 2020-08-12

## 2020-08-12 NOTE — TELEPHONE ENCOUNTER
[phone]- left message for pt regarding her upcoming appointment on 8/20/20 @3:15pm needing to reschedule this appointment to an earlier time due to the provider only being in the office until 2pm. Advised pt to call the office at [phone] to reschedule the time of the appointment

## 2020-08-18 ENCOUNTER — HOSPITAL ENCOUNTER (OUTPATIENT)
Dept: LAB | Age: 85
Discharge: HOME | End: 2020-08-18
Payer: MEDICARE

## 2020-08-18 ENCOUNTER — OFFICE VISIT (OUTPATIENT)
Dept: FAMILY MEDICINE CLINIC | Age: 85
End: 2020-08-18
Payer: MEDICARE

## 2020-08-18 VITALS
BODY MASS INDEX: 31.71 KG/M2 | DIASTOLIC BLOOD PRESSURE: 57 MMHG | OXYGEN SATURATION: 98 % | HEIGHT: 63 IN | RESPIRATION RATE: 16 BRPM | SYSTOLIC BLOOD PRESSURE: 127 MMHG | WEIGHT: 179 LBS | TEMPERATURE: 98.2 F | HEART RATE: 63 BPM

## 2020-08-18 DIAGNOSIS — M1A.9XX0 CHRONIC GOUT WITHOUT TOPHUS, UNSPECIFIED CAUSE, UNSPECIFIED SITE: ICD-10-CM

## 2020-08-18 DIAGNOSIS — B18.2 CHRONIC HEPATITIS C WITHOUT HEPATIC COMA (HCC): Primary | ICD-10-CM

## 2020-08-18 DIAGNOSIS — Z13.5 SCREENING FOR GLAUCOMA: ICD-10-CM

## 2020-08-18 DIAGNOSIS — M19.90 OSTEOARTHRITIS, UNSPECIFIED OSTEOARTHRITIS TYPE, UNSPECIFIED SITE: ICD-10-CM

## 2020-08-18 PROCEDURE — 1101F PT FALLS ASSESS-DOCD LE1/YR: CPT | Performed by: NURSE PRACTITIONER

## 2020-08-18 PROCEDURE — 1090F PRES/ABSN URINE INCON ASSESS: CPT | Performed by: NURSE PRACTITIONER

## 2020-08-18 PROCEDURE — G8419 CALC BMI OUT NRM PARAM NOF/U: HCPCS | Performed by: NURSE PRACTITIONER

## 2020-08-18 PROCEDURE — G8536 NO DOC ELDER MAL SCRN: HCPCS | Performed by: NURSE PRACTITIONER

## 2020-08-18 PROCEDURE — 80053 COMPREHEN METABOLIC PANEL: CPT

## 2020-08-18 PROCEDURE — G0463 HOSPITAL OUTPT CLINIC VISIT: HCPCS | Performed by: NURSE PRACTITIONER

## 2020-08-18 PROCEDURE — G8432 DEP SCR NOT DOC, RNG: HCPCS | Performed by: NURSE PRACTITIONER

## 2020-08-18 PROCEDURE — G8427 DOCREV CUR MEDS BY ELIG CLIN: HCPCS | Performed by: NURSE PRACTITIONER

## 2020-08-18 PROCEDURE — 84550 ASSAY OF BLOOD/URIC ACID: CPT

## 2020-08-18 PROCEDURE — 99213 OFFICE O/P EST LOW 20 MIN: CPT | Performed by: NURSE PRACTITIONER

## 2020-08-18 NOTE — PROGRESS NOTES
Assessment/Plan:     Diagnoses and all orders for this visit:    1. Chronic hepatitis C without hepatic coma (HCC)  Declines further evaluation. 2. Chronic gout without tophus, unspecified cause, unspecified site  -     URIC ACID    3. Osteoarthritis, unspecified osteoarthritis type, unspecified site  -     METABOLIC PANEL, COMPREHENSIVE    4. Screening for glaucoma  -     REFERRAL TO OPHTHALMOLOGY    Other orders  -     CKD REPORT        Follow-up and Dispositions    · Return in about 6 months (around 2/18/2021) for Medicare Wellness. Discussed expected course/resolution/complications of diagnosis in detail with patient. Medication risks/benefits/costs/interactions/alternatives discussed with patient. Pt was given after visit summary which includes diagnoses, current medications & vitals. Pt expressed understanding with the diagnosis and plan          Subjective:      Destiny Cruz is a 80 y.o. female who presents for had concerns including Follow-up; Hypertension; and Other (blood pressure cuff is too small- prescription for bigger cuff and monitor ). She is accompanied by her son today who assist in translation as the patient is non-English-speaking only. She is from [facility]. Hypertension  Patient is here for follow-up of hypertension. She indicates that she is feeling well and denies any symptoms referable to her hypertension, including chest pain, palpitations, dyspnea, orthopnea, or peripheral edema. Patient has these side effects of medication: none. She is not exercising and is not adherent to low salt diet. Blood pressure is well controlled at home. Use of agents associated with hypertension: none. History of renal disease: yes. Cardiovascular risk factors: obesity, hypertension, post-menopausal.      She declines referral to hepatology for a history of Chronic Hepatitis C. She declines all routine cancer screening. She declines DEXA screening.       Patient Active Problem List   Diagnosis Code    HTN, goal below 150/90 I10    Gout M10.9    Chronic hepatitis C without hepatic coma (HCC) B18.2    Osteoarthritis M19.90       Current Outpatient Medications   Medication Sig Dispense Refill    diclofenac potassium (CATAFLAM) 50 mg tablet Take 1 Tab by mouth two (2) times daily as needed for Pain. 60 Tab 3    furosemide (LASIX) 20 mg tablet Take 1 Tab by mouth daily. 90 Tab 3    hydroCHLOROthiazide (HYDRODIURIL) 12.5 mg tablet Take 1 Tab by mouth daily. 90 Tab 3    losartan (COZAAR) 50 mg tablet Take 2 Tabs by mouth daily. 180 Tab 3    metoprolol succinate (TOPROL-XL) 50 mg XL tablet TAKE 1 TABLET EVERY DAY 90 Tab 3    metroNIDAZOLE (METROGEL) 1 % topical gel Apply thin film to affected area once daily 60 g 2    nitroglycerin (NITROSTAT) 0.4 mg SL tablet Take 1 tablet SL at first sign of attack; repeat every 5 minutes if needed for a total of 3 tablets in 15 minutes; if no relief, call 911 30 Tab 0    omeprazole (PRILOSEC) 20 mg capsule TAKE 1 CAPSULE EVERY DAY AS NEEDED FOR HEARTBURN 90 Cap 1    allopurinoL (ZYLOPRIM) 100 mg tablet Take 3 Tabs by mouth daily. 270 Tab 3       Allergies   Allergen Reactions    Ace Inhibitors Cough    Pcn [Penicillins] Rash       ROS:   Review of Systems   Constitutional: Negative for malaise/fatigue. Eyes: Negative for blurred vision. Respiratory: Negative for shortness of breath. Cardiovascular: Negative for chest pain. Objective:     Visit Vitals  /57 (BP 1 Location: Left arm, BP Patient Position: Sitting)   Pulse 63   Temp 98.2 °F (36.8 °C) (Oral)   Resp 16   Ht 5' 3\" (1.6 m)   Wt 179 lb (81.2 kg)   SpO2 98%   BMI 31.71 kg/m²       Vitals and Nurse Documentation reviewed. Physical Exam  Constitutional:       General: She is not in acute distress. Cardiovascular:      Heart sounds: S1 normal and S2 normal. Murmur present. No friction rub. No gallop. Pulmonary:      Effort: No respiratory distress. Breath sounds: Normal breath sounds. Skin:     General: Skin is warm and dry.    Psychiatric:         Mood and Affect: Mood and affect normal.

## 2020-08-18 NOTE — PROGRESS NOTES
Chief Complaint   Patient presents with    Follow-up    Hypertension    Other     blood pressure cuff is too small- prescription for bigger cuff and monitor      1. Have you been to the ER, urgent care clinic since your last visit? Hospitalized since your last visit? No     2. Have you seen or consulted any other health care providers outside of the [address] [place] [address] since your last visit? Include any pap smears or colon screening.  No

## 2020-08-19 DIAGNOSIS — N18.4 CKD (CHRONIC KIDNEY DISEASE) STAGE 4, GFR 15-29 ML/MIN (HCC): Primary | ICD-10-CM

## 2020-08-19 DIAGNOSIS — M1A.9XX0 CHRONIC GOUT WITHOUT TOPHUS, UNSPECIFIED CAUSE, UNSPECIFIED SITE: ICD-10-CM

## 2020-08-19 LAB
ALBUMIN SERPL-MCNC: 3.9 G/DL (ref 3.5–4.6)
ALBUMIN/GLOB SERPL: 1.6 {RATIO} (ref 1.2–2.2)
ALP SERPL-CCNC: 62 IU/L (ref 39–117)
ALT SERPL-CCNC: 24 IU/L (ref 0–32)
AST SERPL-CCNC: 46 IU/L (ref 0–40)
BILIRUB SERPL-MCNC: 0.8 MG/DL (ref 0–1.2)
BUN SERPL-MCNC: 54 MG/DL (ref 10–36)
BUN/CREAT SERPL: 25 (ref 12–28)
CALCIUM SERPL-MCNC: 9.9 MG/DL (ref 8.7–10.3)
CHLORIDE SERPL-SCNC: 94 MMOL/L (ref 96–106)
CO2 SERPL-SCNC: 25 MMOL/L (ref 20–29)
CREAT SERPL-MCNC: 2.12 MG/DL (ref 0.57–1)
GLOBULIN SER CALC-MCNC: 2.5 G/DL (ref 1.5–4.5)
GLUCOSE SERPL-MCNC: 115 MG/DL (ref 65–99)
INTERPRETATION: NORMAL
POTASSIUM SERPL-SCNC: 4.1 MMOL/L (ref 3.5–5.2)
PROT SERPL-MCNC: 6.4 G/DL (ref 6–8.5)
SODIUM SERPL-SCNC: 135 MMOL/L (ref 134–144)
URATE SERPL-MCNC: 7.2 MG/DL (ref 2.5–7.1)

## 2020-08-19 RX ORDER — ALLOPURINOL 100 MG/1
300 TABLET ORAL DAILY
Qty: 270 TAB | Refills: 3 | Status: SHIPPED | OUTPATIENT
Start: 2020-08-19 | End: 2022-08-25

## 2020-08-19 NOTE — PROGRESS NOTES
Name and  verified with pts son in law and daughter, relayed information provided in the result notes.  I provided information for the nephrologist and also let them know I will mail the lab results as well. pts' daughter understands

## 2020-08-19 NOTE — PROGRESS NOTES
I would like her to establish with nephrology, Dr. Alexander Kidney as kidney function has worsened from previous values. Her gout level is still too elevated and I would like her to increase allopurinol to 3 tablets as discussed.

## 2020-09-01 NOTE — TELEPHONE ENCOUNTER
----- Message from Ale Hyman sent at 8/25/2020 11:21 AM EDT -----  Regarding: LIANE Sutton/ telephone  Contact: [phone]  Caller's first and last name: Estrada Norris (daughter)  Reason for call: Requesting a referral to a kidney specialist.   Bland Bernheim required yes/no and why: Yes  Best contact number(s): [phone] (daughter)  Details to clarify the request: LIANE Sutton referred pt. to a kidney specialist Dr. Kati Smalls, however, daughter has called multiple times and left message for the provider and has not heard back from anyone from the practice. Caller is requesting another referral for pt. see a  diferrent kidney doctor.

## 2020-09-03 NOTE — TELEPHONE ENCOUNTER
This is the usual group we refer to. They have multiple locations; she can try some of their other offices to see if they will respond.     [address] [facility]  [address]  [facility], [address]  Phone: [employer]an [employer] [phone] [address]  [facility], [address]  Phone: [phone]

## 2020-09-15 ENCOUNTER — TELEPHONE (OUTPATIENT)
Dept: FAMILY MEDICINE CLINIC | Age: 85
End: 2020-09-15

## 2020-09-15 NOTE — TELEPHONE ENCOUNTER
----- Message from Shaungregoryaleenaneva Luis Felipe sent at 9/15/2020  3:22 PM EDT -----  Regarding: Dr Jessica Junior / Joseph Ryees first and last name: Leah Santoyo (son-in-law)   Reason for call: Pt. has been waiting for 3 wks for test results. Would like test results to be mailed to physical address. Would also like immediate assistance and call back today.   Callback required yes/no and why: yes  Best contact number(s): [phone]  Details to clarify the request: n/a

## 2020-09-15 NOTE — TELEPHONE ENCOUNTER
Request lab results to be mailed to patients address. Show Additional Anesthesia Variables In The Stage Tabs: Yes

## 2020-09-25 ENCOUNTER — TELEPHONE (OUTPATIENT)
Dept: FAMILY MEDICINE CLINIC | Age: 85
End: 2020-09-25

## 2020-09-29 ENCOUNTER — TELEPHONE (OUTPATIENT)
Dept: FAMILY MEDICINE CLINIC | Age: 85
End: 2020-09-29

## 2020-09-29 NOTE — TELEPHONE ENCOUNTER
Outbound call to [address] [place] [address]. Spoke with Irvin Chavez. Notified that CME for blood pressure cuff form not received. [facility] verbalized understanding and will refax form.

## 2020-09-29 NOTE — TELEPHONE ENCOUNTER
----- Message from Tejon, LPN sent at 7/95/7531  3:27 PM EDT -----  Regarding: FW: Dr. Cody Ballard    ----- Message -----  From: Dequan Nielson  Sent: 9/24/2020  11:31 AM EDT  To: [facility] [place]  Subject: Dr. Esme Blount first and last name: Kalani Mccurdy with [facility]  Reason for call: Calling to verify that a certificate of medical necessity for a blood pressure monitor was received.   Callback required yes/no and why: yes  Best contact number(s): [phone]  Details to clarify the request: n/a

## 2020-10-30 ENCOUNTER — OFFICE VISIT (OUTPATIENT)
Dept: FAMILY MEDICINE CLINIC | Age: 85
End: 2020-10-30
Payer: MEDICARE

## 2020-10-30 VITALS
HEART RATE: 64 BPM | WEIGHT: 187 LBS | BODY MASS INDEX: 33.13 KG/M2 | SYSTOLIC BLOOD PRESSURE: 128 MMHG | RESPIRATION RATE: 16 BRPM | OXYGEN SATURATION: 97 % | DIASTOLIC BLOOD PRESSURE: 84 MMHG | HEIGHT: 63 IN | TEMPERATURE: 98 F

## 2020-10-30 DIAGNOSIS — R35.0 URINE FREQUENCY: Primary | ICD-10-CM

## 2020-10-30 LAB
BILIRUB UR QL STRIP: NEGATIVE
GLUCOSE UR-MCNC: NEGATIVE MG/DL
KETONES P FAST UR STRIP-MCNC: NEGATIVE MG/DL
PH UR STRIP: 5 [PH] (ref 4.6–8)
PROT UR QL STRIP: NEGATIVE
SP GR UR STRIP: 1 (ref 1–1.03)
UA UROBILINOGEN AMB POC: NORMAL (ref 0.2–1)
URINALYSIS CLARITY POC: CLEAR
URINALYSIS COLOR POC: YELLOW
URINE BLOOD POC: NEGATIVE
URINE LEUKOCYTES POC: NEGATIVE
URINE NITRITES POC: NEGATIVE

## 2020-10-30 PROCEDURE — 81003 URINALYSIS AUTO W/O SCOPE: CPT | Performed by: NURSE PRACTITIONER

## 2020-10-30 PROCEDURE — 99213 OFFICE O/P EST LOW 20 MIN: CPT | Performed by: NURSE PRACTITIONER

## 2020-10-30 PROCEDURE — 1090F PRES/ABSN URINE INCON ASSESS: CPT | Performed by: NURSE PRACTITIONER

## 2020-10-30 PROCEDURE — G8427 DOCREV CUR MEDS BY ELIG CLIN: HCPCS | Performed by: NURSE PRACTITIONER

## 2020-10-30 PROCEDURE — 1101F PT FALLS ASSESS-DOCD LE1/YR: CPT | Performed by: NURSE PRACTITIONER

## 2020-10-30 PROCEDURE — G8419 CALC BMI OUT NRM PARAM NOF/U: HCPCS | Performed by: NURSE PRACTITIONER

## 2020-10-30 PROCEDURE — G8432 DEP SCR NOT DOC, RNG: HCPCS | Performed by: NURSE PRACTITIONER

## 2020-10-30 PROCEDURE — G8536 NO DOC ELDER MAL SCRN: HCPCS | Performed by: NURSE PRACTITIONER

## 2020-10-30 PROCEDURE — G0463 HOSPITAL OUTPT CLINIC VISIT: HCPCS | Performed by: NURSE PRACTITIONER

## 2020-10-30 RX ORDER — CIPROFLOXACIN 250 MG/1
TABLET, FILM COATED ORAL
COMMUNITY
Start: 2020-10-22 | End: 2021-12-16

## 2020-10-30 NOTE — PROGRESS NOTES
[facility] Note  Subjective:      Tani Davis is a 80 y.o. female who presents for following up from urologist office for urinary tract infection. She finished a seven days course of antibiotics and needs to check her urine. Past Medical History:   Diagnosis Date    Chronic hepatitis C without hepatic coma ([facility] [address] 02/01/2018    From blood transfusion, no history of treatment.  Duodenal ulcer     Gout     HTN, goal below 150/90 2/1/2018    Osteoarthritis     both knees     Past Surgical History:   Procedure Laterality Date    HX CATARACT REMOVAL Bilateral 2008    HX HYSTERECTOMY         Current Outpatient Medications   Medication Sig Dispense Refill    allopurinoL (ZYLOPRIM) 100 mg tablet Take 3 Tabs by mouth daily. 270 Tab 3    diclofenac potassium (CATAFLAM) 50 mg tablet Take 1 Tab by mouth two (2) times daily as needed for Pain. 60 Tab 3    furosemide (LASIX) 20 mg tablet Take 1 Tab by mouth daily. 90 Tab 3    hydroCHLOROthiazide (HYDRODIURIL) 12.5 mg tablet Take 1 Tab by mouth daily. 90 Tab 3    losartan (COZAAR) 50 mg tablet Take 2 Tabs by mouth daily. 180 Tab 3    metoprolol succinate (TOPROL-XL) 50 mg XL tablet TAKE 1 TABLET EVERY DAY 90 Tab 3    metroNIDAZOLE (METROGEL) 1 % topical gel Apply thin film to affected area once daily 60 g 2    nitroglycerin (NITROSTAT) 0.4 mg SL tablet Take 1 tablet SL at first sign of attack; repeat every 5 minutes if needed for a total of 3 tablets in 15 minutes; if no relief, call 911 30 Tab 0    omeprazole (PRILOSEC) 20 mg capsule TAKE 1 CAPSULE EVERY DAY AS NEEDED FOR HEARTBURN 90 Cap 1    ciprofloxacin HCl (CIPRO) 250 mg tablet        Allergies   Allergen Reactions    Ace Inhibitors Cough    Pcn [Penicillins] Rash       ROS:   Complete review of systems was reviewed with pertinent information listed in HPI. Review of Systems   Constitutional: Negative. HENT: Negative. Respiratory: Negative. Cardiovascular: Negative. Gastrointestinal: Negative. Genitourinary: Negative. Objective:     Visit Vitals  /84 (BP 1 Location: Left arm, BP Patient Position: Sitting)   Pulse 64   Temp 98 °F (36.7 °C) (Temporal)   Resp 16   Ht 5' 3\" (1.6 m)   Wt 187 lb (84.8 kg)   SpO2 97%   BMI 33.13 kg/m²       Vitals and Nurse Documentation reviewed. Physical Exam  Constitutional:       Appearance: Normal appearance. She is normal weight. HENT:      Mouth/Throat:      Mouth: Mucous membranes are moist.   Neck:      Musculoskeletal: Normal range of motion and neck supple. Cardiovascular:      Rate and Rhythm: Normal rate and regular rhythm. Pulses: Normal pulses. Heart sounds: Normal heart sounds. No murmur. Pulmonary:      Effort: Pulmonary effort is normal.      Breath sounds: Normal breath sounds. Abdominal:      General: Bowel sounds are normal.      Palpations: Abdomen is soft. Genitourinary:     Comments: UA is clear for infection and blood         Assessment/Plan:     Diagnoses and all orders for this visit:    1.  Urine frequency  -     AMB POC URINALYSIS DIP STICK AUTO W/O MICRO          Pt expressed understanding with the diagnosis and plan        Discussed expected course/resolution/complications of diagnosis in detail with patient.    Medication risks/benefits/costs/interactions/alternatives discussed with patient.    Pt was given an after visit summary which includes diagnoses, current medications & vitals.  Pt expressed understanding with the diagnosis and plan

## 2021-07-17 DIAGNOSIS — I10 ESSENTIAL HYPERTENSION: ICD-10-CM

## 2021-07-17 RX ORDER — METOPROLOL SUCCINATE 50 MG/1
TABLET, EXTENDED RELEASE ORAL
Qty: 90 TABLET | Refills: 3 | Status: SHIPPED | OUTPATIENT
Start: 2021-07-17

## 2021-12-02 ENCOUNTER — TELEPHONE (OUTPATIENT)
Dept: FAMILY MEDICINE CLINIC | Age: 86
End: 2021-12-02

## 2021-12-02 NOTE — TELEPHONE ENCOUNTER
----- Message from Marcio Landers sent at 12/2/2021  1:38 PM EST -----  Subject: Hospital Follow Up    QUESTIONS  What hospital was the Patient Discharged from? [facility]   Date of Discharge? 2021-11-30  Discharge Location? Home  Reason for hospitalization as patient stated? Left Hip Fracture ##Pt is   requesting a virtual visit##   What question does the patient have, if applicable?   ---------------------------------------------------------------------------  --------------  CALL BACK INFO  What is the best way for the office to contact you? OK to leave message on   voicemail  Preferred Call Back Phone Number? [phone]  ---------------------------------------------------------------------------  --------------  SCRIPT ANSWERS  Relationship to Patient? Self  (Patient requests to see provider urgently. )? No  (Has the patient been discharged from the hospital within 2 business days   AND does not have a Telephone Encounter  Follow Up From [address] [place] [address]   documented in [facility]?)?  Yes

## 2021-12-03 NOTE — TELEPHONE ENCOUNTER
Ryland Snellen () [phone]     Pt daughter would like a call back and have an appointment scheduled for a Hospital Follow-up. Was upset that I did not have an appointment for her and requested to speak with the .

## 2021-12-09 ENCOUNTER — TELEPHONE (OUTPATIENT)
Dept: FAMILY MEDICINE CLINIC | Age: 86
End: 2021-12-09

## 2021-12-09 NOTE — TELEPHONE ENCOUNTER
Nadja Akbar and informed her of CHRIS Sutton's recommendations. Patient is unable to be transported and dispatch health does not provider the services needed. Informed Maria Victoria Murillo patient could be seen by a provider through [employer]. Maria Victoria Murillo verbalized understanding and will relay message to patient and daughter.

## 2021-12-09 NOTE — TELEPHONE ENCOUNTER
Yes, dispatch health or her surgeon.   This does not mention what surgery, but if it is surgical site she needs to contact her surgeon about it

## 2021-12-09 NOTE — TELEPHONE ENCOUNTER
Klaudia Veliz (OT) called and said Pt had surgery 2 wk ago and she has increased swelling with pain below lt knee. Pt's daughter Blessing Camargo is concerned, and requested callback on moving forward.      BCB# [phone]

## 2021-12-16 ENCOUNTER — VIRTUAL VISIT (OUTPATIENT)
Dept: FAMILY MEDICINE CLINIC | Age: 86
End: 2021-12-16
Payer: MEDICARE

## 2021-12-16 DIAGNOSIS — B18.2 CHRONIC HEPATITIS C WITHOUT HEPATIC COMA (HCC): ICD-10-CM

## 2021-12-16 DIAGNOSIS — N18.4 CHRONIC KIDNEY DISEASE, STAGE IV (SEVERE) (HCC): ICD-10-CM

## 2021-12-16 DIAGNOSIS — Z09 HOSPITAL DISCHARGE FOLLOW-UP: ICD-10-CM

## 2021-12-16 DIAGNOSIS — M1A.9XX0 CHRONIC GOUT WITHOUT TOPHUS, UNSPECIFIED CAUSE, UNSPECIFIED SITE: ICD-10-CM

## 2021-12-16 DIAGNOSIS — R73.03 PREDIABETES: ICD-10-CM

## 2021-12-16 DIAGNOSIS — Z78.0 POST-MENOPAUSAL: ICD-10-CM

## 2021-12-16 DIAGNOSIS — L71.9 ROSACEA: ICD-10-CM

## 2021-12-16 DIAGNOSIS — K59.00 CONSTIPATION, UNSPECIFIED CONSTIPATION TYPE: ICD-10-CM

## 2021-12-16 DIAGNOSIS — D50.9 IRON DEFICIENCY ANEMIA, UNSPECIFIED IRON DEFICIENCY ANEMIA TYPE: ICD-10-CM

## 2021-12-16 DIAGNOSIS — I10 ESSENTIAL HYPERTENSION: Primary | ICD-10-CM

## 2021-12-16 DIAGNOSIS — Z13.6 SCREENING FOR CARDIOVASCULAR CONDITION: ICD-10-CM

## 2021-12-16 DIAGNOSIS — I10 HTN, GOAL BELOW 150/90: ICD-10-CM

## 2021-12-16 PROCEDURE — 1111F DSCHRG MED/CURRENT MED MERGE: CPT | Performed by: FAMILY MEDICINE

## 2021-12-16 PROCEDURE — 1101F PT FALLS ASSESS-DOCD LE1/YR: CPT | Performed by: FAMILY MEDICINE

## 2021-12-16 PROCEDURE — G8432 DEP SCR NOT DOC, RNG: HCPCS | Performed by: FAMILY MEDICINE

## 2021-12-16 PROCEDURE — G8421 BMI NOT CALCULATED: HCPCS | Performed by: FAMILY MEDICINE

## 2021-12-16 PROCEDURE — 1090F PRES/ABSN URINE INCON ASSESS: CPT | Performed by: FAMILY MEDICINE

## 2021-12-16 PROCEDURE — G0463 HOSPITAL OUTPT CLINIC VISIT: HCPCS | Performed by: FAMILY MEDICINE

## 2021-12-16 PROCEDURE — G8428 CUR MEDS NOT DOCUMENT: HCPCS | Performed by: FAMILY MEDICINE

## 2021-12-16 PROCEDURE — G8536 NO DOC ELDER MAL SCRN: HCPCS | Performed by: FAMILY MEDICINE

## 2021-12-16 PROCEDURE — 99214 OFFICE O/P EST MOD 30 MIN: CPT | Performed by: FAMILY MEDICINE

## 2021-12-16 RX ORDER — FAMOTIDINE 20 MG/1
20 TABLET, FILM COATED ORAL DAILY
COMMUNITY
End: 2022-01-12

## 2021-12-16 RX ORDER — ENOXAPARIN SODIUM 100 MG/ML
INJECTION SUBCUTANEOUS
COMMUNITY
Start: 2021-11-30 | End: 2022-01-12

## 2021-12-16 RX ORDER — FERROUS SULFATE TAB 325 MG (65 MG ELEMENTAL FE) 325 (65 FE) MG
325 TAB ORAL DAILY
Qty: 90 TABLET | Refills: 1 | Status: SHIPPED | OUTPATIENT
Start: 2021-12-16

## 2021-12-16 RX ORDER — METRONIDAZOLE 10 MG/G
GEL TOPICAL
Qty: 60 G | Refills: 2 | Status: SHIPPED | OUTPATIENT
Start: 2021-12-16 | End: 2022-08-25 | Stop reason: SDUPTHER

## 2021-12-16 RX ORDER — AMOXICILLIN 250 MG
1 CAPSULE ORAL DAILY
COMMUNITY
End: 2021-12-16 | Stop reason: SDUPTHER

## 2021-12-16 RX ORDER — FERROUS SULFATE TAB 325 MG (65 MG ELEMENTAL FE) 325 (65 FE) MG
325 TAB ORAL DAILY
COMMUNITY
Start: 2021-11-30 | End: 2021-12-16 | Stop reason: SDUPTHER

## 2021-12-16 RX ORDER — ACETAMINOPHEN 500 MG
500 TABLET ORAL
COMMUNITY

## 2021-12-16 RX ORDER — AMOXICILLIN 250 MG
1 CAPSULE ORAL DAILY
Qty: 90 TABLET | Refills: 1 | Status: SHIPPED | OUTPATIENT
Start: 2021-12-16

## 2021-12-16 NOTE — PROGRESS NOTES
Chiquis Searskopovych  80 y.o. female  10/5/1929  [place] [address]  [id number]     [address] [facility]       Encounter Date: 12/16/2021           Established Patient Visit Note: Kale Rodrigez MD    Reason for Appointment:  Chief Complaint   Patient presents with    Follow-up     Interpretor: Sharee Contreras (daughter)    History of Present Illness:  History provided by patient    Shaggy Courser is a 80 y.o. female who presents today for:    · CKD4 : she is followed by nephrology (Dr. Avi Huynh) with last visit 3 months ago  · Gout: denies any recent exacerbations  · Rosacea[de-identified] uses metrogel; she reports that this has been a little worse lately d/t needing refill  · GERD:no longer taking prilosec; takes pepcid 20mg  · On 11/26/21 patient had surgery for broken left hip. She was discharged on 12/1/21. Her daughter reports that she is followed by [facility]. ON 12/13/21 she had increased swelling of the leg and pain. She went to the ER [facility]), and there was not blood clot but she was found to have worsening CHF. They recommended increaseing furosemide (20mg tab) increase from 2 to 3 pills and follow up with cardiology (Dr. Azam Barger), and she has follow up apt on 12/29/21. On 12/21/21 she has follow up  With her surgeon (Dr. Ashlee Mosquera). Her daughter reports that she continues to have pain and swelling in her leg for which she is taking oxycodone (prescribed by her surgeon). She will complete Lovenox on day of follow up with her surgeon. She is also daking Iron 325 daily for anemia. · Anemia: taking iron 325 daily.    · Prediabetes: last A1c was 6.0 3/16/2017  · Hep C: her daughter reports that this has not been treated; she is not followed by hepatology        Review of Systems  See HPI      Allergies: Ace inhibitors and Pcn [penicillins]    Medications: (Updated to reflect final medication list after visit)    Current Outpatient Medications:     calcium carbonate/vitamin D3 (OS-GEOVANI 500 + D PO), Take  by mouth., Disp: , Rfl:     acetaminophen (Tylenol Extra Strength) 500 mg tablet, Take 500 mg by mouth every six (6) hours as needed for Pain., Disp: , Rfl:     famotidine (Pepcid) 20 mg tablet, Take 20 mg by mouth daily. , Disp: , Rfl:     senna-docusate (Senokot-S) 8.6-50 mg per tablet, Take 1 Tablet by mouth daily. , Disp: 90 Tablet, Rfl: 1    metroNIDAZOLE (METROGEL) 1 % topical gel, Apply thin film to affected area once daily, Disp: 60 g, Rfl: 2    FeroSuL 325 mg (65 mg iron) tablet, Take 1 Tablet by mouth daily. , Disp: 90 Tablet, Rfl: 1    enoxaparin (LOVENOX) 30 mg/0.3 mL injection, , Disp: , Rfl:     metoprolol succinate (TOPROL-XL) 50 mg XL tablet, TAKE 1 TABLET EVERY DAY, Disp: 90 Tablet, Rfl: 3    allopurinoL (ZYLOPRIM) 100 mg tablet, Take 3 Tabs by mouth daily. (Patient taking differently: Take 200 mg by mouth daily.), Disp: 270 Tab, Rfl: 3    furosemide (LASIX) 20 mg tablet, Take 1 Tab by mouth daily. (Patient taking differently: Take 60 mg by mouth daily.), Disp: 90 Tab, Rfl: 3    losartan (COZAAR) 50 mg tablet, Take 2 Tabs by mouth daily. (Patient taking differently: Take 25 mg by mouth daily.), Disp: 180 Tab, Rfl: 3    nitroglycerin (NITROSTAT) 0.4 mg SL tablet, Take 1 tablet SL at first sign of attack; repeat every 5 minutes if needed for a total of 3 tablets in 15 minutes; if no relief, call 911, Disp: 30 Tab, Rfl: 0    History  Patient Care Team:  Richelle Anderson MD as PCP - General (Family Medicine)  Richelle Anderson MD as PCP - [facility]  Lacho Sneed MD as Physician (Internal Medicine)    Past Medical History: she has a past medical history of Chronic hepatitis C without hepatic coma ([facility] [address] (02/01/2018), Duodenal ulcer, Gout, HTN, goal below 150/90 (2/1/2018), and Osteoarthritis. Past Surgical History: she has a past surgical history that includes hx hysterectomy and hx cataract removal (Bilateral, 2008).     Family Medical History: family history includes No Known Problems in her father and mother. Social History: she reports that she has never smoked. She has never used smokeless tobacco. She reports that she does not drink alcohol and does not use drugs. Objective:     Physical Exam    Constitutional:       General: Not in acute distress. Appearance: Normal appearance. Not ill-appearing,  Not toxic-appearing. HENT:      Head: Normocephalic. Right Ear: External ear normal.      Left Ear: External ear normal.      Nose: Nose normal.   Eyes:      General: No scleral icterus. Right eye: No discharge. Left eye: No discharge. Extraocular Movements: Extraocular movements intact. Conjunctiva/sclera: Conjunctivae normal.   Neck:      Musculoskeletal: Normal range of motion. Pulmonary:      Effort: Pulmonary effort is normal. No respiratory distress. Neurological:      Mental Status: Mental status is at baseline. Psychiatric:         Mood and Affect: Mood normal.         Behavior: Behavior normal.         Thought Content: Thought content normal.         Judgment: Judgment normal.       Assessment & Plan:      ICD-10-CM ICD-9-CM    1. Essential hypertension  A06 539.8 METABOLIC PANEL, COMPREHENSIVE      TSH 3RD GENERATION      METABOLIC PANEL, COMPREHENSIVE      TSH 3RD GENERATION      CANCELED: METABOLIC PANEL, COMPREHENSIVE      CANCELED: TSH 3RD GENERATION   2. Rosacea  L71.9 695.3 metroNIDAZOLE (METROGEL) 1 % topical gel   3. Chronic gout without tophus, unspecified cause, unspecified site  M1A. 9XX0 274.02    4. Iron deficiency anemia, unspecified iron deficiency anemia type  D50.9 280.9 FeroSuL 325 mg (65 mg iron) tablet      IRON PROFILE      CBC W/O DIFF      IRON PROFILE      CBC W/O DIFF      CANCELED: CBC W/O DIFF      CANCELED: IRON PROFILE   5. Constipation, unspecified constipation type  K59.00 564.00 senna-docusate (Senokot-S) 8.6-50 mg per tablet   6.  Chronic hepatitis C without hepatic coma (HCC)  B18.2 070.54 REFERRAL TO LIVER HEPATOLOGY      HCV AB W/RFLX TO REJI      HCV AB W/RFLX TO REJI   7. Screening for cardiovascular condition  Z13.6 V81.2 LIPID PANEL      LIPID PANEL      CANCELED: LIPID PANEL   8. Prediabetes  R73.03 790.29 HEMOGLOBIN A1C WITH EAG      HEMOGLOBIN A1C WITH EAG      CANCELED: HEMOGLOBIN A1C WITH EAG   9. Post-menopausal  Z78.0 V49.81 DEXA BONE DENSITY STUDY AXIAL   10. Chronic kidney disease, stage IV (severe) (HCC)  N18.4 585.4    11. HTN, goal below 150/90  I10 401.9        · CKD4 : Chronic, stable. Follow up with nephrology as scheduled. · Gout: Chronic, stable. Continue to monitor. · Rosacea::Chronic, worsening d/t being out of rx. Will send metrogel to pharmacy. · GERD:Chronic, stable. Continue current therapy. · CHF: Chronic, stable. Continue current regimen and diuresis per cardiology and follow up with cardiology as scheduled  · Anemia: Chronic, unclear control. Check labs and continue iron supplementation. · Prediabetes: Chronic, stable. Continue to monitor. · Hep C: Chronic, uncontrolled. Referral to to hepatology. I was in the office while conducting this encounter. Consent:  She and/or her healthcare decision maker is aware that this patient-initiated Telehealth encounter is a billable service, with coverage as determined by her insurance carrier. She is aware that she may receive a bill and has provided verbal consent to proceed: Yes    This virtual visit was conducted via [address] Pursuant to the emergency declaration under the [facility] waiver authority and the [employer] General Act, this Virtual  Visit was conducted to reduce the patient's risk of exposure to COVID-19 and provide continuity of care for an established patient. Services were provided through a video synchronous discussion virtually to substitute for in-person clinic visit. Due to this being a TeleHealth evaluation, many elements of the physical examination are unable to be assessed. Total Time: minutes: 21-30 minutes. I have discussed the diagnosis with the patient and the intended plan as seen in the above orders. The patient has received an after-visit summary along with patient information handout. I have discussed medication side effects and warnings with the patient as well. Disposition  Follow-up and Dispositions    · Return in about 4 weeks (around 1/13/2022).            Kamran Wood MD

## 2021-12-17 ENCOUNTER — TELEPHONE (OUTPATIENT)
Dept: FAMILY MEDICINE CLINIC | Age: 86
End: 2021-12-17

## 2021-12-17 DIAGNOSIS — R53.1 WEAKNESS GENERALIZED: ICD-10-CM

## 2021-12-17 DIAGNOSIS — D50.9 IRON DEFICIENCY ANEMIA, UNSPECIFIED IRON DEFICIENCY ANEMIA TYPE: ICD-10-CM

## 2021-12-17 DIAGNOSIS — R53.81 DEBILITY: ICD-10-CM

## 2021-12-17 DIAGNOSIS — I10 ESSENTIAL HYPERTENSION: Primary | ICD-10-CM

## 2021-12-22 PROBLEM — N18.4 CHRONIC KIDNEY DISEASE, STAGE IV (SEVERE) (HCC): Status: ACTIVE | Noted: 2021-12-22

## 2021-12-23 ENCOUNTER — TELEPHONE (OUTPATIENT)
Dept: FAMILY MEDICINE CLINIC | Age: 86
End: 2021-12-23

## 2021-12-23 NOTE — LETTER
December 28, 2021  [phone]    Dear Bree Garner: Thank you for requesting access to [employer] Please follow the instructions below to securely access and download your online medical record. [employer] allows you to send messages to your doctor, view your test results, renew your prescriptions, schedule appointments, and more. How Do I Sign Up? 1. In your internet browser, go to [email or web address]. 2. Click on the First Time User? Click Here link in the Sign In box. You will see the New Member Sign Up page. 3. Enter your [employer] Access Code exactly as it appears below. You will not need to use this code after youve completed the sign-up process. If you do not sign up before the expiration date, you must request a new code. [employer] Access Code: [email or web address]  Expires: 1/16/2022  1:35 PM     4. Enter the last four digits of your Social Security Number (xxxx) and Date of Birth (mm/dd/yyyy) as indicated and click Submit. You will be taken to the next sign-up page. 5. Create a [employer] ID. This will be your [employer] login ID and cannot be changed, so think of one that is secure and easy to remember. 6. Create a [employer] password. You can change your password at any time. 7. Enter your Password Reset Question and Answer. This can be used at a later time if you forget your password. 8. Enter your e-mail address. You will receive e-mail notification when new information is available in [address] 9. Click Sign Up. You can now view and download portions of your medical record. 10. Click the Download Summary menu link to download a portable copy of your medical information. Additional Information    If you have questions, please visit the Frequently Asked Questions section of the [employer] website at [email or web address] Remember, [employer] is NOT to be used for urgent needs. For medical emergencies, dial 911.     Now available from your iPhone and Android!     Sincerely,   [employer]

## 2021-12-23 NOTE — Clinical Note
12/28/2021 1:04 PM    Ms. Swetha Marrero [facility] [phone]              Sincerely,      Pradeep Martinez MD

## 2021-12-23 NOTE — TELEPHONE ENCOUNTER
Pt's daughter  Eugene Joseph  called and requested 12/20/21 lab results. Ordered by Dr. Dorcas Burns and done by [facility].      Also, needs the My chart activation code    BCB# [phone]

## 2021-12-28 NOTE — TELEPHONE ENCOUNTER
Chief Complaint   Patient presents with    Labs     Returned call to [facility] at number listed ( not working ). Reached out to number listed on chart [phone]. Amanda Tucker LPN    Spoke with Jose ( PHI ) 2 identifiers confirmed. Informed Ms. Jose that as of yet the labs have not been reviewed by provider and will need to make sure if the lab have actually been faxed to our office. Informed that once confirmed our office has the labs, will make Dr. Luzma Chavira aware of the inquiry for results. Mychart activation letter sent.    Amanda Tucker LPN

## 2021-12-30 ENCOUNTER — TELEPHONE (OUTPATIENT)
Dept: FAMILY MEDICINE CLINIC | Age: 86
End: 2021-12-30

## 2021-12-30 NOTE — TELEPHONE ENCOUNTER
Reviewed labs from 12/20/21 as follows:  CMP: s/f Creat 1.71- patient followed by nephrology for CKD  CBC: s/f Hgb 9.7 (known anemia on iron in setting of recent hip surgery)  Iron Profile: Iron sat of 12 and iron of 42  HCV Ab: positive antibody, but unable to reflex to HCV RNA d/t insufficient specimen  Lipids: Chol 135, Tri 121, HDL 42, LDL 71        Please let patient know that I reviewed her labs, and we will discuss them further at her scheduled visit.        Mingo Galvez MD

## 2021-12-30 NOTE — TELEPHONE ENCOUNTER
Called and informed patient that Dr. Veronica Villela reviewed her labs, and we will discuss them further at her scheduled visit.

## 2022-01-12 ENCOUNTER — OFFICE VISIT (OUTPATIENT)
Dept: FAMILY MEDICINE CLINIC | Age: 87
End: 2022-01-12
Payer: MEDICARE

## 2022-01-12 VITALS
TEMPERATURE: 98.3 F | OXYGEN SATURATION: 98 % | RESPIRATION RATE: 16 BRPM | SYSTOLIC BLOOD PRESSURE: 133 MMHG | HEIGHT: 63 IN | DIASTOLIC BLOOD PRESSURE: 78 MMHG | HEART RATE: 75 BPM | WEIGHT: 171 LBS | BODY MASS INDEX: 30.3 KG/M2

## 2022-01-12 DIAGNOSIS — R73.02 IGT (IMPAIRED GLUCOSE TOLERANCE): ICD-10-CM

## 2022-01-12 DIAGNOSIS — Z23 ENCOUNTER FOR IMMUNIZATION: ICD-10-CM

## 2022-01-12 DIAGNOSIS — M1A.9XX0 CHRONIC GOUT WITHOUT TOPHUS, UNSPECIFIED CAUSE, UNSPECIFIED SITE: ICD-10-CM

## 2022-01-12 DIAGNOSIS — K29.70 GASTRITIS, PRESENCE OF BLEEDING UNSPECIFIED, UNSPECIFIED CHRONICITY, UNSPECIFIED GASTRITIS TYPE: ICD-10-CM

## 2022-01-12 DIAGNOSIS — N18.4 CHRONIC KIDNEY DISEASE, STAGE IV (SEVERE) (HCC): ICD-10-CM

## 2022-01-12 DIAGNOSIS — K21.9 GASTROESOPHAGEAL REFLUX DISEASE, UNSPECIFIED WHETHER ESOPHAGITIS PRESENT: Primary | ICD-10-CM

## 2022-01-12 DIAGNOSIS — L71.9 ROSACEA: ICD-10-CM

## 2022-01-12 DIAGNOSIS — D50.9 IRON DEFICIENCY ANEMIA, UNSPECIFIED IRON DEFICIENCY ANEMIA TYPE: ICD-10-CM

## 2022-01-12 DIAGNOSIS — B18.2 CHRONIC HEPATITIS C WITHOUT HEPATIC COMA (HCC): ICD-10-CM

## 2022-01-12 DIAGNOSIS — I10 ESSENTIAL HYPERTENSION: ICD-10-CM

## 2022-01-12 LAB
ALBUMIN SERPL-MCNC: 3.5 G/DL (ref 3.5–5)
ALBUMIN/GLOB SERPL: 1.1 {RATIO} (ref 1.1–2.2)
ALP SERPL-CCNC: 60 U/L (ref 45–117)
ALT SERPL-CCNC: 21 U/L (ref 12–78)
ANION GAP SERPL CALC-SCNC: 8 MMOL/L (ref 5–15)
AST SERPL-CCNC: 25 U/L (ref 15–37)
BILIRUB SERPL-MCNC: 0.7 MG/DL (ref 0.2–1)
BUN SERPL-MCNC: 46 MG/DL (ref 6–20)
BUN/CREAT SERPL: 22 (ref 12–20)
CALCIUM SERPL-MCNC: 9.6 MG/DL (ref 8.5–10.1)
CHLORIDE SERPL-SCNC: 111 MMOL/L (ref 97–108)
CO2 SERPL-SCNC: 25 MMOL/L (ref 21–32)
CREAT SERPL-MCNC: 2.13 MG/DL (ref 0.55–1.02)
ERYTHROCYTE [DISTWIDTH] IN BLOOD BY AUTOMATED COUNT: 17.7 % (ref 11.5–14.5)
GLOBULIN SER CALC-MCNC: 3.1 G/DL (ref 2–4)
GLUCOSE SERPL-MCNC: 110 MG/DL (ref 65–100)
HCT VFR BLD AUTO: 28.8 % (ref 35–47)
HGB BLD-MCNC: 9 G/DL (ref 11.5–16)
IRON SATN MFR SERPL: 9 % (ref 20–50)
IRON SERPL-MCNC: 32 UG/DL (ref 35–150)
MCH RBC QN AUTO: 30.7 PG (ref 26–34)
MCHC RBC AUTO-ENTMCNC: 31.3 G/DL (ref 30–36.5)
MCV RBC AUTO: 98.3 FL (ref 80–99)
NRBC # BLD: 0 K/UL (ref 0–0.01)
NRBC BLD-RTO: 0 PER 100 WBC
PLATELET # BLD AUTO: 276 K/UL (ref 150–400)
PMV BLD AUTO: 10.6 FL (ref 8.9–12.9)
POTASSIUM SERPL-SCNC: 3.4 MMOL/L (ref 3.5–5.1)
PROT SERPL-MCNC: 6.6 G/DL (ref 6.4–8.2)
RBC # BLD AUTO: 2.93 M/UL (ref 3.8–5.2)
SODIUM SERPL-SCNC: 144 MMOL/L (ref 136–145)
TIBC SERPL-MCNC: 358 UG/DL (ref 250–450)
WBC # BLD AUTO: 9.2 K/UL (ref 3.6–11)

## 2022-01-12 PROCEDURE — G8432 DEP SCR NOT DOC, RNG: HCPCS | Performed by: FAMILY MEDICINE

## 2022-01-12 PROCEDURE — G8536 NO DOC ELDER MAL SCRN: HCPCS | Performed by: FAMILY MEDICINE

## 2022-01-12 PROCEDURE — 99213 OFFICE O/P EST LOW 20 MIN: CPT | Performed by: FAMILY MEDICINE

## 2022-01-12 PROCEDURE — 90662 IIV NO PRSV INCREASED AG IM: CPT | Performed by: FAMILY MEDICINE

## 2022-01-12 PROCEDURE — G8417 CALC BMI ABV UP PARAM F/U: HCPCS | Performed by: FAMILY MEDICINE

## 2022-01-12 PROCEDURE — 1090F PRES/ABSN URINE INCON ASSESS: CPT | Performed by: FAMILY MEDICINE

## 2022-01-12 PROCEDURE — G0463 HOSPITAL OUTPT CLINIC VISIT: HCPCS | Performed by: FAMILY MEDICINE

## 2022-01-12 PROCEDURE — 1101F PT FALLS ASSESS-DOCD LE1/YR: CPT | Performed by: FAMILY MEDICINE

## 2022-01-12 PROCEDURE — G8427 DOCREV CUR MEDS BY ELIG CLIN: HCPCS | Performed by: FAMILY MEDICINE

## 2022-01-12 RX ORDER — OMEPRAZOLE 40 MG/1
40 CAPSULE, DELAYED RELEASE ORAL DAILY
Qty: 90 CAPSULE | Refills: 1 | Status: SHIPPED | OUTPATIENT
Start: 2022-01-12 | End: 2022-08-25

## 2022-01-12 NOTE — PROGRESS NOTES
Chief Complaint   Patient presents with    Follow-up        1. \"Have you been to the ER, urgent care clinic since your last visit? Hospitalized since your last visit? \" No    2. \"Have you seen or consulted any other health care providers outside of the [address] [place] [address] since your last visit? \" Yes Reason for visit: Kidney specialist     3. For patients aged 39-70: Has the patient had a colonoscopy? No     If the patient is female:    4. For patients aged 41-77: Has the patient had a mammogram within the past 2 years? NA based on age or sex    11. For patients aged 21-30: Has the patient had a pap smear?  NA based on age or sex    3 most recent PHQ Screens 10/30/2020   Little interest or pleasure in doing things Not at all   Feeling down, depressed, irritable, or hopeless Not at all   Total Score PHQ 2 0

## 2022-01-12 NOTE — PROGRESS NOTES
Jessica Settler Prokopovych  80 y.o. female  10/5/1929  [place] [phone]  [id number]     [address] [facility]       Encounter Date: 1/12/2022           Established Patient Visit Note: Jerome Tsang MD    Reason for Appointment:  Chief Complaint   Patient presents with    Follow-up         History of Present Illness:  History provided by patient    Kirk Anderson is a 80 y.o. female who presents to clinic today for:       · CKD4 : she is followed by nephrology (Dr. Kristin Ortiz). Her daughter reports that Dr. Lorie Strauss increased furosemide to 80mg daily if needed based on daily weight. She has follow up visit in March. · LE Swelling: (see HPI for CKD); her daughter reports that she had negative LE duplex in ER in Dec. She also reports that she discussed this with ortho who was not suspicious for DVT. Her daugher would like to hold off on additional LE duplex. · Gout, Hx of Gastrititis: denies any recent exacerbations; caregiver would like refill of omeprazole  · Rosacea[de-identified] uses metrogel; she reports that this has been a little worse lately d/t needing refill  · GERD:no longer taking prilosec; takes pepcid 20mg  · Left Hip Fracture s/p surgery: followed by ortho with next visit in Feb; bone density testing is scheduled for 1/25/22. · Anemia: taking iron 325 daily. Recent home health labs showed iron and hemoglobin to remain a little low. · IGT: last A1c was 5.2 on recent home health labs  · Hep C: her daughter reports that this has not been treated; hepatology visit scheduled for 4/25/22     Her daugher reports that she has had both pneumonia vaccines. Review of Systems  Review of Systems   Constitutional: Negative for chills and fever. Respiratory: Negative for cough, shortness of breath and wheezing. Cardiovascular: Negative for chest pain and palpitations.           Allergies: Ace inhibitors and Pcn [penicillins]    Medications:     Current Outpatient Medications:   omeprazole (PRILOSEC) 40 mg capsule, Take 1 Capsule by mouth daily. , Disp: 90 Capsule, Rfl: 1    calcium carbonate/vitamin D3 (OS-GEOVANI 500 + D PO), Take  by mouth., Disp: , Rfl:     acetaminophen (Tylenol Extra Strength) 500 mg tablet, Take 500 mg by mouth every six (6) hours as needed for Pain., Disp: , Rfl:     senna-docusate (Senokot-S) 8.6-50 mg per tablet, Take 1 Tablet by mouth daily. , Disp: 90 Tablet, Rfl: 1    metroNIDAZOLE (METROGEL) 1 % topical gel, Apply thin film to affected area once daily, Disp: 60 g, Rfl: 2    FeroSuL 325 mg (65 mg iron) tablet, Take 1 Tablet by mouth daily. , Disp: 90 Tablet, Rfl: 1    metoprolol succinate (TOPROL-XL) 50 mg XL tablet, TAKE 1 TABLET EVERY DAY, Disp: 90 Tablet, Rfl: 3    allopurinoL (ZYLOPRIM) 100 mg tablet, Take 3 Tabs by mouth daily. (Patient taking differently: Take 200 mg by mouth daily.), Disp: 270 Tab, Rfl: 3    furosemide (LASIX) 20 mg tablet, Take 1 Tab by mouth daily. (Patient taking differently: Take 60 mg by mouth daily.), Disp: 90 Tab, Rfl: 3    losartan (COZAAR) 50 mg tablet, Take 2 Tabs by mouth daily. (Patient taking differently: Take 25 mg by mouth daily.), Disp: 180 Tab, Rfl: 3    nitroglycerin (NITROSTAT) 0.4 mg SL tablet, Take 1 tablet SL at first sign of attack; repeat every 5 minutes if needed for a total of 3 tablets in 15 minutes; if no relief, call 911, Disp: 30 Tab, Rfl: 0    History  Patient Care Team:  Bita Huang MD as PCP - General (Family Medicine)  Bita Huang MD as PCP - [address] [place] [address] Provider  Pieter Phalen, MD as Physician (Internal Medicine)    Past Medical History: she has a past medical history of Chronic hepatitis C without hepatic coma ([facility] [address] (02/01/2018), Duodenal ulcer, Gout, HTN, goal below 150/90 (2/1/2018), and Osteoarthritis. Past Surgical History: she has a past surgical history that includes hx hysterectomy and hx cataract removal (Bilateral, 2008).     Family Medical History: family history includes No Known Problems in her father and mother. Social History: she reports that she has never smoked. She has never used smokeless tobacco. She reports that she does not drink alcohol and does not use drugs. Objective:   Visit Vitals  /78   Pulse 75   Temp 98.3 °F (36.8 °C)   Resp 16   Ht 5' 3\" (1.6 m)   Wt 171 lb (77.6 kg)   SpO2 98%   BMI 30.29 kg/m²     Wt Readings from Last 3 Encounters:   01/12/22 171 lb (77.6 kg)   10/30/20 187 lb (84.8 kg)   08/18/20 179 lb (81.2 kg)       Physical Exam  Vitals and nursing note reviewed. Constitutional:       General: She is not in acute distress. Appearance: Normal appearance. HENT:      Head: Normocephalic and atraumatic. Nose: Nose normal.      Mouth/Throat:      Mouth: Mucous membranes are moist.   Eyes:      Extraocular Movements: Extraocular movements intact. Conjunctiva/sclera: Conjunctivae normal.      Pupils: Pupils are equal, round, and reactive to light. Cardiovascular:      Rate and Rhythm: Normal rate and regular rhythm. Pulses: Normal pulses. Heart sounds: Normal heart sounds. No murmur heard. No friction rub. No gallop. Pulmonary:      Effort: Pulmonary effort is normal. No respiratory distress. Breath sounds: Normal breath sounds. No wheezing, rhonchi or rales. Musculoskeletal:         General: Normal range of motion. Cervical back: Normal range of motion and neck supple. No rigidity. No muscular tenderness. Lymphadenopathy:      Cervical: No cervical adenopathy. Skin:     General: Skin is warm. Coloration: Skin is not jaundiced. Neurological:      General: No focal deficit present. Mental Status: She is alert. Mental status is at baseline. Cranial Nerves: Cranial nerves are intact. Motor: Motor function is intact. Coordination: Coordination is intact.    Psychiatric:         Mood and Affect: Mood normal.         Behavior: Behavior normal.         Thought Content: Thought content normal.         Judgment: Judgment normal.         Assessment & Plan:      ICD-10-CM ICD-9-CM    1. Gastroesophageal reflux disease, unspecified whether esophagitis present  K21.9 530.81 omeprazole (PRILOSEC) 40 mg capsule   2. Gastritis, presence of bleeding unspecified, unspecified chronicity, unspecified gastritis type  K29.70 535.50 omeprazole (PRILOSEC) 40 mg capsule   3. Chronic kidney disease, stage IV (severe) (HCC)  N18.4 585.4    4. Chronic hepatitis C without hepatic coma (HCC)  B18.2 070.54 CANCELED: HEP C GENOTYPE      CANCELED: HEP C GENOTYPE   5. Essential hypertension  E79 062.6 METABOLIC PANEL, COMPREHENSIVE      METABOLIC PANEL, COMPREHENSIVE   6. Iron deficiency anemia, unspecified iron deficiency anemia type  D50.9 280.9 IRON PROFILE      CBC W/O DIFF      IRON PROFILE      CBC W/O DIFF   7. Encounter for immunization  Z23 V03.89 INFLUENZA VIRUS VACCINE, HIGH DOSE SEASONAL, PRESERVATIVE FREE   8. Rosacea  L71.9 695.3    9. IGT (impaired glucose tolerance)  R73.02 790.22    10. Chronic gout without tophus, unspecified cause, unspecified site  M1A. 9XX0 274.02        · GERD: Chronic, uncontrolled. Stop pepcid. Start omeprazole. · Left Hip Fracture s/p surgery: Chronic, stable. Follow up for DEXA as scheduled. · Hep C: Chronic, uncontrolled. Check hepatitis genotype and follow up with hepatology as scheduled. All other conditions listed above: chronic and stable. Will continue current treatment regimen. Will check labs as reflected above. Discussed following up with specialist as scheduled. Discussed recommendations for diet and exercise. I have discussed the diagnosis with the patient and the intended plan as seen in the above orders. The patient has received an after-visit summary along with patient information handout. I have discussed medication side effects and warnings with the patient as well.     Disposition  Follow-up and Dispositions    · Return in about 3 months (around 4/12/2022) for medicare wellness exam.           Burnett Osgood, MD

## 2022-01-15 LAB
HCV GENTYP SERPL NAA+PROBE: NORMAL
PLEASE NOTE, 550474: NORMAL

## 2022-01-18 ENCOUNTER — TELEPHONE (OUTPATIENT)
Dept: FAMILY MEDICINE CLINIC | Age: 87
End: 2022-01-18

## 2022-01-18 NOTE — TELEPHONE ENCOUNTER
Daniel Garcia () [phone] (H)     Pt daughter would like the lab results. Please call back and advise.

## 2022-01-18 NOTE — TELEPHONE ENCOUNTER
Called, spoke to pt. Daughter   Two pt identifiers confirmed. Writer infomed daughter of  Her mom  lab results and lab appt  Was made. Letter also mailed again.

## 2022-02-15 ENCOUNTER — DOCUMENTATION ONLY (OUTPATIENT)
Dept: FAMILY MEDICINE CLINIC | Age: 87
End: 2022-02-15

## 2022-02-15 ENCOUNTER — APPOINTMENT (OUTPATIENT)
Dept: FAMILY MEDICINE CLINIC | Age: 87
End: 2022-02-15

## 2022-02-21 LAB
ALBUMIN SERPL-MCNC: 4 G/DL (ref 3.5–4.6)
ALBUMIN/GLOB SERPL: 1.7 {RATIO} (ref 1.2–2.2)
ALP SERPL-CCNC: 88 IU/L (ref 44–121)
ALT SERPL-CCNC: 24 IU/L (ref 0–32)
AST SERPL-CCNC: 50 IU/L (ref 0–40)
BILIRUB SERPL-MCNC: 0.5 MG/DL (ref 0–1.2)
BUN SERPL-MCNC: 46 MG/DL (ref 10–36)
BUN/CREAT SERPL: 25 (ref 12–28)
CALCIUM SERPL-MCNC: 9.5 MG/DL (ref 8.7–10.3)
CHLORIDE SERPL-SCNC: 102 MMOL/L (ref 96–106)
CHOLEST SERPL-MCNC: 141 MG/DL (ref 100–199)
CO2 SERPL-SCNC: 20 MMOL/L (ref 20–29)
CREAT SERPL-MCNC: 1.87 MG/DL (ref 0.57–1)
ERYTHROCYTE [DISTWIDTH] IN BLOOD BY AUTOMATED COUNT: 15.3 % (ref 11.7–15.4)
EST. AVERAGE GLUCOSE BLD GHB EST-MCNC: 100 MG/DL
GLOBULIN SER CALC-MCNC: 2.3 G/DL (ref 1.5–4.5)
GLUCOSE SERPL-MCNC: 92 MG/DL (ref 65–99)
HBA1C MFR BLD: 5.1 % (ref 4.8–5.6)
HCT VFR BLD AUTO: 31.8 % (ref 34–46.6)
HCV AB S/CO SERPL IA: >11 S/CO RATIO (ref 0–0.9)
HCV RNA SERPL QL NAA+PROBE: POSITIVE
HDLC SERPL-MCNC: 49 MG/DL
HGB BLD-MCNC: 10.4 G/DL (ref 11.1–15.9)
IMP & REVIEW OF LAB RESULTS: NORMAL
INTERPRETATION: NORMAL
INTERPRETATION: NORMAL
IRON SATN MFR SERPL: 15 % (ref 15–55)
IRON SERPL-MCNC: 48 UG/DL (ref 27–139)
LDLC SERPL CALC-MCNC: 75 MG/DL (ref 0–99)
MCH RBC QN AUTO: 30.6 PG (ref 26.6–33)
MCHC RBC AUTO-ENTMCNC: 32.7 G/DL (ref 31.5–35.7)
MCV RBC AUTO: 94 FL (ref 79–97)
PLATELET # BLD AUTO: 241 X10E3/UL (ref 150–450)
POTASSIUM SERPL-SCNC: 4.3 MMOL/L (ref 3.5–5.2)
PROT SERPL-MCNC: 6.3 G/DL (ref 6–8.5)
RBC # BLD AUTO: 3.4 X10E6/UL (ref 3.77–5.28)
SODIUM SERPL-SCNC: 140 MMOL/L (ref 134–144)
TIBC SERPL-MCNC: 320 UG/DL (ref 250–450)
TRIGL SERPL-MCNC: 91 MG/DL (ref 0–149)
TSH SERPL DL<=0.005 MIU/L-ACNC: 2.71 UIU/ML (ref 0.45–4.5)
UIBC SERPL-MCNC: 272 UG/DL (ref 118–369)
VLDLC SERPL CALC-MCNC: 17 MG/DL (ref 5–40)
WBC # BLD AUTO: 7.9 X10E3/UL (ref 3.4–10.8)

## 2022-03-02 NOTE — PROGRESS NOTES
Please let patient know that her hemoglobin is improved from last check. We will continue to monitor this. Her labs are positive for hepatitis C, and she should follow up with hepatology as scheduled on 4/25/22. The remainder of her labs are stable.

## 2022-03-08 ENCOUNTER — TELEPHONE (OUTPATIENT)
Dept: FAMILY MEDICINE CLINIC | Age: 87
End: 2022-03-08

## 2022-03-17 NOTE — TELEPHONE ENCOUNTER
Patients daughter, Kane Somers is calling in regards to a missed call from Kendy Benito, tried contacting nurse, no success  Best call back # for Kane Somers: [phone] Pt with generalized body pain, back, left hip and epigastric pain which is somatic  in nature due to sickle cell crisis and gastritis. Pt on chronic opioids at home oxycodone 30mg q4-6h PRN. Retic percent 15.9. Bilirubin 7.9. CTAP demonstrates Hepatic morphology is nonspecific but can be seen with underlying cirrhosis. Enlarged upper abdominal lymph nodes are nonspecific and can be seen in the setting of portal hypertension. MRI abdomen demonstrates likely cirrhosis, small ascites, b/l pleural effusions. S/p 1 unit PRBC 3/16 AM.   Opioid pain recommendations    - Continue dilaudid 2mg IV q3hr PRN severe pain. Monitor for sedation/ respiratory depression.   Non-opioid pain recommendations   - Pt with cirrhosis and transaminitis- caution use of acetaminophen  - Avoid nsaids- gastritis   Bowel Regimen  - Continue Miralax 17G PO daily  Mild pain   - Non-pharmacological pain treatment recommendations  - Warm/ Cool packs PRN   - Repositioning, imagery, relaxation, distraction.  - OOB if no contraindications   Continue IVF per primary team.   Recommendations discussed with primary team and RN  Pt will not require prescription opioids at discharge, has oxycodone at home. Pt with generalized body pain, back, left hip and epigastric pain which is somatic  in nature due to sickle cell crisis and gastritis. Pt on chronic opioids at home oxycodone 30mg q4-6h PRN. Retic percent 15.9. Bilirubin 7.9. CTAP demonstrates Hepatic morphology is nonspecific but can be seen with underlying cirrhosis. Enlarged upper abdominal lymph nodes are nonspecific and can be seen in the setting of portal hypertension. MRI abdomen demonstrates likely cirrhosis, small ascites, b/l pleural effusions. S/p 1 unit PRBC 3/16 AM.   Opioid pain recommendations    - Continue dilaudid 2mg IV q3hr PRN severe pain. Monitor for sedation/ respiratory depression.   Non-opioid pain recommendations   - Pt with cirrhosis and transaminitis- caution use of acetaminophen  - Avoid nsaids- gastritis   Bowel Regimen  - Continue Miralax 17G PO daily  Mild pain   - Non-pharmacological pain treatment recommendations  - Warm/ Cool packs PRN   - Repositioning, imagery, relaxation, distraction.  - OOB if no contraindications   Continue IVF per primary team.   Recommendations discussed with primary team and RN  Pt will not require prescription opioids at discharge, has oxycodone at home. narcan kit prescribed, Pt with generalized body pain, back, left hip and epigastric pain which is somatic  in nature due to sickle cell crisis and gastritis. Pt on chronic opioids at home oxycodone 30mg q4-6h PRN. Retic percent 15.9. Bilirubin 7.9. CTAP demonstrates Hepatic morphology is nonspecific but can be seen with underlying cirrhosis. Enlarged upper abdominal lymph nodes are nonspecific and can be seen in the setting of portal hypertension. MRI abdomen demonstrates likely cirrhosis, small ascites, b/l pleural effusions. S/p 1 unit PRBC 3/16 AM.   Opioid pain recommendations    - Continue dilaudid 2mg IV q3hr PRN severe pain. Monitor for sedation/ respiratory depression.   Non-opioid pain recommendations   - Pt with cirrhosis and transaminitis- caution use of acetaminophen  - Avoid nsaids- gastritis   Bowel Regimen  - Continue Miralax 17G PO daily  Mild pain   - Non-pharmacological pain treatment recommendations  - Warm/ Cool packs PRN   - Repositioning, imagery, relaxation, distraction.  - OOB if no contraindications   Continue IVF per primary team.   Recommendations discussed with primary team and RN  Pt will not require prescription opioids at discharge, has oxycodone at home. narcan kit prescribed. Opioid and narcan kit education provided including safe opioid storage and disposal, overdose s/s, high risk situations, and reposone- call 911, administering narcan rescue dose, rescue breathing, administering second dose if needed. Teach back provided to ensure understanding. Replacing narcan kit when used or  and proper storage information provided. Pt reports using narcan kit in the past. Verbalized undertanding of narcan, narcan kit usage and opioid education, teach back utilized to ensure understanding.

## 2022-03-18 PROBLEM — N18.4 CHRONIC KIDNEY DISEASE, STAGE IV (SEVERE) (HCC): Status: ACTIVE | Noted: 2021-12-22

## 2022-03-18 PROBLEM — M10.9 GOUT: Status: ACTIVE | Noted: 2018-02-01

## 2022-03-19 PROBLEM — B18.2 CHRONIC HEPATITIS C WITHOUT HEPATIC COMA (HCC): Status: ACTIVE | Noted: 2018-02-01

## 2022-03-19 PROBLEM — I10 HTN, GOAL BELOW 150/90: Status: ACTIVE | Noted: 2018-02-01

## 2022-08-25 ENCOUNTER — OFFICE VISIT (OUTPATIENT)
Dept: FAMILY MEDICINE CLINIC | Age: 87
End: 2022-08-25
Payer: MEDICARE

## 2022-08-25 VITALS
SYSTOLIC BLOOD PRESSURE: 130 MMHG | OXYGEN SATURATION: 98 % | HEART RATE: 60 BPM | BODY MASS INDEX: 29.06 KG/M2 | TEMPERATURE: 97.8 F | HEIGHT: 63 IN | WEIGHT: 164 LBS | RESPIRATION RATE: 16 BRPM | DIASTOLIC BLOOD PRESSURE: 80 MMHG

## 2022-08-25 DIAGNOSIS — B18.2 CHRONIC HEPATITIS C WITHOUT HEPATIC COMA (HCC): ICD-10-CM

## 2022-08-25 DIAGNOSIS — L71.9 ROSACEA: ICD-10-CM

## 2022-08-25 DIAGNOSIS — Z78.9 VEGETARIAN: ICD-10-CM

## 2022-08-25 DIAGNOSIS — R63.4 WEIGHT LOSS: ICD-10-CM

## 2022-08-25 DIAGNOSIS — N18.4 CHRONIC KIDNEY DISEASE, STAGE IV (SEVERE) (HCC): ICD-10-CM

## 2022-08-25 DIAGNOSIS — D50.9 IRON DEFICIENCY ANEMIA, UNSPECIFIED IRON DEFICIENCY ANEMIA TYPE: ICD-10-CM

## 2022-08-25 DIAGNOSIS — M1A.9XX0 CHRONIC GOUT WITHOUT TOPHUS, UNSPECIFIED CAUSE, UNSPECIFIED SITE: ICD-10-CM

## 2022-08-25 DIAGNOSIS — Z00.00 ENCOUNTER FOR MEDICARE ANNUAL WELLNESS EXAM: Primary | ICD-10-CM

## 2022-08-25 PROCEDURE — 1090F PRES/ABSN URINE INCON ASSESS: CPT | Performed by: FAMILY MEDICINE

## 2022-08-25 PROCEDURE — G8417 CALC BMI ABV UP PARAM F/U: HCPCS | Performed by: FAMILY MEDICINE

## 2022-08-25 PROCEDURE — G8510 SCR DEP NEG, NO PLAN REQD: HCPCS | Performed by: FAMILY MEDICINE

## 2022-08-25 PROCEDURE — G0463 HOSPITAL OUTPT CLINIC VISIT: HCPCS | Performed by: FAMILY MEDICINE

## 2022-08-25 PROCEDURE — G0439 PPPS, SUBSEQ VISIT: HCPCS | Performed by: FAMILY MEDICINE

## 2022-08-25 PROCEDURE — 1123F ACP DISCUSS/DSCN MKR DOCD: CPT | Performed by: FAMILY MEDICINE

## 2022-08-25 PROCEDURE — G8536 NO DOC ELDER MAL SCRN: HCPCS | Performed by: FAMILY MEDICINE

## 2022-08-25 PROCEDURE — 1101F PT FALLS ASSESS-DOCD LE1/YR: CPT | Performed by: FAMILY MEDICINE

## 2022-08-25 PROCEDURE — 99214 OFFICE O/P EST MOD 30 MIN: CPT | Performed by: FAMILY MEDICINE

## 2022-08-25 PROCEDURE — G8427 DOCREV CUR MEDS BY ELIG CLIN: HCPCS | Performed by: FAMILY MEDICINE

## 2022-08-25 RX ORDER — LOSARTAN POTASSIUM 50 MG/1
50 TABLET ORAL DAILY
COMMUNITY

## 2022-08-25 RX ORDER — ALLOPURINOL 100 MG/1
200 TABLET ORAL DAILY
COMMUNITY

## 2022-08-25 RX ORDER — OMEPRAZOLE 20 MG/1
20 CAPSULE, DELAYED RELEASE ORAL DAILY
COMMUNITY

## 2022-08-25 RX ORDER — METRONIDAZOLE 10 MG/G
GEL TOPICAL
Qty: 60 G | Refills: 2 | Status: SHIPPED | OUTPATIENT
Start: 2022-08-25

## 2022-08-25 NOTE — PROGRESS NOTES
Patient Name: Elvia Cameron   MRN: [id number]    Fayrene  is a 80 y.o. female who presents with the following:     Patient is status post left hip hemiarthroplasty after fall last November. She is recovering well. She previously saw Dr. Marcus Blair with nephrology for her CKD. He no longer recommended follow-up with him and instead defer to her PCP. She still sees her cardiologist for her chronic heart failure. She takes 60 mg of Lasix daily although her daughter, who is a physician, will sometimes titrate it based on her swelling. She has lost some weight over the past months. She does have low appetite and does not eat a lot of meat. Some of that weight which is possibly due to fluid buildup per daughter. She does have a history of hepatitis C that is never been treated. Patient declines to see hematology. She also declines any DEXA scans and treatments. BP Readings from Last 3 Encounters:   08/25/22 130/80   01/12/22 133/78   10/30/20 128/84     Wt Readings from Last 3 Encounters:   08/25/22 164 lb (74.4 kg)   01/12/22 171 lb (77.6 kg)   10/30/20 187 lb (84.8 kg)     US Results (most recent):  Results from [place] encounter on 05/31/18    [facility]    Narrative  INDICATION:  history of hepatitis C, untreated, elevated LFTs    EXAM: US Abdomen Limited. FINDINGS: Abdomen sonogram of the right upper quadrant is performed. The  gallbladder appears normal, with no stones, wall thickening or associated  tenderness. The bile ducts are not enlarged. CBD measures 5 mm. Liver demonstrates normal echotexture with no sonographically apparent mass. Portal and hepatic veins are patent with antegrade flow. Main portal vein  diameter is 0.9 cm with velocity 25 cm/s. Pancreatic head appears normal. Right kidney appears normal other than a cyst.  There is no free fluid in the hepatorenal fossa.     Impression  IMPRESSION: Unremarkable right upper quadrant sonogram.    Review of Systems   Constitutional:  Positive for weight loss. Negative for fever and malaise/fatigue. Respiratory:  Negative for cough, hemoptysis, shortness of breath and wheezing. Cardiovascular:  Negative for chest pain, palpitations, leg swelling and PND. Gastrointestinal:  Negative for abdominal pain, constipation, diarrhea, nausea and vomiting. The patient's medications, allergies, past medical history, surgical history, family history and social history were reviewed and updated where appropriate. Current Outpatient Medications:     metroNIDAZOLE (METROGEL) 1 % topical gel, Apply thin film to affected area once daily, Disp: 60 g, Rfl: 2    allopurinoL (ZYLOPRIM) 100 mg tablet, Take 200 mg by mouth daily. , Disp: , Rfl:     losartan (COZAAR) 50 mg tablet, Take 50 mg by mouth daily. , Disp: , Rfl:     omeprazole (PRILOSEC) 20 mg capsule, Take 20 mg by mouth daily. , Disp: , Rfl:     calcium carbonate/vitamin D3 (OS-GEOVANI 500 + D PO), Take  by mouth., Disp: , Rfl:     acetaminophen (TYLENOL) 500 mg tablet, Take 500 mg by mouth every six (6) hours as needed for Pain., Disp: , Rfl:     senna-docusate (Senokot-S) 8.6-50 mg per tablet, Take 1 Tablet by mouth daily. , Disp: 90 Tablet, Rfl: 1    FeroSuL 325 mg (65 mg iron) tablet, Take 1 Tablet by mouth daily. , Disp: 90 Tablet, Rfl: 1    metoprolol succinate (TOPROL-XL) 50 mg XL tablet, TAKE 1 TABLET EVERY DAY, Disp: 90 Tablet, Rfl: 3    furosemide (LASIX) 20 mg tablet, Take 1 Tab by mouth daily.  (Patient taking differently: Take 60 mg by mouth daily.), Disp: 90 Tab, Rfl: 3    nitroglycerin (NITROSTAT) 0.4 mg SL tablet, Take 1 tablet SL at first sign of attack; repeat every 5 minutes if needed for a total of 3 tablets in 15 minutes; if no relief, call 911, Disp: 30 Tab, Rfl: 0    Allergies   Allergen Reactions    Ace Inhibitors Cough    Pcn [Penicillins] Rash     OBJECTIVE    Visit Vitals  /80   Pulse 60   Temp 97.8 °F (36.6 °C) (Temporal)   Resp 16   Ht 5' 3\" (1.6 m)   Wt 164 lb (74.4 kg)   SpO2 98%   BMI 29.05 kg/m²       Physical Exam  Vitals and nursing note reviewed. Constitutional:       General: She is not in acute distress. Appearance: She is not diaphoretic. Eyes:      Conjunctiva/sclera: Conjunctivae normal.      Pupils: Pupils are equal, round, and reactive to light. Cardiovascular:      Rate and Rhythm: Normal rate and regular rhythm. Heart sounds: Normal heart sounds. No murmur heard. No friction rub. No gallop. Pulmonary:      Effort: Pulmonary effort is normal. No respiratory distress. Breath sounds: Normal breath sounds. No wheezing. Skin:     General: Skin is warm and dry. Neurological:      Mental Status: She is alert. ASSESSMENT AND PLAN  Sada Diaz is a 80 y.o. female who presents today for:    1. Encounter for Medicare annual wellness exam    2. Rosacea  Stable, continue current treatment. - metroNIDAZOLE (METROGEL) 1 % topical gel; Apply thin film to affected area once daily  Dispense: 60 g; Refill: 2    3. Chronic hepatitis C without hepatic coma (HCC)  Pt declines to see hepatology. Daughter is okay with lab surveillance. Consider repeat US. - AMMONIA; Future  - AFP, TUMOR MARKER; Future  - AFP, TUMOR MARKER  - AMMONIA    4. Iron deficiency anemia, unspecified iron deficiency anemia type  Stable, continue current treatment pending review of labs. - FERRITIN; Future  - IRON PROFILE; Future  - CBC WITH AUTOMATED DIFF; Future  - CBC WITH AUTOMATED DIFF  - IRON PROFILE  - FERRITIN    5. Chronic kidney disease, stage IV (severe) (HCC)  Stable, continue current treatment pending review of labs. - METABOLIC PANEL, COMPREHENSIVE; Future  - URIC ACID; Future  - URIC ACID  - METABOLIC PANEL, COMPREHENSIVE    6. Vegetarian  - VITAMIN B12 & FOLATE; Future  - VITAMIN B12 & FOLATE    7. Weight loss  Consider supplementing with Boost.  - TSH 3RD GENERATION;  Future  - T4 (THYROXINE); Future  - T4 (THYROXINE)  - TSH 3RD GENERATION    8. Chronic gout without tophus, unspecified cause, unspecified site  - URIC ACID; Future  - URIC ACID       Medications Discontinued During This Encounter   Medication Reason    metroNIDAZOLE (METROGEL) 1 % topical gel REORDER    allopurinoL (ZYLOPRIM) 100 mg tablet LIST CLEANUP    losartan (COZAAR) 50 mg tablet LIST CLEANUP    omeprazole (PRILOSEC) 40 mg capsule LIST CLEANUP           Treatment risks/benefits/costs/interactions/alternatives discussed with patient. Advised patient to call back or return to office if symptoms worsen/change/persist. If patient cannot reach us or should anything more severe/urgent arise he/she should proceed directly to the nearest emergency department. Discussed expected course/resolution/complications of diagnosis in detail with patient. Patient expressed understanding with the diagnosis and plan. This dictation may have been completed with Dragon, the computer voice recognition software. Unanticipated grammatical, syntax, homophones, and other interpretive errors are sometimes inadvertently transcribed by the computer software. Please disregard any errors that have escaped final proofreading. Cherri Dwyer M.D.

## 2022-08-25 NOTE — PROGRESS NOTES
This is the Subsequent Medicare Annual Wellness Exam, performed 12 months or more after the Initial AWV or the last Subsequent AWV    I have reviewed the patient's medical history in detail and updated the computerized patient record. Assessment/Plan   Education and counseling provided:  Are appropriate based on today's review and evaluation    1. Encounter for Medicare annual wellness exam  2. Rosacea  -     metroNIDAZOLE (METROGEL) 1 % topical gel; Apply thin film to affected area once daily, Normal, Disp-60 g, R-2  3. Chronic hepatitis C without hepatic coma (HCC)  -     AMMONIA; Future  -     AFP, TUMOR MARKER; Future  4. Iron deficiency anemia, unspecified iron deficiency anemia type  -     FERRITIN; Future  -     IRON PROFILE; Future  -     CBC WITH AUTOMATED DIFF; Future  5. Chronic kidney disease, stage IV (severe) (HCC)  -     METABOLIC PANEL, COMPREHENSIVE; Future  -     URIC ACID; Future  6. Vegetarian  -     VITAMIN B12 & FOLATE; Future  7. Weight loss  -     TSH 3RD GENERATION; Future  -     T4 (THYROXINE); Future  8. Chronic gout without tophus, unspecified cause, unspecified site  -     URIC ACID; Future       Depression Risk Factor Screening     3 most recent PHQ Screens 8/25/2022   Little interest or pleasure in doing things Not at all   Feeling down, depressed, irritable, or hopeless Not at all   Total Score PHQ 2 0       Alcohol & Drug Abuse Risk Screen    Do you average more than 1 drink per night or more than 7 drinks a week:  No    On any one occasion in the past three months have you have had more than 3 drinks containing alcohol:  No          Functional Ability and Level of Safety    Hearing: Hearing is good. Activities of Daily Living:   The home contains: handrails and grab bars  ADL Assessment 8/25/2022   Feeding yourself No Help Needed   Getting from bed to chair No Help Needed   Getting dressed No Help Needed   Bathing or showering Help Needed   Walk across the room (includes cane/walker) Help Needed   Using the telphone No Help Needed   Taking your medications No Help Needed   Preparing meals Help Needed   Managing money (expenses/bills) Help Needed   Moderately strenuous housework (laundry) Help Needed   Shopping for personal items (toiletries/medicines) Help Needed   Shopping for groceries Help Needed   Driving Help Needed   Climbing a flight of stairs Help Needed   Getting to places beyond walking distances Help Needed         Ambulation: with difficulty, uses a walker     Fall Risk:  Fall Risk Assessment, last 12 mths 8/25/2022   Able to walk? Yes   Fall in past 12 months? 1   Do you feel unsteady? 0   Are you worried about falling 0   Is TUG test greater than 12 seconds? 0   Is the gait abnormal? 0   Number of falls in past 12 months 1   Fall with injury?  1      Abuse Screen:  Patient is not abused       Cognitive Screening    Has your family/caregiver stated any concerns about your memory: no       Health Maintenance Due     Health Maintenance Due   Topic Date Due    Shingrix Vaccine Age 49> (1 of 2) Never done    DTaP/Tdap/Td series (1 - Tdap) 09/10/2017    Pneumococcal 65+ years (2 - PPSV23 or PCV20) 03/16/2018    COVID-19 Vaccine (4 - Booster for Pfizer series) 06/23/2022       Patient Care Team   Patient Care Team:  Kylah Wakefield MD as PCP - General (Family Medicine)  Kylah Wakefield MD as PCP - [facility] Empaneled Provider  Jennifer Contreras MD as Physician (Internal Medicine Physician)  Machelle Limon MD (Nephrology)  Shelley Pastrana MD (Cardiovascular Disease Physician)  Sohan Perry MD (Orthopedic Surgery)    History     Patient Active Problem List   Diagnosis Code    HTN, goal below 150/90 I10    Gout M10.9    Chronic hepatitis C without hepatic coma ([address] B18.2    Osteoarthritis M19.90    Chronic kidney disease, stage IV (severe) ([address] N18.4     Past Medical History:   Diagnosis Date    Chronic hepatitis C without hepatic coma ([address] 02/01/2018    From blood transfusion, no history of treatment. Duodenal ulcer     Gout     HTN, goal below 150/90 2/1/2018    Osteoarthritis     both knees      Past Surgical History:   Procedure Laterality Date    HX CATARACT REMOVAL Bilateral 2008    HX HYSTERECTOMY       Current Outpatient Medications   Medication Sig Dispense Refill    metroNIDAZOLE (METROGEL) 1 % topical gel Apply thin film to affected area once daily 60 g 2    allopurinoL (ZYLOPRIM) 100 mg tablet Take 200 mg by mouth daily. losartan (COZAAR) 50 mg tablet Take 50 mg by mouth daily. omeprazole (PRILOSEC) 20 mg capsule Take 20 mg by mouth daily. calcium carbonate/vitamin D3 (OS-GEOVANI 500 + D PO) Take  by mouth. acetaminophen (TYLENOL) 500 mg tablet Take 500 mg by mouth every six (6) hours as needed for Pain. senna-docusate (Senokot-S) 8.6-50 mg per tablet Take 1 Tablet by mouth daily. 90 Tablet 1    FeroSuL 325 mg (65 mg iron) tablet Take 1 Tablet by mouth daily. 90 Tablet 1    metoprolol succinate (TOPROL-XL) 50 mg XL tablet TAKE 1 TABLET EVERY DAY 90 Tablet 3    furosemide (LASIX) 20 mg tablet Take 1 Tab by mouth daily.  (Patient taking differently: Take 60 mg by mouth daily.) 90 Tab 3    nitroglycerin (NITROSTAT) 0.4 mg SL tablet Take 1 tablet SL at first sign of attack; repeat every 5 minutes if needed for a total of 3 tablets in 15 minutes; if no relief, call 911 30 Tab 0     Allergies   Allergen Reactions    Ace Inhibitors Cough    Pcn [Penicillins] Rash       Family History   Problem Relation Age of Onset    No Known Problems Mother     No Known Problems Father      Social History     Tobacco Use    Smoking status: Never    Smokeless tobacco: Never   Substance Use Topics    Alcohol use: No         Tenzin Chacon MD

## 2022-08-25 NOTE — PROGRESS NOTES
Chief Complaint   Patient presents with    Hypertension    Annual Wellness Visit       1. Have you been to the ER, urgent care clinic since your last visit? Hospitalized since your last visit? No    2. Have you seen or consulted any other health care providers outside of the [address] [place] [address] [place] since your last visit? Include any pap smears or colon screening. No    3. For patients over 45: Has the patient had a colonoscopy? Yes - no Care Gap present     If the patient is female:    4. For patients over 40: Has the patient had a mammogram? Yes - no Care Gap present    5. For patients over 21: Has the patient had a pap smear? Yes - no Care Gap present    3 most recent PHQ Screens 8/25/2022   Little interest or pleasure in doing things Not at all   Feeling down, depressed, irritable, or hopeless Not at all   Total Score PHQ 2 0       Fall Risk Assessment, last 12 mths 8/25/2022   Able to walk? Yes   Fall in past 12 months? 1   Do you feel unsteady? 0   Are you worried about falling 0   Is TUG test greater than 12 seconds? 0   Is the gait abnormal? 0   Number of falls in past 12 months 1   Fall with injury? 1       ADL Assessment 8/25/2022   Feeding yourself No Help Needed   Getting from bed to chair No Help Needed   Getting dressed No Help Needed   Bathing or showering Help Needed   Walk across the room (includes cane/walker) Help Needed   Using the telphone No Help Needed   Taking your medications No Help Needed   Preparing meals Help Needed   Managing money (expenses/bills) Help Needed   Moderately strenuous housework (laundry) Help Needed   Shopping for personal items (toiletries/medicines) Help Needed   Shopping for groceries Help Needed   Driving Help Needed   Climbing a flight of stairs Help Needed   Getting to places beyond walking distances Help Needed       Abuse Screening Questionnaire 8/25/2022   Do you ever feel afraid of your partner?  N   Are you in a relationship with someone who physically or mentally threatens you? N   Is it safe for you to go home?  Nyla Ramon

## 2022-08-26 LAB
ALBUMIN SERPL-MCNC: 3.8 G/DL (ref 3.5–5)
ALBUMIN/GLOB SERPL: 1.3 {RATIO} (ref 1.1–2.2)
ALP SERPL-CCNC: 75 U/L (ref 45–117)
ALT SERPL-CCNC: 33 U/L (ref 12–78)
AMMONIA PLAS-SCNC: 27 UMOL/L
ANION GAP SERPL CALC-SCNC: 9 MMOL/L (ref 5–15)
AST SERPL-CCNC: 36 U/L (ref 15–37)
BASOPHILS # BLD: 0 K/UL (ref 0–0.1)
BASOPHILS NFR BLD: 0 % (ref 0–1)
BILIRUB SERPL-MCNC: 0.7 MG/DL (ref 0.2–1)
BUN SERPL-MCNC: 60 MG/DL (ref 6–20)
BUN/CREAT SERPL: 32 (ref 12–20)
CALCIUM SERPL-MCNC: 9.7 MG/DL (ref 8.5–10.1)
CHLORIDE SERPL-SCNC: 107 MMOL/L (ref 97–108)
CO2 SERPL-SCNC: 24 MMOL/L (ref 21–32)
CREAT SERPL-MCNC: 1.87 MG/DL (ref 0.55–1.02)
DIFFERENTIAL METHOD BLD: ABNORMAL
EOSINOPHIL # BLD: 0.1 K/UL (ref 0–0.4)
EOSINOPHIL NFR BLD: 2 % (ref 0–7)
ERYTHROCYTE [DISTWIDTH] IN BLOOD BY AUTOMATED COUNT: 16.4 % (ref 11.5–14.5)
FERRITIN SERPL-MCNC: 130 NG/ML (ref 8–252)
FOLATE SERPL-MCNC: 13 NG/ML (ref 5–21)
GLOBULIN SER CALC-MCNC: 3 G/DL (ref 2–4)
GLUCOSE SERPL-MCNC: 105 MG/DL (ref 65–100)
HCT VFR BLD AUTO: 35.2 % (ref 35–47)
HGB BLD-MCNC: 11.4 G/DL (ref 11.5–16)
IMM GRANULOCYTES # BLD AUTO: 0 K/UL (ref 0–0.04)
IMM GRANULOCYTES NFR BLD AUTO: 0 % (ref 0–0.5)
IRON SATN MFR SERPL: 25 % (ref 20–50)
IRON SERPL-MCNC: 86 UG/DL (ref 35–150)
LYMPHOCYTES # BLD: 1.8 K/UL (ref 0.8–3.5)
LYMPHOCYTES NFR BLD: 24 % (ref 12–49)
MCH RBC QN AUTO: 31.1 PG (ref 26–34)
MCHC RBC AUTO-ENTMCNC: 32.4 G/DL (ref 30–36.5)
MCV RBC AUTO: 95.9 FL (ref 80–99)
MONOCYTES # BLD: 0.6 K/UL (ref 0–1)
MONOCYTES NFR BLD: 7 % (ref 5–13)
NEUTS SEG # BLD: 4.9 K/UL (ref 1.8–8)
NEUTS SEG NFR BLD: 67 % (ref 32–75)
NRBC # BLD: 0 K/UL (ref 0–0.01)
NRBC BLD-RTO: 0 PER 100 WBC
PLATELET # BLD AUTO: 244 K/UL (ref 150–400)
PMV BLD AUTO: 12 FL (ref 8.9–12.9)
POTASSIUM SERPL-SCNC: 3.8 MMOL/L (ref 3.5–5.1)
PROT SERPL-MCNC: 6.8 G/DL (ref 6.4–8.2)
RBC # BLD AUTO: 3.67 M/UL (ref 3.8–5.2)
SODIUM SERPL-SCNC: 140 MMOL/L (ref 136–145)
T4 SERPL-MCNC: 14 UG/DL (ref 4.8–13.9)
TIBC SERPL-MCNC: 349 UG/DL (ref 250–450)
TSH SERPL DL<=0.05 MIU/L-ACNC: 1.55 UIU/ML (ref 0.36–3.74)
URATE SERPL-MCNC: 5.2 MG/DL (ref 2.6–6)
VIT B12 SERPL-MCNC: 1107 PG/ML (ref 193–986)
WBC # BLD AUTO: 7.4 K/UL (ref 3.6–11)

## 2022-08-27 LAB — AFP-TM SERPL-MCNC: 14.6 NG/ML (ref 0–8.7)

## 2022-10-17 ENCOUNTER — VIRTUAL VISIT (OUTPATIENT)
Dept: FAMILY MEDICINE CLINIC | Age: 87
End: 2022-10-17

## 2022-10-17 DIAGNOSIS — Z91.199 NO-SHOW FOR APPOINTMENT: Primary | ICD-10-CM

## 2022-10-17 RX ORDER — DOXYCYCLINE 100 MG/1
100 TABLET ORAL 2 TIMES DAILY
Qty: 14 TABLET | Refills: 0 | Status: CANCELLED | OUTPATIENT
Start: 2022-10-17 | End: 2022-10-24

## 2022-10-17 NOTE — PROGRESS NOTES
Patient did not log onto do[employer]. me nor [employer] links sent several times. [facility] office attempted to call patient without answer. Should be seen in office within 24 hours for evaluation or directed to urgent care/ED if having increased respiratory rate, fever, confusion.

## 2022-10-18 ENCOUNTER — OFFICE VISIT (OUTPATIENT)
Dept: FAMILY MEDICINE CLINIC | Age: 87
End: 2022-10-18
Payer: MEDICARE

## 2022-10-18 VITALS
HEART RATE: 59 BPM | TEMPERATURE: 97.2 F | WEIGHT: 171 LBS | RESPIRATION RATE: 18 BRPM | HEIGHT: 63 IN | OXYGEN SATURATION: 98 % | DIASTOLIC BLOOD PRESSURE: 60 MMHG | BODY MASS INDEX: 30.3 KG/M2 | SYSTOLIC BLOOD PRESSURE: 100 MMHG

## 2022-10-18 DIAGNOSIS — I50.9 CONGESTIVE HEART FAILURE, UNSPECIFIED HF CHRONICITY, UNSPECIFIED HEART FAILURE TYPE (HCC): ICD-10-CM

## 2022-10-18 DIAGNOSIS — R05.2 SUBACUTE COUGH: Primary | ICD-10-CM

## 2022-10-18 DIAGNOSIS — B18.2 CHRONIC HEPATITIS C WITHOUT HEPATIC COMA (HCC): ICD-10-CM

## 2022-10-18 DIAGNOSIS — R77.2 ELEVATED AFP: ICD-10-CM

## 2022-10-18 PROCEDURE — 1090F PRES/ABSN URINE INCON ASSESS: CPT | Performed by: STUDENT IN AN ORGANIZED HEALTH CARE EDUCATION/TRAINING PROGRAM

## 2022-10-18 PROCEDURE — 1123F ACP DISCUSS/DSCN MKR DOCD: CPT | Performed by: STUDENT IN AN ORGANIZED HEALTH CARE EDUCATION/TRAINING PROGRAM

## 2022-10-18 PROCEDURE — 99214 OFFICE O/P EST MOD 30 MIN: CPT | Performed by: STUDENT IN AN ORGANIZED HEALTH CARE EDUCATION/TRAINING PROGRAM

## 2022-10-18 PROCEDURE — G8510 SCR DEP NEG, NO PLAN REQD: HCPCS | Performed by: STUDENT IN AN ORGANIZED HEALTH CARE EDUCATION/TRAINING PROGRAM

## 2022-10-18 PROCEDURE — 1101F PT FALLS ASSESS-DOCD LE1/YR: CPT | Performed by: STUDENT IN AN ORGANIZED HEALTH CARE EDUCATION/TRAINING PROGRAM

## 2022-10-18 PROCEDURE — G8536 NO DOC ELDER MAL SCRN: HCPCS | Performed by: STUDENT IN AN ORGANIZED HEALTH CARE EDUCATION/TRAINING PROGRAM

## 2022-10-18 PROCEDURE — G8427 DOCREV CUR MEDS BY ELIG CLIN: HCPCS | Performed by: STUDENT IN AN ORGANIZED HEALTH CARE EDUCATION/TRAINING PROGRAM

## 2022-10-18 PROCEDURE — G8417 CALC BMI ABV UP PARAM F/U: HCPCS | Performed by: STUDENT IN AN ORGANIZED HEALTH CARE EDUCATION/TRAINING PROGRAM

## 2022-10-18 PROCEDURE — G0463 HOSPITAL OUTPT CLINIC VISIT: HCPCS | Performed by: STUDENT IN AN ORGANIZED HEALTH CARE EDUCATION/TRAINING PROGRAM

## 2022-10-18 RX ORDER — DOXYCYCLINE 100 MG/1
100 TABLET ORAL 2 TIMES DAILY
Qty: 14 TABLET | Refills: 0 | Status: SHIPPED | OUTPATIENT
Start: 2022-10-18 | End: 2022-10-25

## 2022-10-18 RX ORDER — BENZONATATE 100 MG/1
100 CAPSULE ORAL
Qty: 30 CAPSULE | Refills: 0 | Status: SHIPPED | OUTPATIENT
Start: 2022-10-18 | End: 2022-10-28

## 2022-10-18 NOTE — PROGRESS NOTES
Chief Complaint   Patient presents with    Cough     Apox 11 days with Fatigue         1. \"Have you been to the ER, urgent care clinic since your last visit? Hospitalized since your last visit? \" No    2. \"Have you seen or consulted any other health care providers outside of the [address] [place] [address] since your last visit? \" No     3. For patients aged 39-70: Has the patient had a colonoscopy / FIT/ Cologuard? NA - based on age      If the patient is female:    4. For patients aged 41-77: Has the patient had a mammogram within the past 2 years? NA - based on age or sex      11. For patients aged 21-65: Has the patient had a pap smear?  NA - based on age or sex         3 most recent PHQ Screens 10/18/2022   Little interest or pleasure in doing things Not at all   Feeling down, depressed, irritable, or hopeless Not at all   Total Score PHQ 2 0       Health Maintenance Due   Topic Date Due    Hepatitis B Vaccine (1 of 3 - Risk 3-dose series) Never done    Shingrix Vaccine Age 50> (1 of 2) Never done    DTaP/Tdap/Td series (1 - Tdap) 09/10/2017    Pneumococcal 65+ years (2 - PPSV23 or PCV20) 03/16/2018    COVID-19 Vaccine (4 - Booster for Pfizer series) 06/23/2022    Flu Vaccine (1) 08/01/2022

## 2022-10-18 NOTE — PATIENT INSTRUCTIONS
Please call cardiology for followup of her CHF      Randy Barrios (hepatology)  [address] [place]  [address] [facility] [address]  Phone: [phone]

## 2022-10-18 NOTE — PROGRESS NOTES
Ann Martin Prokopovych  80 y.o. female  10/5/1929  [place] [address]  [id number]     [facility]       Chief Complaint: cough with fatigue  Source: self and daughter who accompanies her on visit today    Diego Brown is an 80 y.o. female who presents for 11 days of cough with fatigue. The cough is very dry and persistent, worse at night. Initially with nasal congestion and runny nose which is still occurring intermittently but the cough is now the most bothersome. Daughter mostly concerned about the cough and fatigue - needing to sit up in the chair to get better breathing so she can sleep overnight. She does have CHF and follows with cardiology - followed by Dr Giulia Malone but last seen ~ 7 months ago following admission for CHF per the daughter (no records in our system) Does not take regular weights at home. Mildly increased LE swelling per daughter. Takes lasix 20mg BID plu additional 20mg dose prn which she has not been taking this week. Taking mucinex for the cough, no antihistamine, using saline nasal spray. Allergies - reviewed: Allergies   Allergen Reactions    Ace Inhibitors Cough    Pcn [Penicillins] Rash         Medications - reviewed:   Current Outpatient Medications   Medication Sig    doxycycline (ADOXA) 100 mg tablet Take 1 Tablet by mouth two (2) times a day for 7 days. benzonatate (TESSALON) 100 mg capsule Take 1 Capsule by mouth three (3) times daily as needed for Cough for up to 10 days. metroNIDAZOLE (METROGEL) 1 % topical gel Apply thin film to affected area once daily    allopurinoL (ZYLOPRIM) 100 mg tablet Take 200 mg by mouth daily. losartan (COZAAR) 50 mg tablet Take 50 mg by mouth daily. omeprazole (PRILOSEC) 20 mg capsule Take 20 mg by mouth daily. calcium carbonate/vitamin D3 (OS-GEOVANI 500 + D PO) Take  by mouth.     acetaminophen (TYLENOL) 500 mg tablet Take 500 mg by mouth every six (6) hours as needed for Pain.    senna-docusate (Senokot-S) 8.6-50 mg per tablet Take 1 Tablet by mouth daily. FeroSuL 325 mg (65 mg iron) tablet Take 1 Tablet by mouth daily. metoprolol succinate (TOPROL-XL) 50 mg XL tablet TAKE 1 TABLET EVERY DAY    furosemide (LASIX) 20 mg tablet Take 1 Tab by mouth daily. (Patient taking differently: Take 60 mg by mouth daily.)    nitroglycerin (NITROSTAT) 0.4 mg SL tablet Take 1 tablet SL at first sign of attack; repeat every 5 minutes if needed for a total of 3 tablets in 15 minutes; if no relief, call 911     No current facility-administered medications for this visit. Past Medical History - reviewed:  Past Medical History:   Diagnosis Date    Chronic hepatitis C without hepatic coma ([facility] [address] 02/01/2018    From blood transfusion, no history of treatment.     Duodenal ulcer     Gout     HTN, goal below 150/90 2/1/2018    Osteoarthritis     both knees         Past Surgical History - reviewed:   Past Surgical History:   Procedure Laterality Date    HX CATARACT REMOVAL Bilateral 2008    HX HYSTERECTOMY           Social History - reviewed:  Social History     Socioeconomic History    Marital status:      Spouse name: Not on file    Number of children: Not on file    Years of education: Not on file    Highest education level: Not on file   Occupational History    Not on file   Tobacco Use    Smoking status: Never     Passive exposure: Never    Smokeless tobacco: Never   Vaping Use    Vaping Use: Never used   Substance and Sexual Activity    Alcohol use: No    Drug use: No    Sexual activity: Not Currently     Partners: Male   Other Topics Concern    Not on file   Social History Narrative    Not on file     Social Determinants of Health     Financial Resource Strain: Low Risk     Difficulty of Paying Living Expenses: Not hard at all   Food Insecurity: No Food Insecurity    Worried About [facility] in the Last Year: Never true    [facility] in the Last Year: Never true Transportation Needs: Not on file   Physical Activity: Not on file   Stress: Not on file   Social Connections: Not on file   Intimate Partner Violence: Not on file   Housing Stability: Not on file         Family History - reviewed:  Family History   Problem Relation Age of Onset    No Known Problems Mother     No Known Problems Father          Immunizations - reviewed:   Immunization History   Administered Date(s) Administered    COVID-19, PFIZER PURPLE top, DILUTE for use, (age 15 y+), IM, 30mcg/0.3mL 06/18/2021, 07/16/2021, 02/23/2022    Influenza High Dose Vaccine PF 02/01/2018, 08/19/2019, 01/12/2022    Influenza Vaccine 10/23/2015, 11/08/2018    Influenza Vaccine (Tri) Adjuvanted (>65 Yrs FLUAD TRI 71333) 11/08/2018    Pneumococcal Conjugate (PCV-13) 03/16/2017    Td 09/09/2017    Tdap 09/09/2017     Flu: ill today      Review of Systems   Constitutional:  Positive for malaise/fatigue. Negative for chills and fever. HENT:  Positive for congestion. Respiratory:  Positive for cough and shortness of breath. Negative for sputum production and wheezing. Cardiovascular:  Negative for chest pain and palpitations. Musculoskeletal:  Negative for myalgias. Neurological:  Negative for dizziness and headaches. Physical Exam  Visit Vitals  /60 (BP 1 Location: Left upper arm, BP Patient Position: Sitting, BP Cuff Size: Large adult)   Pulse (!) 59   Temp 97.2 °F (36.2 °C) (Temporal)   Resp 18   Ht 5' 3\" (1.6 m)   Wt 171 lb (77.6 kg)   SpO2 98%   BMI 30.29 kg/m²     Wt Readings from Last 3 Encounters:   10/18/22 171 lb (77.6 kg)   08/25/22 164 lb (74.4 kg)   01/12/22 171 lb (77.6 kg)       Physical Exam  Vitals and nursing note reviewed. Constitutional:       General: She is not in acute distress. Appearance: Normal appearance. She is obese. She is not ill-appearing, toxic-appearing or diaphoretic. Cardiovascular:      Rate and Rhythm: Normal rate and regular rhythm.       Pulses: Normal pulses. Heart sounds: S1 normal and S2 normal. Murmur heard. Systolic murmur is present with a grade of 2/6. Comments: Early blowing, systolic murmur present over the right 2nd intercostal space  Pulmonary:      Effort: Pulmonary effort is normal. No respiratory distress. Breath sounds: Normal breath sounds. No stridor. No wheezing, rhonchi or rales. Musculoskeletal:      Right lower le+ Pitting Edema present. Left lower le+ Edema present. Neurological:      Mental Status: She is alert. Assessment/Plan    ICD-10-CM ICD-9-CM    1. Subacute cough  R05.2 786.2 doxycycline (ADOXA) 100 mg tablet      XR CHEST PA LAT      benzonatate (TESSALON) 100 mg capsule      2. Chronic hepatitis C without hepatic coma (HCC)  B18.2 070.54       3. Elevated AFP  R77.2 790.99       4. Congestive heart failure, unspecified HF chronicity, unspecified heart failure type ([facility]  I50.9 428.0           Brooke Hanson is an 80 y.o. female with hx of CHF, chronic Hep C, HTN here today with 1.5 weeks of cough with fatigue that begin with viral URI symptoms. Clear lung exam and 1+ bilateral edema without ronchi on CV exam, suspect may be postviral PNA vs CHF exacerbation however no baseline proBNP on file and last ECHO 218 as patient seen by outside cardiologist. Strongly encouraged to have routine followup with her cardiologist for repeat ECHO and medication adjustment and will treat today for suspect CAP. Discussed the importance of routine followup with cardiology with the patient and her daughter who accompanies her today. Also reiterated recommendations from p previous physicians to see hepatology for chric hep C with new elevation in AFP concerning for hepatocellular carcinoma; provided office contact information for Dr Dhaval Poon in [place].    1. Subacute cough  - doxycycline (ADOXA) 100 mg tablet; Take 1 Tablet by mouth two (2) times a day for 7 days. Dispense: 14 Tablet;  Refill: 0  - XR CHEST PA LAT; Future  - benzonatate (TESSALON) 100 mg capsule; Take 1 Capsule by mouth three (3) times daily as needed for Cough for up to 10 days. Dispense: 30 Capsule; Refill: 0    2. Chronic hepatitis C without hepatic coma ([facility] [address]  - referral to hepatology on file, strongly encouraged to establish care    3. Elevated AFP  - referral to hepatology on file, strongly encouraged to establish care    4. Congestive heart failure, unspecified HF chronicity, unspecified heart failure type ([facility]  - recommended soon followup with Dr Tani Salmeron, cardiology to maximize CHF    Patient informed to follow up: Follow-up and Dispositions    Return in about 10 months (around 8/23/2023) for annual medicare wellness with Dr Gregory Mortimer. I have discussed the diagnosis with the patient and the intended plan as seen in the above orders. Patient verbalized understanding of the plan and agrees with the plan. The patient has received an after-visit summary and questions were answered concerning future plans. I have discussed medication side effects and warnings with the patient as well. Informed patient to return to the office if new symptoms arise.       Mich Oviedo MD  [facility]

## 2023-01-27 ENCOUNTER — NURSE TRIAGE (OUTPATIENT)
Dept: OTHER | Facility: CLINIC | Age: 88
End: 2023-01-27

## 2023-01-30 ENCOUNTER — VIRTUAL VISIT (OUTPATIENT)
Dept: FAMILY MEDICINE CLINIC | Age: 88
End: 2023-01-30
Payer: MEDICARE

## 2023-01-30 DIAGNOSIS — B18.2 CHRONIC HEPATITIS C WITHOUT HEPATIC COMA (HCC): ICD-10-CM

## 2023-01-30 DIAGNOSIS — R39.81 FUNCTIONAL INCONTINENCE: ICD-10-CM

## 2023-01-30 DIAGNOSIS — M25.552 BILATERAL HIP PAIN: ICD-10-CM
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
## 2023-01-30 DIAGNOSIS — M54.50 ACUTE MIDLINE LOW BACK PAIN, UNSPECIFIED WHETHER SCIATICA PRESENT: ICD-10-CM

## 2023-01-30 DIAGNOSIS — I50.9 CONGESTIVE HEART FAILURE, UNSPECIFIED HF CHRONICITY, UNSPECIFIED HEART FAILURE TYPE (HCC): ICD-10-CM

## 2023-01-30 DIAGNOSIS — M25.551 BILATERAL HIP PAIN: ICD-10-CM

## 2023-01-30 DIAGNOSIS — N18.4 CHRONIC KIDNEY DISEASE, STAGE IV (SEVERE) (HCC): ICD-10-CM

## 2023-01-30 PROCEDURE — G0463 HOSPITAL OUTPT CLINIC VISIT: HCPCS | Performed by: FAMILY MEDICINE

## 2023-01-30 PROCEDURE — 99213 OFFICE O/P EST LOW 20 MIN: CPT | Performed by: FAMILY MEDICINE

## 2023-01-30 PROCEDURE — G8432 DEP SCR NOT DOC, RNG: HCPCS | Performed by: FAMILY MEDICINE

## 2023-01-30 PROCEDURE — 1123F ACP DISCUSS/DSCN MKR DOCD: CPT | Performed by: FAMILY MEDICINE

## 2023-01-30 PROCEDURE — G8427 DOCREV CUR MEDS BY ELIG CLIN: HCPCS | Performed by: FAMILY MEDICINE

## 2023-01-30 PROCEDURE — 1101F PT FALLS ASSESS-DOCD LE1/YR: CPT | Performed by: FAMILY MEDICINE

## 2023-01-30 PROCEDURE — 1090F PRES/ABSN URINE INCON ASSESS: CPT | Performed by: FAMILY MEDICINE

## 2023-01-30 RX ORDER — LIDOCAINE 50 MG/G
PATCH TOPICAL
Qty: 30 EACH | Refills: 0 | Status: SHIPPED | OUTPATIENT
Start: 2023-01-30

## 2023-01-30 NOTE — PROGRESS NOTES
Brooke Searsseanmihir, was evaluated through a synchronous (real-time) audio-video encounter. The patient (or guardian if applicable) is aware that this is a billable service, which includes applicable co-pays. This Virtual Visit was conducted with patient's (and/or legal guardian's) consent. The visit was conducted pursuant to the emergency declaration under the [facility], [address] [place] [address] and the [employer] Act. Patient identification was verified, and a caregiver was present when appropriate. The patient was located at: Home: [address] [place] [id number]  The provider was located at: Facility (Appt Department): [facility]       Simin Roldan MD    Subjective:   Adan Herman is a 80 y.o. F who was seen for:    Daughter is present and interpreting. States that one week ago, patient was on her way to the bathroom when she fell down to the floor. Daughter did not witness the fall but did hear her when she fell and immediately attended to her. Denies any head injury or loss of consciousness. For the first few days, she was able to walk. But now the pain has become much worse to the point where she cannot leave her house due to unable to ambulate to the car. Pain is located along her lower spine, tailbone area, and below both of her buttocks. She also has pain in both of her hips. 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 Taking Tylenol and Advil. She does have a history of CKD. She has been taking some oxycodone that was previously prescribed to her after her hip surgery. Daughter is requesting adult diapers as patient is currently unable to ambulate to the bathroom at this time and is using a bedside commode.       Current Outpatient Medications:     metroNIDAZOLE (METROGEL) 1 % topical gel, Apply thin film to affected area once daily, Disp: 60 g, Rfl: 2    allopurinoL (ZYLOPRIM) 100 mg tablet, Take 200 mg by mouth daily. , Disp: , Rfl:     losartan (COZAAR) 50 mg tablet, Take 50 mg by mouth daily. , Disp: , Rfl:     omeprazole (PRILOSEC) 20 mg capsule, Take 20 mg by mouth daily. , Disp: , Rfl:     calcium carbonate/vitamin D3 (OS-GEOVANI 500 + D PO), Take  by mouth., Disp: , Rfl:     acetaminophen (TYLENOL) 500 mg tablet, Take 500 mg by mouth every six (6) hours as needed for Pain., Disp: , Rfl:     senna-docusate (Senokot-S) 8.6-50 mg per tablet, Take 1 Tablet by mouth daily. , Disp: 90 Tablet, Rfl: 1    FeroSuL 325 mg (65 mg iron) tablet, Take 1 Tablet by mouth daily. , Disp: 90 Tablet, Rfl: 1    metoprolol succinate (TOPROL-XL) 50 mg XL tablet, TAKE 1 TABLET EVERY DAY, Disp: 90 Tablet, Rfl: 3    furosemide (LASIX) 20 mg tablet, Take 1 Tab by mouth daily. (Patient taking differently: Take 60 mg by mouth daily.), Disp: 90 Tab, Rfl: 3    nitroglycerin (NITROSTAT) 0.4 mg SL tablet, Take 1 tablet SL at first sign of attack; repeat every 5 minutes if needed for a total of 3 tablets in 15 minutes; if no relief, call 911, Disp: 30 Tab, Rfl: 0    Allergies   Allergen Reactions    Ace Inhibitors Cough    Pcn [Penicillins] Rash       Review of Systems   Constitutional:  Negative for fever, malaise/fatigue and weight loss. Respiratory:  Negative for cough, hemoptysis, shortness of breath and wheezing. Cardiovascular:  Negative for chest pain, palpitations, leg swelling and PND. Gastrointestinal:  Negative for abdominal pain, constipation, diarrhea, nausea and vomiting. Musculoskeletal:  Positive for back pain, falls and joint pain. Neurological:  Negative for dizziness, loss of consciousness and headaches. Objective:   No flowsheet data found.      Constitutional: [x] Appears well-developed and well-nourished [x] No apparent distress      [] Abnormal -     Mental status: [x] Alert and awake  [x] Oriented to person/place/time [x] Able to follow commands [] Abnormal -     Eyes:   EOM    [x]  Normal    [] Abnormal -   Sclera  [x]  Normal    [] Abnormal -          Discharge []  None visible   [] Abnormal -     HENT: [x] Normocephalic, atraumatic  [] Abnormal -   [] Mouth/Throat: Mucous membranes are moist    Neck: [x] No visualized mass [] Abnormal -     Pulmonary/Chest: [x] Respiratory effort normal   [x] No visualized signs of difficulty breathing or respiratory distress        [] Abnormal -         Skin:        [x] No significant exanthematous lesions or discoloration noted on facial skin         [] Abnormal -            Psychiatric:       [x] Normal Affect [] Abnormal -        [x] No Hallucinations    Other pertinent observable physical exam findings:    Assessment & Plan:   Danae  is a 80 y.o. female who presents today for:    1. Fall, initial encounter  Given that pt is unable to ambulate right now, I recommend ER evaluation. Daughter refuses to do this so I have instead recommend [facility] for in person evaluation and imaging. 2. Acute midline low back pain, unspecified whether sciatica present  As above. Daughter did ask me for to prescribe more of her oxycodone; I do not prescribe narcotics and I will defer this medication to the provider who will be assessing her in person. In the meantime, would avoid NSAIDs, continue Tylenol, and can try lidocaine patches. - lidocaine (LIDODERM) 5 %; Apply patch to the affected area for 12 hours a day and remove for 12 hours a day. Dispense: 30 Each; Refill: 0    3. Bilateral hip pain  As above. - lidocaine (LIDODERM) 5 %; Apply patch to the affected area for 12 hours a day and remove for 12 hours a day. Dispense: 30 Each; Refill: 0    4. Functional incontinence  Will send in adult diapers. - brief disposable (adult) misc; by Does Not Apply route. To use as needed (up to 5 times a day); functional incontinence  Dispense: 150 Each; Refill: 0    5.  Congestive heart failure, unspecified HF chronicity, unspecified heart failure type ([facility]  Stable, continue current treatment. 6. Chronic kidney disease, stage IV (severe) (HCC)  Stable, continue current treatment. 7. Chronic hepatitis C without hepatic coma (HCC)  Pt has refused treatment for this in the past. Last liver markers in August were normal.       There are no discontinued medications. Treatment risks/benefits/costs/interactions/alternatives discussed with patient. Advised patient to call back or return to office if symptoms worsen/change/persist. If patient cannot reach us or should anything more severe/urgent arise he/she should proceed directly to the nearest emergency department. Discussed expected course/resolution/complications of diagnosis in detail with patient. Patient expressed understanding with the diagnosis and plan. This dictation may have been completed with Dragon, the [employer] voice recognition software. Unanticipated grammatical, syntax, homophones, and other interpretive errors are sometimes inadvertently transcribed by the computer software. Please disregard any errors that have escaped final proofreading. Cherri Mayen M.D.

## 2023-02-07 ENCOUNTER — NURSE TRIAGE (OUTPATIENT)
Dept: OTHER | Facility: CLINIC | Age: 88
End: 2023-02-07

## 2023-02-07 ENCOUNTER — TELEPHONE (OUTPATIENT)
Dept: FAMILY MEDICINE CLINIC | Age: 88
End: 2023-02-07

## 2023-02-07 NOTE — TELEPHONE ENCOUNTER
Attempted to contact patient or daughter, no answer.  [facility] Refill Routing Note   Medication(s) are not appropriate for processing by [facility] for the following reason(s):      - Patient has been seen in the ED/Hospital since the last PCP visit    [facility] action(s):  Defer          Medication reconciliation completed: No     Appointments  past 12m or future 3m with PCP    Date Provider   Last Visit   10/13/2021 Adela Gifford MD   Next Visit   Visit date not found Adela Gifford MD   ED visits in past 90 days: 1        Note composed:11:59 AM 04/08/2022

## 2023-02-07 NOTE — TELEPHONE ENCOUNTER
If pt is unable to physically move, she needs to go to the ER and possibly be admitted if she cannot ambulate within her home. It sounds like her symptoms have worsen and so a higher level of care is needed. I reviewed the triage nurse's message and agree with her recommendation to go to ER. She may need additional urgent imaging (CT, MRI) done to rule out other causes of her weakness.

## 2023-02-07 NOTE — TELEPHONE ENCOUNTER
Daughter called back and I advised her as per PCP's note. She stated she already took mother to ED and they did CT, results showed no broken bones. I advised she take her back if she is getting worse per PCP. Daughter wanted to know if PCP can order patient Adult X'violeta diapers and bedpads because of patient's movemwnt restrictions now.

## 2023-02-07 NOTE — TELEPHONE ENCOUNTER
Patient's daughter called stated mother had a fall 10 days went to ER no fracture and having pain in her upper legs. Patient is getting very week.      Requesting a call back    Best call back #[phone]

## 2023-02-07 NOTE — TELEPHONE ENCOUNTER
Location of patient: [place]    Daughter Randolph Mcleod on the line, Brooke nearby in other room    Received call from [facility] at [address] with [employer]. Subjective: Caller states \"unable to move, has weakness cannot get up to go to bathroom\"     Current Symptoms: had a fall about a week and half ago, now experiencing pain in upper back and hip area. When this happened called office and was offered a televisit. They recommended lidocaine, and to send dispatch health or go to ER. went to ER on 2/1 xray's showed no broken bones. Prescribed medication to help with pain. Now 6 days into medications and was told to call back if pain isnt improving. Unable to get up to use commode. Tearful when moving. Daughter says that the patient is so weak that she is unable to stand. She cannot move her and needs help. She says that she needs supplies because the patient is now incontinent. Onset: 1 week ago; sudden    Associated Symptoms: NA    Pain Severity: having a lot of pain and unable to move because of it    Temperature: n/a     What has been tried: robaxin, steroids, tylenol    LMP: NA Pregnant: NA    Recommended disposition: Call  Now    Care advice provided, patient verbalizes understanding; denies any other questions or concerns; instructed to call back for any new or worsening symptoms. Patients daughter Randolph Mcleod said that she already took her mother to ER last week and they didn't do anything for her. Writer informed caller than she conveyed that her mothers condition has deteriorated therefore she was informed that requires new and further evaluation. Caller not understanding disposition. Writer provided warm transfer to Guerline WILSON at [facility] for further evaluation    Attention Provider: Thank you for allowing me to participate in the care of your patient. The patient was connected to triage in response to information provided to the [facility].   Please do not respond through this encounter as the response is not directed to a shared pool. Reason for Disposition   SEVERE weakness (i.e., unable to walk or barely able to walk, requires support) and new-onset or worsening    Protocols used:  Falls and Falling-ADULT-[place]

## 2023-02-08 ENCOUNTER — TELEPHONE (OUTPATIENT)
Dept: FAMILY MEDICINE CLINIC | Age: 88
End: 2023-02-08

## 2023-02-08 DIAGNOSIS — R39.81 FUNCTIONAL INCONTINENCE: ICD-10-CM

## 2023-02-08 NOTE — TELEPHONE ENCOUNTER
Patients Granddaughter Kamini Renae (on [employer]) called us to change the pharmacy for the Adult [facility] [address]. Timothy Chin ordered to [facility] on [address] [place] [address], [facility] [address] The original pharmacy at [place] does not take her insurance. Please re-send to [facility] above. Thank you!     SC PAFP 2.8.23

## 2023-02-14 NOTE — TELEPHONE ENCOUNTER
----- Message from Mariah Stokes sent at 2/10/2023  9:22 AM EST -----  Subject: Message to Provider    QUESTIONS  Information for Provider? pt grand daughter called in requesting Adult   Diapers to be sent to [facility] AT [address] [place] [address] 21  cannot   transfer  ---------------------------------------------------------------------------  --------------  [address] [employer]  [phone]; OK to leave message on voicemail  ---------------------------------------------------------------------------  --------------  SCRIPT ANSWERS  Relationship to Patient? Other  Representative Name? Staci Gaston  Is the Representative on the appropriate HIPAA document in Epic?  Yes

## 2023-02-16 ENCOUNTER — TELEPHONE (OUTPATIENT)
Dept: FAMILY MEDICINE CLINIC | Age: 88
End: 2023-02-16

## 2023-02-16 NOTE — TELEPHONE ENCOUNTER
Serenity from [facility] called asking to speak with a nurse regarding a Home Health order.     Best call back number  [phone]

## 2023-02-17 NOTE — TELEPHONE ENCOUNTER
Informed Serenity and informed her Home health should be put on hold because patient is in the hospital.

## 2023-02-17 NOTE — TELEPHONE ENCOUNTER
Pt is currently in the hospital right now; discontinue the current home health orders for now until she is discharged.

## 2023-02-17 NOTE — TELEPHONE ENCOUNTER
Serenity from [address] [place] [address] says you need to change diagnosis on order form to CHF in order for it to be accepted. .    She said earliest patient can be seen after this change is Monday

## 2023-02-23 ENCOUNTER — TELEPHONE (OUTPATIENT)
Dept: FAMILY MEDICINE CLINIC | Age: 88
End: 2023-02-23

## 2023-02-23 NOTE — TELEPHONE ENCOUNTER
Cammy Iqbal from [facility] home [facility] called stating that the patient is being released from the hospital today, and they are requesting home health as well as skilled nursing. They called wondering if we will follow them into home health with PT and OT included.     Best call back number  [phone]

## 2023-02-23 NOTE — TELEPHONE ENCOUNTER
Yes that is fine. Please obtain hospital discharge summary. She also probably needs a LOULOU appt; VV would be fine.

## 2023-03-07 ENCOUNTER — VIRTUAL VISIT (OUTPATIENT)
Dept: FAMILY MEDICINE CLINIC | Age: 88
End: 2023-03-07
Payer: MEDICARE

## 2023-03-07 DIAGNOSIS — R82.90 ABNORMAL URINE ODOR: ICD-10-CM

## 2023-03-07 DIAGNOSIS — Z09 HOSPITAL DISCHARGE FOLLOW-UP: Primary | ICD-10-CM

## 2023-03-07 DIAGNOSIS — D50.9 IRON DEFICIENCY ANEMIA, UNSPECIFIED IRON DEFICIENCY ANEMIA TYPE: ICD-10-CM

## 2023-03-07 DIAGNOSIS — S32.10XS CLOSED FRACTURE OF SACRUM, UNSPECIFIED PORTION OF SACRUM, SEQUELA: ICD-10-CM

## 2023-03-07 PROCEDURE — G8432 DEP SCR NOT DOC, RNG: HCPCS | Performed by: FAMILY MEDICINE

## 2023-03-07 PROCEDURE — 1123F ACP DISCUSS/DSCN MKR DOCD: CPT | Performed by: FAMILY MEDICINE

## 2023-03-07 PROCEDURE — G0463 HOSPITAL OUTPT CLINIC VISIT: HCPCS | Performed by: FAMILY MEDICINE

## 2023-03-07 PROCEDURE — 1101F PT FALLS ASSESS-DOCD LE1/YR: CPT | Performed by: FAMILY MEDICINE

## 2023-03-07 PROCEDURE — 1090F PRES/ABSN URINE INCON ASSESS: CPT | Performed by: FAMILY MEDICINE

## 2023-03-07 PROCEDURE — G8427 DOCREV CUR MEDS BY ELIG CLIN: HCPCS | Performed by: FAMILY MEDICINE

## 2023-03-07 PROCEDURE — 99214 OFFICE O/P EST MOD 30 MIN: CPT | Performed by: FAMILY MEDICINE

## 2023-03-07 RX ORDER — FAMOTIDINE 20 MG/1
TABLET, FILM COATED ORAL
COMMUNITY

## 2023-03-07 RX ORDER — BISACODYL 5 MG
5 TABLET, DELAYED RELEASE (ENTERIC COATED) ORAL DAILY
COMMUNITY
Start: 2023-02-27

## 2023-03-07 RX ORDER — TAMSULOSIN HYDROCHLORIDE 0.4 MG/1
0.4 CAPSULE ORAL DAILY
COMMUNITY

## 2023-03-07 RX ORDER — METHOCARBAMOL 750 MG/1
TABLET, FILM COATED ORAL
COMMUNITY
Start: 2023-02-01

## 2023-03-07 RX ORDER — HYDROGEN PEROXIDE 3 %
SOLUTION, NON-ORAL MISCELLANEOUS DAILY
COMMUNITY

## 2023-03-07 RX ORDER — OXYCODONE HYDROCHLORIDE 5 MG/1
TABLET ORAL
COMMUNITY
Start: 2023-02-27

## 2023-03-07 RX ORDER — PANTOPRAZOLE SODIUM 40 MG/1
40 TABLET, DELAYED RELEASE ORAL
COMMUNITY
Start: 2023-02-27

## 2023-03-07 NOTE — PROGRESS NOTES
Brooke Hanson, was evaluated through a synchronous (real-time) audio-video encounter. The patient (or guardian if applicable) is aware that this is a billable service, which includes applicable co-pays. This Virtual Visit was conducted with patient's (and/or legal guardian's) consent. The visit was conducted pursuant to the emergency declaration under the [facility], [address] [place] [address] and the [employer] and [employer] Act. Patient identification was verified, and a caregiver was present when appropriate. The patient was located at: Home: [address] [place] [id number]  The provider was located at: Facility (Appt Department): [facility]       Suleiman Diaz MD    Subjective:   Maynor Russ is a 80 y.o. F who was seen for:    Daughter is serving as . I do not have hospital records. Patient was admitted to [facility] acute severe iron deficiency anemia, neurogenic bladder, and a sacral fracture status post fall. She underwent a sacroplexy and received several epidural steroid injections. She had a Snider catheter placed due to her neurogenic bladder which was removed yesterday. She did receive 1 unit of packed RBCs due to her anemia which responded well but no identifiable cause of her anemia was assessed. They did a fecal occult test which was reportedly negative. She is overall doing well at home. They are about to start home health physical therapy and OT. Daughter noticed that the urine had some sediment and is wondering if she should be on antibiotics. She otherwise is afebrile and denying any UTI symptoms. She does have an upcoming appointment with her urologist next week. Currently taking Tylenol for pain and has some. Oxycodone from the hospital for severe pain. Previously saw Dr. Maria Ines Schmidt with orthopedics for her hip replacement.     Exams: [id number] MRI [id number]    Reason for Visit: LOW BACK PAIN, ANEMIA  Reason for Exam: back pain  Study: MRI of the lumbosacral plexus without contrast.    Clinical indication: Low back pain. Technique: Multiplanar, multisequence, unenhanced MR imaging of the  lumbosacral plexus was performed. Comparison: Abdominopelvic CT dated 2/16/2023. Findings:    Edema involving the sacrum bilaterally at the level of S1 and S2 with  a focal ventral cortical offset at S2 consistent with insufficiency  fracture. No evidence of a mass or abnormal signal along the expected  course of the lumbosacral plexus. Alignment of the lumbar spine is anatomic. L5 inferior endplate  Schmorl's node/chronic compression deformity which is unchanged from  prior. Intervertebral disc height loss at L1-L5. Small multilevel  anterior osteophytes. Mild Modic type II endplate changes at X0-P6 and  L4-L5. The conus terminates at L1 and is without abnormal signal.     By level:  T12-L1: No spinal canal stenosis or neuroforaminal narrowing. L1-L2: Broad-based disc bulge and bilateral facet hypertrophy causing  partial effacement of the bilateral lateral recesses and mild  bilateral neuroforaminal narrowing. L2-L3: Broad-based disc bulge with a right paracentral/foraminal disc  protrusion and bilateral facet hypertrophy causing partial effacement  of the right lateral recess and moderate right neuroforaminal  narrowing. No significant left neuroforaminal narrowing. L3-L4: Broad-based disc bulge and bilateral facet hypertrophy causing  partial effacement of the bilateral lateral recesses and mild  bilateral neuroforaminal narrowing. L4-L5: Broad-based disc bulge, mild bilateral endplate spurring, and  bilateral facet hypertrophy causing severe spinal canal stenosis and  moderate bilateral neuroforaminal narrowing, left greater than right.     L5-S1: Mild broad-based disc bulge and bilateral facet hypertrophy  causing mild spinal canal stenosis, partial effacement of the left  lateral recess, and mild bilateral neuroforaminal narrowing. Left hip arthroplasty. Partially visualized right renal cyst.      Impression:  1. Bilateral sacral insufficiency fractures. Patient may be a  candidate for sacroplasty. 2. Multilevel degenerative changes of the lumbar spine including  severe spinal canal stenosis and moderate bilateral neuroforaminal  narrowing at L4-L5, as detailed above. Successful CT guided sacroplasty. Exams: [id number] CT ABD PEL WO IV CON      Reason for Visit: BACK PAIN  Reason for Exam: low back pain/ le weakness  Comparison is with unenhanced abdominopelvic CT of 2/1/2023. Technique: Serial axial images were obtained from the lung bases to  the symphysis pubis at 3.75 mm intervals without IV or oral contrast.  Coronal and sagittal reformatted images are reviewed. Lung, bone and  soft tissue windows are evaluated. Comment on Dose Reduction: All CT SCANS at this facility are performed  using dose reduction optimization techniques as appropriate to perform  the exam including the following; automated exposure control,  adjustments of the mA and/or kV according to patient size, or use of  iterative reconstruction technique. Findings: Again noted is significant dense calcification of the mitral  annulus and aortic valve. The heart is enlarged. There is no  pericardial or pleural effusion. There is mild vascular engorgement  the lung bases which can be seen with mild pulmonary edema. The liver with small hypodensity which may represent a cyst, spleen,  pancreas, gallbladder, adrenal glands, and visualized bowel loops  appear unremarkable. There are no renal or ureteral calculi. The  kidneys demonstrate no change in the right lower pole cyst. There is  now bilateral hydronephrosis with mild to moderate left hydroureter of  unclear etiology as no ureteral calculi are identified.  There is  atherosclerosis of the abdominal aorta and iliac vessels. The appendix is not identified as a separate structure but there is no  pericecal inflammation or infiltration. The urinary bladder is distended to the level of the umbilicus. Does  the patient have a neurogenic bladder? No bladder calculi are  identified. The pelvic structures are within normal limits. There is  no diverticulosis or diverticulitis. There is no free air, free fluid  or adenopathy. The osseous structures demonstrate a left total hip arthroplasty which  is intact. Impression: New bilateral hydronephrosis which is worse on the left  than the right. There is also left hydroureter. No obstructing renal  or ureteral calculi are identified. The distal left ureter appears to  enter the bladder somewhat laterally as seen on images 103-110, series  2. Distal to this, metallic artifact from the patient's left hip  obscures portions of the anatomy. No evidence of ureteral or bladder  calculi. Significant distention of the urinary bladder which raises the  possibility of a neurogenic bladder. Cardiomegaly with dense aortic and mitral valve calcifications    No change in hypodensity of the liver. Intact left total hip arthroplasty. Please see full report. Last PDMP Justin Zamarripa as Reviewed:  Review User Review Instant Review Result   VLADISLAV NAIDU 3/7/2023 11:39 AM Reviewed PDMP [1]         Current Outpatient Medications:     bisacodyL (DULCOLAX) 5 mg EC tablet, Take 5 mg by mouth daily. , Disp: , Rfl:     famotidine (PEPCID) 20 mg tablet, famotidine 20 mg tablet, Disp: , Rfl:     methocarbamoL (ROBAXIN) 750 mg tablet, , Disp: , Rfl:     oxyCODONE IR (ROXICODONE) 5 mg immediate release tablet, , Disp: , Rfl:     esomeprazole (NEXIUM) 20 mg capsule, Take  by mouth daily. , Disp: , Rfl:     tamsulosin (Flomax) 0.4 mg capsule, Take 0.4 mg by mouth daily. , Disp: , Rfl:     allopurinoL (ZYLOPRIM) 100 mg tablet, Take 200 mg by mouth daily. , Disp: , Rfl: losartan (COZAAR) 50 mg tablet, Take 50 mg by mouth daily. , Disp: , Rfl:     acetaminophen (TYLENOL) 500 mg tablet, Take 500 mg by mouth every six (6) hours as needed for Pain., Disp: , Rfl:     FeroSuL 325 mg (65 mg iron) tablet, Take 1 Tablet by mouth daily. , Disp: 90 Tablet, Rfl: 1    metoprolol succinate (TOPROL-XL) 50 mg XL tablet, TAKE 1 TABLET EVERY DAY, Disp: 90 Tablet, Rfl: 3    furosemide (LASIX) 20 mg tablet, Take 1 Tab by mouth daily. (Patient taking differently: Take 60 mg by mouth daily. 20 mg BID to TID prn edema), Disp: 90 Tab, Rfl: 3    pantoprazole (PROTONIX) 40 mg tablet, 40 mg. (Patient not taking: Reported on 3/7/2023), Disp: , Rfl:     brief disposable (adult) misc, by Does Not Apply route. To use as needed (up to 5 times a day); functional incontinence, Disp: 150 Each, Rfl: 0    lidocaine (LIDODERM) 5 %, Apply patch to the affected area for 12 hours a day and remove for 12 hours a day., Disp: 30 Each, Rfl: 0    metroNIDAZOLE (METROGEL) 1 % topical gel, Apply thin film to affected area once daily, Disp: 60 g, Rfl: 2    calcium carbonate/vitamin D3 (OS-GEOVANI 500 + D PO), Take  by mouth., Disp: , Rfl:     senna-docusate (Senokot-S) 8.6-50 mg per tablet, Take 1 Tablet by mouth daily. (Patient not taking: Reported on 3/7/2023), Disp: 90 Tablet, Rfl: 1    nitroglycerin (NITROSTAT) 0.4 mg SL tablet, Take 1 tablet SL at first sign of attack; repeat every 5 minutes if needed for a total of 3 tablets in 15 minutes; if no relief, call 911, Disp: 30 Tab, Rfl: 0    Allergies   Allergen Reactions    Ace Inhibitors Cough    Pcn [Penicillins] Rash       Review of Systems   Constitutional:  Negative for fever, malaise/fatigue and weight loss. Respiratory:  Negative for cough, hemoptysis, shortness of breath and wheezing. Cardiovascular:  Negative for chest pain, palpitations, leg swelling and PND. Gastrointestinal:  Negative for abdominal pain, constipation, diarrhea, nausea and vomiting.    Genitourinary: Negative for dysuria, frequency, hematuria and urgency. Musculoskeletal:  Positive for back pain. Objective:   No flowsheet data found. Constitutional: [x] Appears well-developed and well-nourished [x] No apparent distress      [] Abnormal -     Mental status: [x] Alert and awake  [x] Oriented to person/place/time [x] Able to follow commands    [] Abnormal -     Eyes:   EOM    [x]  Normal    [] Abnormal -   Sclera  [x]  Normal    [] Abnormal -          Discharge []  None visible   [] Abnormal -     HENT: [x] Normocephalic, atraumatic  [] Abnormal -   [] Mouth/Throat: Mucous membranes are moist    Neck: [x] No visualized mass [] Abnormal -     Pulmonary/Chest: [x] Respiratory effort normal   [x] No visualized signs of difficulty breathing or respiratory distress        [] Abnormal -         Skin:        [x] No significant exanthematous lesions or discoloration noted on facial skin         [] Abnormal -            Psychiatric:       [x] Normal Affect [] Abnormal -        [x] No Hallucinations    Other pertinent observable physical exam findings:    Assessment & Plan:   Cheri Barry is a 80 y.o. female who presents today for:    1. Hospital discharge follow-up  Clinically stable. 2. Closed fracture of sacrum, unspecified portion of sacrum, sequela  Pt to schedule appt with orthopedics; defer pain management to them. 3. Abnormal urine odor  Pt appears to be asymptomatic. Would recommend urine culture to evaluate if infection is present; hold off on abx.  - CULTURE, URINE; Future    4. Iron deficiency anemia, unspecified iron deficiency anemia type  Unclear why she was so anemic. Recommend repeating labs and monitor. Consider GI and/or hematology evaluation if it is persistent.  - CBC WITH AUTOMATED DIFF;  Future      Medications Discontinued During This Encounter   Medication Reason    omeprazole (PRILOSEC) 20 mg capsule LIST CLEANUP           Treatment risks/benefits/costs/interactions/alternatives discussed with patient. Advised patient to call back or return to office if symptoms worsen/change/persist. If patient cannot reach us or should anything more severe/urgent arise he/she should proceed directly to the nearest emergency department. Discussed expected course/resolution/complications of diagnosis in detail with patient. Patient expressed understanding with the diagnosis and plan. This dictation may have been completed with Dragon, the computer voice recognition software. Unanticipated grammatical, syntax, homophones, and other interpretive errors are sometimes inadvertently transcribed by the computer software. Please disregard any errors that have escaped final proofreading. Cherri Cantu M.D.

## 2023-03-11 LAB
BACTERIA SPEC CULT: ABNORMAL
CC UR VC: ABNORMAL
SERVICE CMNT-IMP: ABNORMAL

## 2023-03-11 RX ORDER — CEFDINIR 300 MG/1
300 CAPSULE ORAL DAILY
Qty: 7 CAPSULE | Refills: 0 | Status: SHIPPED | OUTPATIENT
Start: 2023-03-11 | End: 2023-03-18

## 2023-03-11 NOTE — PROGRESS NOTES
Reviewed results in mychart. CrCl based on [facility] EMERGENCY [facility] records is 20 per 2/26/23 results. Will send in renally dose Cefdinir; pt to contact me if hx of intolerance to cephalosporin or develops allergic reaction given PCN allergy.

## 2023-03-20 ENCOUNTER — PATIENT MESSAGE (OUTPATIENT)
Dept: FAMILY MEDICINE CLINIC | Age: 88
End: 2023-03-20

## 2023-03-21 NOTE — TELEPHONE ENCOUNTER
CBC order has been faxed and completed.   Faxed: [phone] to [address] [place] [address] Brody Braga

## 2023-03-21 NOTE — TELEPHONE ENCOUNTER
----- Message from Iron Mcleod MD sent at 3/20/2023  1:10 PM EDT -----  Regarding: FW: Home Health lab order  Please fax over a lab requisition of CBC that I previously ordered to this [address]  ----- Message -----  From: Spring Gundersond: 3/20/2023  12:54 PM EDT  To: Iron Mcleod MD  Subject: Winslow Hodgkins: Home Health lab order                          ----- Message -----  From: Loyal Halsted  Sent: 3/20/2023  12:37 PM EDT  To: [facility] Nurse Pool  Subject: Home Health lab order                            Please, [facility] to [phone].

## 2023-03-30 ENCOUNTER — TELEPHONE (OUTPATIENT)
Dept: FAMILY MEDICINE CLINIC | Age: 88
End: 2023-03-30

## 2023-03-30 NOTE — TELEPHONE ENCOUNTER
Latisha for At [address] calledd requesting more physical therapy in the home for the patient  2x a week for 3 weeks need verb al order.     Requesting a call back    Best call back #[phone]

## 2023-04-18 ENCOUNTER — OFFICE VISIT (OUTPATIENT)
Dept: FAMILY MEDICINE CLINIC | Age: 88
End: 2023-04-18
Payer: MEDICARE

## 2023-04-18 ENCOUNTER — NURSE TRIAGE (OUTPATIENT)
Dept: OTHER | Facility: CLINIC | Age: 88
End: 2023-04-18

## 2023-04-18 VITALS
SYSTOLIC BLOOD PRESSURE: 101 MMHG | DIASTOLIC BLOOD PRESSURE: 64 MMHG | WEIGHT: 160 LBS | RESPIRATION RATE: 17 BRPM | TEMPERATURE: 97.4 F | HEART RATE: 70 BPM | HEIGHT: 63 IN | BODY MASS INDEX: 28.35 KG/M2 | OXYGEN SATURATION: 99 %

## 2023-04-18 DIAGNOSIS — M54.42 ACUTE LEFT-SIDED LOW BACK PAIN WITH LEFT-SIDED SCIATICA: ICD-10-CM

## 2023-04-18 DIAGNOSIS — R60.0 LOWER EXTREMITY EDEMA: Primary | ICD-10-CM

## 2023-04-18 PROCEDURE — G0463 HOSPITAL OUTPT CLINIC VISIT: HCPCS | Performed by: STUDENT IN AN ORGANIZED HEALTH CARE EDUCATION/TRAINING PROGRAM

## 2023-04-18 PROCEDURE — 99214 OFFICE O/P EST MOD 30 MIN: CPT | Performed by: STUDENT IN AN ORGANIZED HEALTH CARE EDUCATION/TRAINING PROGRAM

## 2023-04-18 PROCEDURE — G8536 NO DOC ELDER MAL SCRN: HCPCS | Performed by: STUDENT IN AN ORGANIZED HEALTH CARE EDUCATION/TRAINING PROGRAM

## 2023-04-18 PROCEDURE — 1123F ACP DISCUSS/DSCN MKR DOCD: CPT | Performed by: STUDENT IN AN ORGANIZED HEALTH CARE EDUCATION/TRAINING PROGRAM

## 2023-04-18 PROCEDURE — 1101F PT FALLS ASSESS-DOCD LE1/YR: CPT | Performed by: STUDENT IN AN ORGANIZED HEALTH CARE EDUCATION/TRAINING PROGRAM

## 2023-04-18 PROCEDURE — G8432 DEP SCR NOT DOC, RNG: HCPCS | Performed by: STUDENT IN AN ORGANIZED HEALTH CARE EDUCATION/TRAINING PROGRAM

## 2023-04-18 PROCEDURE — G8417 CALC BMI ABV UP PARAM F/U: HCPCS | Performed by: STUDENT IN AN ORGANIZED HEALTH CARE EDUCATION/TRAINING PROGRAM

## 2023-04-18 PROCEDURE — 1090F PRES/ABSN URINE INCON ASSESS: CPT | Performed by: STUDENT IN AN ORGANIZED HEALTH CARE EDUCATION/TRAINING PROGRAM

## 2023-04-18 PROCEDURE — G8427 DOCREV CUR MEDS BY ELIG CLIN: HCPCS | Performed by: STUDENT IN AN ORGANIZED HEALTH CARE EDUCATION/TRAINING PROGRAM

## 2023-04-18 RX ORDER — OXYCODONE HYDROCHLORIDE 5 MG/1
5 TABLET ORAL 2 TIMES DAILY
Qty: 60 TABLET | Refills: 0 | Status: SHIPPED | OUTPATIENT
Start: 2023-04-18 | End: 2023-05-18

## 2023-04-18 NOTE — PROGRESS NOTES
[facility] PRACTICE      Chief Complaint:     Chief Complaint   Patient presents with    Leg Swelling     Patient states have been experiencing swelling and pain on both legs for the past two months. Felipe Maciel is a 80 y.o. female that presents for: leg swelling      Assessment/Plan:     Diagnoses and all orders for this visit:    1. Lower extremity edema  -     CBC WITH AUTOMATED DIFF; Future  -     METABOLIC PANEL, COMPREHENSIVE; Future  -     NT-PRO BNP; Future  -     DUPLEX LOWER EXT VENOUS BILAT; Future    2. Acute left-sided low back pain with left-sided sciatica  -     oxyCODONE IR (ROXICODONE) 5 mg immediate release tablet; Take 1 Tablet by mouth two (2) times a day for 30 days. Max Daily Amount: 10 mg. Follow up:      2 weeks with PCP    Subjective:   HPI:  Felipe Maciel is a 80 y.o. female hx of CHF, HTN, Chronic hep C, CKD IV that presents for:    Per note 8/25/22 : \"She previously saw Dr. David Layton with nephrology for her CKD. He no longer recommended follow-up with him and instead defer to her PCP. She still sees her cardiologist for her chronic heart failure. She takes 60 mg of Lasix daily although her daughter, who is a physician, will sometimes titrate it based on her swelling. She has lost some weight over the past months. She does have low appetite and does not eat a lot of meat. Some of that weight which is possibly due to fluid buildup per daughter. She does have a history of hepatitis C that is never been treated. \"     Had surgery in February (Sacralplexy), diagnosed with spinal stenosis (had XU per daughter)     CC: Bilateral leg swelling :She's been sitting a lot since surgery , trying to prop legs up and wrap legs   Since surgery swelling is getting worse, L>R . Taking Lasix, advised 2-3 pills titarting a day per Cards and Nephro  Daughter has been increasing to 3 pills every other day   Pain in low back and down left leg.  Using 2.5 mg oxycodone BID - Saw orthopedist who said this was radiculopathy-- May 1st ortho spine/pain management appointment requesting Oxy to last them until then     No cp, sob       Health Maintenance:  Health Maintenance Due   Topic Date Due    Hepatitis B Vaccine (1 of 3 - Risk 3-dose series) Never done    Shingles Vaccine (1 of 2) Never done    Pneumococcal 65+ years (2 - PPSV23 if available, else PCV20) 05/11/2017    COVID-19 Vaccine (4 - Booster for Pfizer series) 04/20/2022        ROS:   See HPI      Past medical history, social history, and medications personally reviewed. Past Medical History:   Diagnosis Date    Chronic hepatitis C without hepatic coma ([facility] [address] 02/01/2018    From blood transfusion, no history of treatment. Duodenal ulcer     Gout     HTN, goal below 150/90 2/1/2018    Osteoarthritis     both knees        Allergies personally reviewed. Allergies   Allergen Reactions    Ace Inhibitors Cough    Pcn [Penicillins] Rash          Objective:   Vitals reviewed. Visit Vitals  /64 (BP 1 Location: Left upper arm, BP Patient Position: Sitting, BP Cuff Size: Adult)   Pulse 70   Temp 97.4 °F (36.3 °C) (Temporal)   Resp 17   Ht 5' 3\" (1.6 m)   Wt 160 lb (72.6 kg)   SpO2 99%   BMI 28.34 kg/m²        Wt Readings from Last 3 Encounters:   04/18/23 160 lb (72.6 kg)   10/18/22 171 lb (77.6 kg)   08/25/22 164 lb (74.4 kg)        Physical Exam  Physical Exam     Vitals: Reviewed. General: AO x 3. No distress. Not diaphoretic. No jaundice. No cyanosis. No pallor. HEENT: No thyromegaly or JVD  Cardio: Normal rate, regular rhythm. No murmur, rubs, or gallop. Pulmonary: Effort normal. No accessory muscle use. No wheezes, rales, or rhonchi. Abdominal: Soft. No rebound or guarding. No tenderness. No distension. Extremities: 3+ pitting edema to mid calf bilaterraly. Pulses 2+. Skin: No rash. I have reviewed pertinent labs results and other data.  I have discussed the diagnosis with the patient and the intended plan as seen in the above orders. The patient has received an after-visit summary and questions were answered concerning future plans. I have discussed medication side effects and warnings with the patient as well.     Charleen Timmons,   04/18/23

## 2023-04-18 NOTE — PROGRESS NOTES
Chief Complaint   Patient presents with    Leg Swelling     Patient states have been experiencing swelling and pain on both legs for the past two months. 1. \"Have you been to the ER, urgent care clinic since your last visit? Hospitalized since your last visit? \" No    2. \"Have you seen or consulted any other health care providers outside of the [address] [place] [address] since your last visit? \" No     3. For patients aged 39-70: Has the patient had a colonoscopy / FIT/ Cologuard? NA - based on age      If the patient is female:    4. For patients aged 41-77: Has the patient had a mammogram within the past 2 years? NA - based on age or sex      11. For patients aged 21-65: Has the patient had a pap smear?  NA - based on age or sex         3 most recent PHQ Screens 10/18/2022   Little interest or pleasure in doing things Not at all   Feeling down, depressed, irritable, or hopeless Not at all   Total Score PHQ 2 0       Health Maintenance Due   Topic Date Due    Hepatitis B Vaccine (1 of 3 - Risk 3-dose series) Never done    Shingles Vaccine (1 of 2) Never done    Pneumococcal 65+ years (2 - PPSV23 if available, else PCV20) 05/11/2017    COVID-19 Vaccine (4 - Booster for Pfizer series) 04/20/2022

## 2023-04-18 NOTE — TELEPHONE ENCOUNTER
Location of patient: [address] garces from [place] at [facility] with [employer]. Subjective: Caller states \"She has swelling in both legs\"     Current Symptoms: L more swollen than R. Fractured her sacrum in January and the swelling started not long after that. CHF. Back pain radiating down her leg. No chest pain or SOB. On water pills. Sees Ortho. Onset: 3 months ago; worsening    Associated Symptoms: NA    Temperature: denies fever     LMP: NA Pregnant: NA    Recommended disposition: Go to ED/UCC Now (Or to Office with PCP Approval)    Care advice provided, patient verbalizes understanding; denies any other questions or concerns; instructed to call back for any new or worsening symptoms. Writer provided warm transfer to Guerline WILSON at [facility] for second level triage. Attention Provider: Thank you for allowing me to participate in the care of your patient. The patient was connected to triage in response to information provided to the ECC. Please do not respond through this encounter as the response is not directed to a shared pool.     Reason for Disposition   Thigh, calf, or ankle swelling in both legs, but one side is definitely more swollen (Exception: longstanding difference between legs)    Protocols used: Leg Swelling and Edema-ADULT-[place]

## 2023-04-19 LAB
ALBUMIN SERPL-MCNC: 3.5 G/DL (ref 3.5–5)
ALBUMIN/GLOB SERPL: 1.1 (ref 1.1–2.2)
ALP SERPL-CCNC: 107 U/L (ref 45–117)
ALT SERPL-CCNC: 26 U/L (ref 12–78)
ANION GAP SERPL CALC-SCNC: 5 MMOL/L (ref 5–15)
AST SERPL-CCNC: 37 U/L (ref 15–37)
BASOPHILS # BLD: 0 K/UL (ref 0–0.1)
BASOPHILS NFR BLD: 1 % (ref 0–1)
BILIRUB SERPL-MCNC: 0.8 MG/DL (ref 0.2–1)
BNP SERPL-MCNC: 9791 PG/ML
BUN SERPL-MCNC: 59 MG/DL (ref 6–20)
BUN/CREAT SERPL: 28 (ref 12–20)
CALCIUM SERPL-MCNC: 9.8 MG/DL (ref 8.5–10.1)
CHLORIDE SERPL-SCNC: 107 MMOL/L (ref 97–108)
CO2 SERPL-SCNC: 26 MMOL/L (ref 21–32)
COMMENT, HOLDF: NORMAL
CREAT SERPL-MCNC: 2.13 MG/DL (ref 0.55–1.02)
DIFFERENTIAL METHOD BLD: ABNORMAL
EOSINOPHIL # BLD: 0.3 K/UL (ref 0–0.4)
EOSINOPHIL NFR BLD: 4 % (ref 0–7)
ERYTHROCYTE [DISTWIDTH] IN BLOOD BY AUTOMATED COUNT: 19 % (ref 11.5–14.5)
GLOBULIN SER CALC-MCNC: 3.1 G/DL (ref 2–4)
GLUCOSE SERPL-MCNC: 119 MG/DL (ref 65–100)
HCT VFR BLD AUTO: 33.1 % (ref 35–47)
HGB BLD-MCNC: 10.1 G/DL (ref 11.5–16)
IMM GRANULOCYTES # BLD AUTO: 0 K/UL (ref 0–0.04)
IMM GRANULOCYTES NFR BLD AUTO: 1 % (ref 0–0.5)
LYMPHOCYTES # BLD: 1.6 K/UL (ref 0.8–3.5)
LYMPHOCYTES NFR BLD: 21 % (ref 12–49)
MCH RBC QN AUTO: 31.3 PG (ref 26–34)
MCHC RBC AUTO-ENTMCNC: 30.5 G/DL (ref 30–36.5)
MCV RBC AUTO: 102.5 FL (ref 80–99)
MONOCYTES # BLD: 0.6 K/UL (ref 0–1)
MONOCYTES NFR BLD: 8 % (ref 5–13)
NEUTS SEG # BLD: 5.1 K/UL (ref 1.8–8)
NEUTS SEG NFR BLD: 66 % (ref 32–75)
NRBC # BLD: 0 K/UL (ref 0–0.01)
NRBC BLD-RTO: 0 PER 100 WBC
PLATELET # BLD AUTO: 179 K/UL (ref 150–400)
PMV BLD AUTO: 11.9 FL (ref 8.9–12.9)
POTASSIUM SERPL-SCNC: 3.8 MMOL/L (ref 3.5–5.1)
PROT SERPL-MCNC: 6.6 G/DL (ref 6.4–8.2)
RBC # BLD AUTO: 3.23 M/UL (ref 3.8–5.2)
SAMPLES BEING HELD,HOLD: NORMAL
SODIUM SERPL-SCNC: 138 MMOL/L (ref 136–145)
WBC # BLD AUTO: 7.7 K/UL (ref 3.6–11)

## 2023-04-19 NOTE — PROGRESS NOTES
Probnp elevated, no baseline-- patient has no sob; tried calling to discuss results and left VM  Cr increased from 1.87 to 2.13  Hgb 10.1 from 11.4

## 2023-08-02 NOTE — TELEPHONE ENCOUNTER
PCP: Kentrell Chen MD    Last appt: 4/18/2023       No future appointments.     Requested Prescriptions     Pending Prescriptions Disp Refills    metroNIDAZOLE (METROGEL) 1 % gel [Pharmacy Med Name: METRONIDAZOLE 1 % Gel] 60 g      Sig: APPLY THIN FILM TO AFFECTED AREA ONCE DAILY       Prior labs and Blood pressures:  BP Readings from Last 3 Encounters:   10/18/22 100/60   08/25/22 130/80   01/12/22 133/78     Lab Results   Component Value Date/Time     04/18/2023 03:45 PM    K 3.8 04/18/2023 03:45 PM     04/18/2023 03:45 PM    CO2 26 04/18/2023 03:45 PM    BUN 59 04/18/2023 03:45 PM    GFRAA 31 08/25/2022 11:28 AM     No results found for: HBA1C, XCA3WHIQ  Lab Results   Component Value Date/Time    CHOL 141 02/15/2022 12:00 AM    HDL 49 02/15/2022 12:00 AM    VLDL 17 02/15/2022 12:00 AM     No results found for: VITD3, VD3RIA    Lab Results   Component Value Date/Time    TSH 1.55 08/25/2022 11:28 AM

## 2023-08-03 RX ORDER — METRONIDAZOLE 10 MG/G
GEL TOPICAL
Qty: 60 G | OUTPATIENT
Start: 2023-08-03

## 2023-08-18 ENCOUNTER — TELEMEDICINE (OUTPATIENT)
Age: 88
End: 2023-08-18
Payer: MEDICARE

## 2023-08-18 ENCOUNTER — NURSE TRIAGE (OUTPATIENT)
Dept: OTHER | Facility: CLINIC | Age: 88
End: 2023-08-18

## 2023-08-18 DIAGNOSIS — J20.9 ACUTE BRONCHITIS, UNSPECIFIED ORGANISM: Primary | ICD-10-CM

## 2023-08-18 DIAGNOSIS — R05.9 COUGH IN ADULT: ICD-10-CM

## 2023-08-18 PROCEDURE — 99213 OFFICE O/P EST LOW 20 MIN: CPT | Performed by: INTERNAL MEDICINE

## 2023-08-18 RX ORDER — AZITHROMYCIN 250 MG/1
250 TABLET, FILM COATED ORAL SEE ADMIN INSTRUCTIONS
Qty: 6 TABLET | Refills: 0 | Status: SHIPPED | OUTPATIENT
Start: 2023-08-18 | End: 2023-08-23

## 2023-08-18 RX ORDER — METHYLPREDNISOLONE 4 MG/1
TABLET ORAL
Qty: 21 TABLET | Refills: 0 | Status: SHIPPED | OUTPATIENT
Start: 2023-08-18

## 2023-08-18 RX ORDER — BENZONATATE 100 MG/1
100 CAPSULE ORAL 3 TIMES DAILY PRN
Qty: 30 CAPSULE | Refills: 0 | Status: SHIPPED | OUTPATIENT
Start: 2023-08-18 | End: 2023-08-28

## 2023-08-18 ASSESSMENT — ENCOUNTER SYMPTOMS
BACK PAIN: 0
EYE REDNESS: 0
SHORTNESS OF BREATH: 0
COUGH: 1

## 2023-08-18 NOTE — PROGRESS NOTES
once daily 8/10/23   Hilary Anne MD   acetaminophen (TYLENOL) 500 MG tablet Take 500 mg by mouth every 6 hours as needed    Ar Automatic Reconciliation   allopurinol (ZYLOPRIM) 100 MG tablet Take 200 mg by mouth daily    Ar Automatic Reconciliation   bisacodyl (DULCOLAX) 5 MG EC tablet Take 5 mg by mouth daily 2/27/23   Ar Automatic Reconciliation   esomeprazole (NEXIUM) 20 MG delayed release capsule Take by mouth daily    Ar Automatic Reconciliation   famotidine (PEPCID) 20 MG tablet famotidine 20 mg tablet    Ar Automatic Reconciliation   ferrous sulfate (IRON 325) 325 (65 Fe) MG tablet Take 325 mg by mouth daily 12/16/21   Ar Automatic Reconciliation   furosemide (LASIX) 20 MG tablet Take 20 mg by mouth daily 2/18/20   Ar Automatic Reconciliation   lidocaine (LIDODERM) 5 % Apply patch to the affected area for 12 hours a day and remove for 12 hours a day.  1/30/23   Ar Automatic Reconciliation   losartan (COZAAR) 50 MG tablet Take 50 mg by mouth daily    Ar Automatic Reconciliation   methocarbamol (ROBAXIN) 750 MG tablet ceived the following from [facility]: Outside name: methocarbamoL (ROBAXIN) 750 mg tablet 2/1/23   Ar Automatic Reconciliation   metoprolol succinate (TOPROL XL) 50 MG extended release tablet Take 1 tablet by mouth daily 7/17/21   Ar Automatic Reconciliation   nitroGLYCERIN (NITROSTAT) 0.4 MG SL tablet Take 1 tablet SL at first sign of attack; repeat every 5 minutes if needed for a total of 3 tablets in 15 minutes; if no relief, call 911 2/18/20   Ar Automatic Reconciliation   oxyCODONE (ROXICODONE) 5 MG immediate release tablet ceived the following from [address] [facility]: Outside name: oxyCODONE IR (ROXICODONE) 5 mg immediate release tablet 2/27/23   Ar Automatic Reconciliation   pantoprazole (PROTONIX) 40 MG tablet 40 mg 2/27/23   Ar Automatic Reconciliation   senna-docusate (PERICOLACE) 8.6-50 MG per tablet Take 1 tablet by mouth daily 12/16/21   Ar Automatic

## 2023-08-18 NOTE — TELEPHONE ENCOUNTER
Location of patient: [address] [facility] [place] call from [place] at [facility] [place] with [employer]. Subjective: Caller states \"She has a cough\"     Current Symptoms: SOB only while coughing. No SOB any other times. No chest pain. No wheezing. Onset: 1-2 weeks ago; worsening    Associated Symptoms: NA    Temperature: denies fever     LMP: NA Pregnant: NA    Recommended disposition: See in Office Today or Tomorrow/C or Walk In as Back up    Care advice provided, patient verbalizes understanding; denies any other questions or concerns; instructed to call back for any new or worsening symptoms. Patient/Caller agrees with recommended disposition; writer provided warm transfer to [employer] at [facility] [place] for appointment scheduling    Attention Provider: Thank you for allowing me to participate in the care of your patient. The patient was connected to triage in response to information provided to the ECC/PSC. Please do not respond through this encounter as the response is not directed to a shared pool.     Reason for Disposition   Patient wants to be seen    Protocols used: Cough-ADULT-OH

## 2023-12-13 NOTE — TELEPHONE ENCOUNTER
Refill request.      Rx from [facility] shows patient takes 1 tablet BID before breakfast and dinner. Rx noted as historical provider and once daily per hx. Updated per pharmacy rx. Thanks, Yumiko Morrow    Last appointment: VV 8/18/23 cruz, VV 3/7/23 Nicolasa  Next appointment: 2/1/24 Jose Zavaleta  Previous refill encounter(s): historical provider- Last filled 11/18/23 60    Requested Prescriptions     Pending Prescriptions Disp Refills    bisacodyl (DULCOLAX) 5 MG EC tablet 60 tablet 2     Sig: Take 1 tablet by mouth 2 times daily     For Pharmacy Admin Tracking Only    Program: Medication Refill  CPA in place:    Recommendation Provided To:    Intervention Detail: New Rx: 1, reason: Patient Preference  Intervention Accepted By:   Armani Bronson Closed?:    Time Spent (min): 10

## 2023-12-14 RX ORDER — BISACODYL 5 MG/1
5 TABLET, DELAYED RELEASE ORAL DAILY PRN
Qty: 30 TABLET | Refills: 2 | Status: SHIPPED | OUTPATIENT
Start: 2023-12-14

## 2024-01-11 ENCOUNTER — TELEPHONE (OUTPATIENT)
Age: 89
End: 2024-01-11

## 2024-01-11 NOTE — TELEPHONE ENCOUNTER
I was not aware of any recent hospitalization or home health needs; please clarify with pt and if she needs LATESHA appt set up. Would also need records. Hold off on HH orders until appt.

## 2024-01-11 NOTE — TELEPHONE ENCOUNTER
Noy from [facility] called stating that the patient was discharged today with PT orders, and was hoping that Dr. Baldwin could follow. They are also hoping to get orders for Skilled nursing for he patient. They are hoping to get a call back when it is possible.    Best call back number  [phone]

## 2024-01-12 NOTE — TELEPHONE ENCOUNTER
Called Patient. Spoke with daughter. Name and  confirmed.  Scheduled hospital f/u on 24 at 3:15 PM. Verbalized understanding.

## 2024-01-16 ENCOUNTER — TELEPHONE (OUTPATIENT)
Age: 89
End: 2024-01-16

## 2024-01-16 NOTE — TELEPHONE ENCOUNTER
----- Message from Suzanne Mann sent at 1/16/2024  1:21 PM EST -----  Subject: Message to Provider    QUESTIONS  Information for Provider? Daughter (Divine) requesting call back to   harriet Donnelly f/u to a Virtual Visit due to weather.   ---------------------------------------------------------------------------  --------------  CALL BACK INFO  [phone]; OK to leave message on voicemail  ---------------------------------------------------------------------------  --------------  SCRIPT ANSWERS  Relationship to Patient? Other/Third Party  Representative Name? Divine  Is the representative on the Communication Release of Information (OSORIO)   form in Epic? Yes

## 2024-01-16 NOTE — TELEPHONE ENCOUNTER
Called Patient. Spoke with daughter.  Name and  confirmed.  Scheduled LATESHA appt. On 24 at 2:45 PM. Verbalized understanding.

## 2024-01-18 ENCOUNTER — TELEPHONE (OUTPATIENT)
Age: 89
End: 2024-01-18

## 2024-01-18 ENCOUNTER — TELEMEDICINE (OUTPATIENT)
Age: 89
End: 2024-01-18
Payer: MEDICARE

## 2024-01-18 DIAGNOSIS — N18.4 CHRONIC KIDNEY DISEASE, STAGE 4 (SEVERE) (HCC): ICD-10-CM

## 2024-01-18 DIAGNOSIS — B35.1 TOENAIL FUNGUS: ICD-10-CM

## 2024-01-18 DIAGNOSIS — M79.671 HEEL PAIN, BILATERAL: ICD-10-CM

## 2024-01-18 DIAGNOSIS — I50.9 HEART FAILURE, UNSPECIFIED HF CHRONICITY, UNSPECIFIED HEART FAILURE TYPE (HCC): ICD-10-CM

## 2024-01-18 DIAGNOSIS — Z09 HOSPITAL DISCHARGE FOLLOW-UP: Primary | ICD-10-CM

## 2024-01-18 DIAGNOSIS — B18.2 CHRONIC HEPATITIS C WITHOUT HEPATIC COMA (HCC): ICD-10-CM

## 2024-01-18 DIAGNOSIS — L89.309 PRESSURE INJURY OF SKIN OF BUTTOCK, UNSPECIFIED INJURY STAGE, UNSPECIFIED LATERALITY: ICD-10-CM

## 2024-01-18 DIAGNOSIS — M79.672 HEEL PAIN, BILATERAL: ICD-10-CM

## 2024-01-18 PROCEDURE — 99214 OFFICE O/P EST MOD 30 MIN: CPT | Performed by: FAMILY MEDICINE

## 2024-01-18 PROCEDURE — 1123F ACP DISCUSS/DSCN MKR DOCD: CPT | Performed by: FAMILY MEDICINE

## 2024-01-18 RX ORDER — OMEGA-3S/DHA/EPA/FISH OIL/D3 300MG-1000
400 CAPSULE ORAL DAILY
COMMUNITY

## 2024-01-18 RX ORDER — BISACODYL 5 MG/1
10 TABLET, DELAYED RELEASE ORAL DAILY PRN
Qty: 30 TABLET | Refills: 2
Start: 2024-01-18

## 2024-01-18 RX ORDER — METOLAZONE 5 MG/1
5 TABLET ORAL
COMMUNITY

## 2024-01-18 RX ORDER — ASCORBIC ACID 500 MG
TABLET ORAL
COMMUNITY

## 2024-01-18 RX ORDER — POTASSIUM CHLORIDE 1500 MG/1
20 TABLET, EXTENDED RELEASE ORAL 3 TIMES DAILY
COMMUNITY
Start: 2023-12-28

## 2024-01-18 ASSESSMENT — ENCOUNTER SYMPTOMS
WHEEZING: 0
NAUSEA: 0
COUGH: 0
DIARRHEA: 0
ABDOMINAL PAIN: 0
CONSTIPATION: 0
SHORTNESS OF BREATH: 0
CHEST TIGHTNESS: 0
VOMITING: 0

## 2024-01-18 NOTE — PROGRESS NOTES
Vangie Lu, was evaluated through a synchronous (real-time) audio-video encounter. The patient (or guardian if applicable) is aware that this is a billable service, which includes applicable co-pays. This Virtual Visit was conducted with patient's (and/or legal guardian's) consent. Patient identification was verified, and a caregiver was present when appropriate.   The patient was located at Home: [address] [place] [id number]  Provider was located at Facility (Appt Dept): [address] [place] [id number]       Sharon Baldwin MD    Subjective:   Vangie Lu is a 89 y.o. female who was seen for:    Patient was recently admitted at [facility] for pulmonary edema from 1/9/24 to 1/11/24.  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  Nephrology was consulted and her creatinine improved.  She  She was discharged home and has since seen outpatient nephrology.  She is currently on Lasix 80 mg twice daily and metolazone 5 mg 3 times a week.  She is now on potassium supplements. Labs are being monitored by nephrology.  She is getting home health services including PT/OT.  Daughter is concerned that she may have a possible pressure ulcer along her buttock and would like a prescription for sheepskin wheelchair pad which has helped in the past.  PT also had recommended bilateral Unna boots to help with her heel pain when in bed.  Has possible toenail fungus. Would like podiatry recommendation.    Current Outpatient Medications on File Prior to Visit   Medication Sig Dispense Refill    metOLazone (ZAROXOLYN) 5 MG tablet Take 1 tablet by mouth three times a week      potassium chloride (KLOR-CON M) 20 MEQ TBCR extended release tablet Take 1 tablet by mouth 3 times daily      vitamin D3 (CHOLECALCIFEROL) 10 MCG (400 UNIT) TABS tablet Take 1 tablet by mouth daily      metroNIDAZOLE (METROGEL) 1 % gel Apply thin film to affected area once daily 60 g 1

## 2024-01-18 NOTE — TELEPHONE ENCOUNTER
----- Message from Sharon Baldwin MD sent at 1/18/2024  3:45 PM EST -----  She wants to cancel her upcoming [facility] appt

## 2024-01-23 ENCOUNTER — TELEMEDICINE (OUTPATIENT)
Age: 89
End: 2024-01-23
Payer: MEDICARE

## 2024-01-23 DIAGNOSIS — J06.9 UPPER RESPIRATORY TRACT INFECTION, UNSPECIFIED TYPE: Primary | ICD-10-CM

## 2024-01-23 PROCEDURE — 99213 OFFICE O/P EST LOW 20 MIN: CPT | Performed by: INTERNAL MEDICINE

## 2024-01-23 PROCEDURE — 1123F ACP DISCUSS/DSCN MKR DOCD: CPT | Performed by: INTERNAL MEDICINE

## 2024-01-23 RX ORDER — METHYLPREDNISOLONE 4 MG/1
TABLET ORAL
Qty: 21 TABLET | Refills: 0 | Status: SHIPPED | OUTPATIENT
Start: 2024-01-23

## 2024-01-23 RX ORDER — BENZONATATE 100 MG/1
100 CAPSULE ORAL 3 TIMES DAILY PRN
Qty: 30 CAPSULE | Refills: 0 | Status: SHIPPED | OUTPATIENT
Start: 2024-01-23 | End: 2024-02-02

## 2024-01-23 RX ORDER — AZITHROMYCIN 250 MG/1
250 TABLET, FILM COATED ORAL SEE ADMIN INSTRUCTIONS
Qty: 6 TABLET | Refills: 1 | Status: SHIPPED | OUTPATIENT
Start: 2024-01-23 | End: 2024-01-28

## 2024-01-23 ASSESSMENT — ENCOUNTER SYMPTOMS
COUGH: 1
SINUS PRESSURE: 0
SINUS PAIN: 0
SORE THROAT: 0

## 2024-01-23 NOTE — PROGRESS NOTES
mouth three times a week    Jennifer Stubbs MD   potassium chloride (KLOR-CON M) 20 MEQ TBCR extended release tablet Take 1 tablet by mouth 3 times daily 12/28/23   Jennifer Stubbs MD   ascorbic acid (VITAMIN C) 500 MG tablet Take 1 tablet every day by oral route for 45 days.    Jennifer Stubbs MD   vitamin D3 (CHOLECALCIFEROL) 10 MCG (400 UNIT) TABS tablet Take 1 tablet by mouth daily    Jennifer Stubbs MD   bisacodyl (DULCOLAX) 5 MG EC tablet Take 2 tablets by mouth daily as needed for Constipation 1/18/24   Sharon Baldwin MD   Misc. Devices MISC Bilateral (two) Unna Boots for heel pain, extra large 1/18/24   Sharon Baldwin MD   Misc. Devices MISC One medical sheepskin wheelchair pad 1/18/24   Sharon Baldwin MD   metroNIDAZOLE (METROGEL) 1 % gel Apply thin film to affected area once daily 8/10/23   Sharon Baldwin MD   allopurinol (ZYLOPRIM) 100 MG tablet Take 2 tablets by mouth daily    Automatic Reconciliation, Ar   famotidine (PEPCID) 20 MG tablet famotidine 20 mg tablet    Automatic Reconciliation, Ar   furosemide (LASIX) 40 MG tablet Take 2 tablets by mouth 2 times daily 2/18/20   Automatic Reconciliation, Ar   metoprolol succinate (TOPROL XL) 50 MG extended release tablet Take 1 tablet by mouth daily 7/17/21   Automatic Reconciliation, Ar   nitroGLYCERIN (NITROSTAT) 0.4 MG SL tablet Take 1 tablet SL at first sign of attack; repeat every 5 minutes if needed for a total of 3 tablets in 15 minutes; if no relief, call 911 2/18/20   Automatic Reconciliation, Ar     Allergies   Allergen Reactions    Ace Inhibitors Cough    Penicillins Rash     Past Medical History:   Diagnosis Date    Chronic hepatitis C without hepatic coma (HCC) 02/01/2018    From blood transfusion, no history of treatment.    Duodenal ulcer     Gout     HTN, goal below 150/90 2/1/2018    Osteoarthritis     both knees     Past Surgical History:   Procedure Laterality Date    CATARACT REMOVAL Bilateral 2008

## 2024-02-02 RX ORDER — METRONIDAZOLE 10 MG/G
GEL TOPICAL
Qty: 60 G | Refills: 3 | Status: SHIPPED | OUTPATIENT
Start: 2024-02-02

## 2024-02-02 NOTE — TELEPHONE ENCOUNTER
PCP: Sharon Baldwin MD    Last appt: 1/23/2024       No future appointments.    Requested Prescriptions     Pending Prescriptions Disp Refills    metroNIDAZOLE (METROGEL) 1 % gel [Pharmacy Med Name: METRONIDAZOLE 1 % Gel] 60 g 3     Sig: APPLY THIN FILM TO AFFECTED AREA ONCE DAILY       Prior labs and Blood pressures:  BP Readings from Last 3 Encounters:   04/18/23 101/64   10/18/22 100/60   08/25/22 130/80     Lab Results   Component Value Date/Time     04/18/2023 03:45 PM    K 3.8 04/18/2023 03:45 PM     04/18/2023 03:45 PM    CO2 26 04/18/2023 03:45 PM    BUN 59 04/18/2023 03:45 PM    GFRAA 31 08/25/2022 11:28 AM     No results found for: \"HBA1C\", \"[id number]\"  Lab Results   Component Value Date/Time    CHOL 141 02/15/2022 12:00 AM    HDL 49 02/15/2022 12:00 AM    VLDL 17 02/15/2022 12:00 AM     No results found for: \"VITD3\", \"VD3RIA\"    Lab Results   Component Value Date/Time    TSH 1.55 08/25/2022 11:28 AM

## 2024-05-01 PROBLEM — R60.9 EDEMA: Status: ACTIVE | Noted: 2024-05-01

## 2024-05-06 ENCOUNTER — HOSPITAL ENCOUNTER (OUTPATIENT)
Facility: HOSPITAL | Age: 89
Setting detail: INFUSION SERIES
Discharge: HOME | End: 2024-05-06
Payer: MEDICARE

## 2024-05-06 VITALS
DIASTOLIC BLOOD PRESSURE: 61 MMHG | HEART RATE: 69 BPM | SYSTOLIC BLOOD PRESSURE: 98 MMHG | RESPIRATION RATE: 18 BRPM | TEMPERATURE: 97.7 F

## 2024-05-06 DIAGNOSIS — R60.9 EDEMA, UNSPECIFIED TYPE: ICD-10-CM

## 2024-05-06 DIAGNOSIS — D50.9 IRON DEFICIENCY ANEMIA, UNSPECIFIED IRON DEFICIENCY ANEMIA TYPE: Primary | ICD-10-CM

## 2024-05-06 PROCEDURE — 96365 THER/PROPH/DIAG IV INF INIT: CPT

## 2024-05-06 PROCEDURE — 96375 TX/PRO/DX INJ NEW DRUG ADDON: CPT

## 2024-05-06 PROCEDURE — 96366 THER/PROPH/DIAG IV INF ADDON: CPT

## 2024-05-06 PROCEDURE — 6360000002 HC RX W HCPCS: Performed by: INTERNAL MEDICINE

## 2024-05-06 PROCEDURE — 2580000003 HC RX 258: Performed by: INTERNAL MEDICINE

## 2024-05-06 RX ORDER — FUROSEMIDE 10 MG/ML
80 INJECTION INTRAMUSCULAR; INTRAVENOUS ONCE
Status: CANCELLED
Start: 2024-05-07 | End: 2024-05-07

## 2024-05-06 RX ORDER — SODIUM CHLORIDE 9 MG/ML
5-250 INJECTION, SOLUTION INTRAVENOUS PRN
Status: DISCONTINUED | OUTPATIENT
Start: 2024-05-06 | End: 2024-05-07 | Stop reason: HOSPADM

## 2024-05-06 RX ORDER — SODIUM CHLORIDE 9 MG/ML
5-250 INJECTION, SOLUTION INTRAVENOUS PRN
OUTPATIENT
Start: 2024-05-20

## 2024-05-06 RX ORDER — FUROSEMIDE 10 MG/ML
80 INJECTION INTRAMUSCULAR; INTRAVENOUS ONCE
Status: COMPLETED | OUTPATIENT
Start: 2024-05-06 | End: 2024-05-06

## 2024-05-06 RX ADMIN — FUROSEMIDE 80 MG: 10 INJECTION, SOLUTION INTRAMUSCULAR; INTRAVENOUS at 13:15

## 2024-05-06 RX ADMIN — SODIUM CHLORIDE 300 MG: 9 INJECTION, SOLUTION INTRAVENOUS at 11:32

## 2024-05-06 RX ADMIN — SODIUM CHLORIDE 20 ML/HR: 9 INJECTION, SOLUTION INTRAVENOUS at 11:12

## 2024-05-06 ASSESSMENT — PAIN SCALES - GENERAL: PAINLEVEL_OUTOF10: 0

## 2024-05-06 NOTE — PROGRESS NOTES
[facility] Peds/Adult Note                   Date: May 6, 2024    Name: Vangie Lu    MRN: [id number]       : 10/5/1929    1100 Patient arrives for IV Lasix (1/3 doses) and Venofer (1/3 dose) without acute problems. Please see Epic for complete assessment and education provided.    Vital signs stable throughout and prior to discharge. Patient tolerated procedure well and was discharged without incident.  Patient and hr daughter is aware of next [facility] appointment on 5/10/24.Appointment card give to the daughter of the patient.     Ms. Lu's vitals were reviewed prior to and after treatment.   Patient Vitals for the past 12 hrs:   Temp Pulse Resp BP   24 1327 -- 69 18 98/61   24 1305 -- 68 18 105/66   24 1130 -- 67 18 (!) 98/50   24 1059 97.7 °F (36.5 °C) 71 20 (!) 117/57     Medications given:   Medications Administered         0.9 % sodium chloride infusion Admin Date  2024 Action  New Bag Dose  20 mL/hr Rate  20 mL/hr Route  IntraVENous Administered By  Deana Murguia RN        furosemide (LASIX) injection 80 mg Admin Date  2024 Action  Given Dose  80 mg Rate   Route  IntraVENous Administered By  Deana Murguia RN        iron sucrose (VENOFER) 300 mg in sodium chloride 0.9 % 250 mL IVPB Admin Date  2024 Action  New Bag Dose  300 mg Rate  193.3 mL/hr Route  IntraVENous Administered By  Deana Murguia RN          Ms. Lu tolerated the infusion, and had no complaints.    [facility].     Future Appointments   Date Time Provider Department Center   5/10/2024  2:00 PM PEDS FASTTRACK 1 [facility]   2024  1:30 PM PEDS FASTTRACK 2 [facility]   2024  9:00 AM PEDS MED INF CHAIR 2 [facility]   6/3/2024 11:00 AM PEDS MED INF CHAIR 1 [facility]       DEANA MURGUIA RN  May 6, 2024  2:40 PM

## 2024-05-10 ENCOUNTER — HOSPITAL ENCOUNTER (OUTPATIENT)
Facility: HOSPITAL | Age: 89
Setting detail: INFUSION SERIES
Discharge: HOME | End: 2024-05-10
Payer: MEDICARE

## 2024-05-10 ENCOUNTER — OFFICE VISIT (OUTPATIENT)
Age: 89
End: 2024-05-10
Payer: MEDICARE

## 2024-05-10 VITALS
OXYGEN SATURATION: 97 % | SYSTOLIC BLOOD PRESSURE: 105 MMHG | DIASTOLIC BLOOD PRESSURE: 50 MMHG | HEIGHT: 63 IN | TEMPERATURE: 97.7 F | HEART RATE: 81 BPM | BODY MASS INDEX: 28.34 KG/M2

## 2024-05-10 VITALS
HEART RATE: 76 BPM | RESPIRATION RATE: 18 BRPM | TEMPERATURE: 97.6 F | SYSTOLIC BLOOD PRESSURE: 95 MMHG | DIASTOLIC BLOOD PRESSURE: 61 MMHG

## 2024-05-10 DIAGNOSIS — L03.116 CELLULITIS OF LEFT LOWER EXTREMITY: Primary | ICD-10-CM

## 2024-05-10 DIAGNOSIS — R60.9 EDEMA, UNSPECIFIED TYPE: Primary | ICD-10-CM

## 2024-05-10 PROCEDURE — 1036F TOBACCO NON-USER: CPT

## 2024-05-10 PROCEDURE — 1123F ACP DISCUSS/DSCN MKR DOCD: CPT

## 2024-05-10 PROCEDURE — 99213 OFFICE O/P EST LOW 20 MIN: CPT

## 2024-05-10 PROCEDURE — 1090F PRES/ABSN URINE INCON ASSESS: CPT

## 2024-05-10 PROCEDURE — 6360000002 HC RX W HCPCS: Performed by: INTERNAL MEDICINE

## 2024-05-10 PROCEDURE — 96374 THER/PROPH/DIAG INJ IV PUSH: CPT

## 2024-05-10 PROCEDURE — G8427 DOCREV CUR MEDS BY ELIG CLIN: HCPCS

## 2024-05-10 PROCEDURE — G8417 CALC BMI ABV UP PARAM F/U: HCPCS

## 2024-05-10 RX ORDER — FUROSEMIDE 10 MG/ML
80 INJECTION INTRAMUSCULAR; INTRAVENOUS ONCE
Status: COMPLETED | OUTPATIENT
Start: 2024-05-10 | End: 2024-05-10

## 2024-05-10 RX ORDER — FUROSEMIDE 10 MG/ML
80 INJECTION INTRAMUSCULAR; INTRAVENOUS ONCE
Status: CANCELLED
Start: 2024-05-11 | End: 2024-05-11

## 2024-05-10 RX ORDER — HYDROCOLLOID DRESSING 6" X 6"
1 BANDAGE TOPICAL 2 TIMES DAILY
Qty: 50 EACH | Refills: 0 | Status: SHIPPED | OUTPATIENT
Start: 2024-05-10

## 2024-05-10 RX ORDER — CEPHALEXIN 250 MG/1
250 CAPSULE ORAL 2 TIMES DAILY
Qty: 14 CAPSULE | Refills: 0 | Status: SHIPPED | OUTPATIENT
Start: 2024-05-10 | End: 2024-05-17

## 2024-05-10 RX ORDER — ADHESIVE TAPE 3"X 2.3 YD
1 TAPE, NON-MEDICATED TOPICAL 2 TIMES DAILY
Qty: 30 EACH | Refills: 0 | Status: SHIPPED | OUTPATIENT
Start: 2024-05-10

## 2024-05-10 RX ADMIN — FUROSEMIDE 80 MG: 10 INJECTION, SOLUTION INTRAMUSCULAR; INTRAVENOUS at 14:15

## 2024-05-10 SDOH — ECONOMIC STABILITY: FOOD INSECURITY: WITHIN THE PAST 12 MONTHS, YOU WORRIED THAT YOUR FOOD WOULD RUN OUT BEFORE YOU GOT MONEY TO BUY MORE.: NEVER TRUE

## 2024-05-10 SDOH — ECONOMIC STABILITY: FOOD INSECURITY: WITHIN THE PAST 12 MONTHS, THE FOOD YOU BOUGHT JUST DIDN'T LAST AND YOU DIDN'T HAVE MONEY TO GET MORE.: NEVER TRUE

## 2024-05-10 SDOH — ECONOMIC STABILITY: HOUSING INSECURITY
IN THE LAST 12 MONTHS, WAS THERE A TIME WHEN YOU DID NOT HAVE A STEADY PLACE TO SLEEP OR SLEPT IN A SHELTER (INCLUDING NOW)?: NO

## 2024-05-10 SDOH — ECONOMIC STABILITY: INCOME INSECURITY: HOW HARD IS IT FOR YOU TO PAY FOR THE VERY BASICS LIKE FOOD, HOUSING, MEDICAL CARE, AND HEATING?: NOT HARD AT ALL

## 2024-05-10 ASSESSMENT — PATIENT HEALTH QUESTIONNAIRE - PHQ9
1. LITTLE INTEREST OR PLEASURE IN DOING THINGS: NOT AT ALL
2. FEELING DOWN, DEPRESSED OR HOPELESS: NOT AT ALL
SUM OF ALL RESPONSES TO PHQ QUESTIONS 1-9: 0
SUM OF ALL RESPONSES TO PHQ9 QUESTIONS 1 & 2: 0
SUM OF ALL RESPONSES TO PHQ QUESTIONS 1-9: 0

## 2024-05-10 ASSESSMENT — PAIN SCALES - GENERAL: PAINLEVEL_OUTOF10: 0

## 2024-05-10 NOTE — PROGRESS NOTES
Patient Name: Vangie Lu   MRN: [id number]    SUBJECTIVE  Vangie Lu is a 89 y.o. female who presents with the following: bilateral lower extremity swelling with weeping. Daughter, who is accompanying patient, reports swelling has been going on for sometime. Weeping/oozing began 2-3 days ago. Daughter has been wrapping the leg with ace bandage and applying neosporin. Patient scheduled for lasix infusion on 5/13. Denies fever or chills.     ROS negative unless specified in HPI    The patient's medications, allergies, past medical history, surgical history, family history and social history were reviewed and updated where appropriate.    Current Outpatient Medications on File Prior to Visit   Medication Sig Dispense Refill    metroNIDAZOLE (METROGEL) 1 % gel APPLY THIN FILM TO AFFECTED AREA ONCE DAILY 60 g 3    metOLazone (ZAROXOLYN) 5 MG tablet Take 1 tablet by mouth three times a week      potassium chloride (KLOR-CON M) 20 MEQ TBCR extended release tablet Take 1 tablet by mouth in the morning, at noon, in the evening, and at bedtime      ascorbic acid (VITAMIN C) 500 MG tablet       bisacodyl (DULCOLAX) 5 MG EC tablet Take 2 tablets by mouth daily as needed for Constipation 30 tablet 2    Misc. Devices MISC Bilateral (two) Unna Boots for heel pain, extra large 2 each 0    Misc. Devices MISC One medical sheepskin wheelchair pad 1 each 0    allopurinol (ZYLOPRIM) 100 MG tablet Take 2 tablets by mouth daily      famotidine (PEPCID) 20 MG tablet famotidine 20 mg tablet      furosemide (LASIX) 40 MG tablet Take 2 tablets by mouth 2 times daily      metoprolol succinate (TOPROL XL) 50 MG extended release tablet Take 1 tablet by mouth daily      nitroGLYCERIN (NITROSTAT) 0.4 MG SL tablet Take 1 tablet SL at first sign of attack; repeat every 5 minutes if needed for a total of 3 tablets in 15 minutes; if no relief, call 911      methylPREDNISolone (MEDROL DOSEPACK) 4 MG tablet Take by mouth as directed

## 2024-05-10 NOTE — PROGRESS NOTES
[facility] Short Note                   Date: May 10, 2024    Name: Vangie Lu    MRN: [id number]       : 10/5/1929      1400 Pt admit to [facility] for IV Lasix (dose 2/3) ambulatory in stable condition. Assessment completed. No new concerns voiced.     Ms. Lu's vitals were reviewed prior to and after treatment.   Patient Vitals for the past 12 hrs:   Temp Pulse Resp BP   05/10/24 1412 97.6 °F (36.4 °C) 76 18 95/61     Medications given:   Medications Administered         furosemide (LASIX) injection 80 mg Admin Date  05/10/2024 Action  Given Dose  80 mg Route  IntraVENous Administered By  Deana Murguia RN          Ms. Lu tolerated the infusion, and had no complaints.    [facility] condition.     Future Appointments   Date Time Provider Department Center   2024  1:30 PM PEDS FASTTRACK 2 [facility]   2024  9:00 AM PEDS MED INF CHAIR 2 [facility]   6/3/2024 11:00 AM PEDS MED INF CHAIR 1 [facility]       DEANA MURGUIA RN  May 10, 2024  2:39 PM

## 2024-05-10 NOTE — PROGRESS NOTES
Chief Complaint   Patient presents with    Leg Swelling     Bilateral leg swelling. Going on for awhile and following up with nephrology. Open wound of left leg. Pts daughter has been using neosporin and wrapping it with ace bandage. 2-3 cm in length. Looks like an abrasion.      \"Have you been to the ER, urgent care clinic since your last visit?  Hospitalized since your last visit?\"    YES- [facility]-Edema and shortness of breath    “Have you seen or consulted any other health care providers outside of [facility] since your last visit?”    NO                   10/18/2022    10:20 AM   PHQ-9    Little interest or pleasure in doing things 0   Feeling down, depressed, or hopeless 0   PHQ-2 Score 0   PHQ-9 Total Score 0           Financial Resource Strain: Low Risk  (10/18/2022)    Overall Financial Resource Strain (CARDIA)     Difficulty of Paying Living Expenses: Not hard at all      Food Insecurity: Not on file (1/15/2024)          Health Maintenance Due   Topic Date Due    Hepatitis A vaccine (1 of 2 - Risk 2-dose series) Never done    Shingles vaccine (1 of 2) Never done    Hepatitis B vaccine (1 of 3 - Risk 3-dose series) Never done    Respiratory Syncytial Virus (RSV) Pregnant or age 60 yrs+ (1 - 1-dose 60+ series) Never done    Pneumococcal 65+ years Vaccine (2 of 2 - PPSV23 or PCV20) 05/11/2017    COVID-19 Vaccine (4 - 2023-24 season) 09/01/2023    Depression Screen  10/18/2023    Annual Wellness Visit (Medicare)  11/27/2023

## 2024-05-13 ENCOUNTER — HOSPITAL ENCOUNTER (OUTPATIENT)
Facility: HOSPITAL | Age: 89
Setting detail: INFUSION SERIES
Discharge: HOME | End: 2024-05-13
Payer: MEDICARE

## 2024-05-13 VITALS
HEART RATE: 75 BPM | RESPIRATION RATE: 18 BRPM | SYSTOLIC BLOOD PRESSURE: 113 MMHG | OXYGEN SATURATION: 98 % | DIASTOLIC BLOOD PRESSURE: 72 MMHG | TEMPERATURE: 97.4 F

## 2024-05-13 DIAGNOSIS — R60.9 EDEMA, UNSPECIFIED TYPE: Primary | ICD-10-CM

## 2024-05-13 PROCEDURE — 6360000002 HC RX W HCPCS: Performed by: INTERNAL MEDICINE

## 2024-05-13 PROCEDURE — 96374 THER/PROPH/DIAG INJ IV PUSH: CPT

## 2024-05-13 RX ORDER — FUROSEMIDE 10 MG/ML
80 INJECTION INTRAMUSCULAR; INTRAVENOUS ONCE
Status: COMPLETED | OUTPATIENT
Start: 2024-05-13 | End: 2024-05-13

## 2024-05-13 RX ORDER — FUROSEMIDE 10 MG/ML
80 INJECTION INTRAMUSCULAR; INTRAVENOUS ONCE
Status: CANCELLED
Start: 2024-05-13 | End: 2024-05-13

## 2024-05-13 RX ADMIN — FUROSEMIDE 80 MG: 10 INJECTION, SOLUTION INTRAMUSCULAR; INTRAVENOUS at 13:42

## 2024-05-13 ASSESSMENT — PAIN SCALES - GENERAL: PAINLEVEL_OUTOF10: 0

## 2024-05-13 NOTE — PROGRESS NOTES
[facility] Short Note                   Date: May 13, 2024    Name: Vangie Lu    MRN: [id number]       : 10/5/1929      1400 Pt admit to [facility] for IV Lasix (dose 3/3) ambulatory in stable condition. Assessment completed. No new concerns voiced.     Ms. Lu's vitals were reviewed prior to and after treatment.   Patient Vitals for the past 12 hrs:   Temp Pulse Resp BP SpO2   24 1323 97.4 °F (36.3 °C) 75 18 113/72 98 %     Medications given:   Medications Administered         furosemide (LASIX) injection 80 mg Admin Date  2024 Action  Given Dose  80 mg Route  IntraVENous Administered By  Deana Murguia RN            Ms. Lu tolerated the infusion, and had no complaints.    [facility] condition.     Future Appointments   Date Time Provider Department Center   2024  1:00 PM PEDS MED INF CHAIR 2 [facility]   6/3/2024 11:00 AM PEDS MED INF CHAIR 1 [facility]       DEANA MURGUIA RN  May 13, 2024  2:21 PM

## 2024-05-20 ENCOUNTER — HOSPITAL ENCOUNTER (OUTPATIENT)
Facility: HOSPITAL | Age: 89
Setting detail: INFUSION SERIES
Discharge: HOME | End: 2024-05-20
Payer: MEDICARE

## 2024-05-20 VITALS
SYSTOLIC BLOOD PRESSURE: 114 MMHG | OXYGEN SATURATION: 97 % | DIASTOLIC BLOOD PRESSURE: 72 MMHG | TEMPERATURE: 97.4 F | HEART RATE: 56 BPM | RESPIRATION RATE: 18 BRPM

## 2024-05-20 DIAGNOSIS — D50.9 IRON DEFICIENCY ANEMIA, UNSPECIFIED IRON DEFICIENCY ANEMIA TYPE: Primary | ICD-10-CM

## 2024-05-20 DIAGNOSIS — R60.9 EDEMA, UNSPECIFIED TYPE: ICD-10-CM

## 2024-05-20 LAB
ALBUMIN SERPL-MCNC: 3 G/DL (ref 3.5–5)
ALBUMIN/GLOB SERPL: 0.7 (ref 1.1–2.2)
ALP SERPL-CCNC: 123 U/L (ref 45–117)
ALT SERPL-CCNC: 47 U/L (ref 12–78)
ANION GAP SERPL CALC-SCNC: 6 MMOL/L (ref 5–15)
AST SERPL-CCNC: 77 U/L (ref 15–37)
BILIRUB SERPL-MCNC: 0.8 MG/DL (ref 0.2–1)
BUN SERPL-MCNC: 98 MG/DL (ref 6–20)
BUN/CREAT SERPL: 35 (ref 12–20)
CALCIUM SERPL-MCNC: 10.2 MG/DL (ref 8.5–10.1)
CHLORIDE SERPL-SCNC: 96 MMOL/L (ref 97–108)
CO2 SERPL-SCNC: 32 MMOL/L (ref 21–32)
CREAT SERPL-MCNC: 2.83 MG/DL (ref 0.55–1.02)
GLOBULIN SER CALC-MCNC: 4.3 G/DL (ref 2–4)
GLUCOSE SERPL-MCNC: 133 MG/DL (ref 65–100)
MAGNESIUM SERPL-MCNC: 2.2 MG/DL (ref 1.6–2.4)
POTASSIUM SERPL-SCNC: 3.9 MMOL/L (ref 3.5–5.1)
PROT SERPL-MCNC: 7.3 G/DL (ref 6.4–8.2)
SODIUM SERPL-SCNC: 134 MMOL/L (ref 136–145)

## 2024-05-20 PROCEDURE — 80053 COMPREHEN METABOLIC PANEL: CPT

## 2024-05-20 PROCEDURE — 2580000003 HC RX 258: Performed by: INTERNAL MEDICINE

## 2024-05-20 PROCEDURE — 96366 THER/PROPH/DIAG IV INF ADDON: CPT

## 2024-05-20 PROCEDURE — 96365 THER/PROPH/DIAG IV INF INIT: CPT

## 2024-05-20 PROCEDURE — 96375 TX/PRO/DX INJ NEW DRUG ADDON: CPT

## 2024-05-20 PROCEDURE — 6360000002 HC RX W HCPCS: Performed by: INTERNAL MEDICINE

## 2024-05-20 PROCEDURE — 36415 COLL VENOUS BLD VENIPUNCTURE: CPT

## 2024-05-20 PROCEDURE — 83735 ASSAY OF MAGNESIUM: CPT

## 2024-05-20 RX ORDER — SODIUM CHLORIDE 9 MG/ML
5-250 INJECTION, SOLUTION INTRAVENOUS PRN
Status: DISCONTINUED | OUTPATIENT
Start: 2024-05-20 | End: 2024-05-21 | Stop reason: HOSPADM

## 2024-05-20 RX ORDER — FUROSEMIDE 10 MG/ML
80 INJECTION INTRAMUSCULAR; INTRAVENOUS ONCE
Status: COMPLETED | OUTPATIENT
Start: 2024-05-20 | End: 2024-05-20

## 2024-05-20 RX ORDER — FUROSEMIDE 10 MG/ML
80 INJECTION INTRAMUSCULAR; INTRAVENOUS ONCE
Status: CANCELLED
Start: 2024-06-03 | End: 2024-06-03

## 2024-05-20 RX ORDER — FUROSEMIDE 10 MG/ML
20 INJECTION INTRAMUSCULAR; INTRAVENOUS ONCE
Status: CANCELLED
Start: 2024-05-20 | End: 2024-05-20

## 2024-05-20 RX ORDER — SODIUM CHLORIDE 9 MG/ML
5-250 INJECTION, SOLUTION INTRAVENOUS PRN
OUTPATIENT
Start: 2024-06-03

## 2024-05-20 RX ADMIN — SODIUM CHLORIDE 300 MG: 9 INJECTION, SOLUTION INTRAVENOUS at 13:43

## 2024-05-20 RX ADMIN — SODIUM CHLORIDE 25 ML/HR: 9 INJECTION, SOLUTION INTRAVENOUS at 13:29

## 2024-05-20 RX ADMIN — FUROSEMIDE 80 MG: 10 INJECTION, SOLUTION INTRAMUSCULAR; INTRAVENOUS at 15:36

## 2024-05-20 ASSESSMENT — PAIN DESCRIPTION - PAIN TYPE: TYPE: ACUTE PAIN

## 2024-05-20 ASSESSMENT — PAIN SCALES - GENERAL
PAINLEVEL_OUTOF10: 7
PAINLEVEL_OUTOF10: 0

## 2024-05-20 ASSESSMENT — PAIN DESCRIPTION - DESCRIPTORS: DESCRIPTORS: CRAMPING

## 2024-05-20 ASSESSMENT — PAIN DESCRIPTION - ORIENTATION: ORIENTATION: RIGHT

## 2024-05-20 ASSESSMENT — PAIN DESCRIPTION - LOCATION: LOCATION: ARM

## 2024-05-20 NOTE — PROGRESS NOTES
[facility] Peds/Adult Note                       Date: May 20, 2024    Name: Vangie Lu    MRN: [id number]         : 10/5/1929    1310 Patient arrives for IV Venofer followed by IV Lasix without acute problems. Please see Epic for complete assessment and education provided.    Vital signs stable throughout and prior to discharge. Patient tolerated procedure well and was discharged without incident.  Patient/Daughter is aware of next [facility] appointment on 2024.  Appointment card give to the Patient/Daughter.       Ms. Lu's vitals were reviewed prior to and after treatment.   Patient Vitals for the past 12 hrs:   Temp Pulse Resp BP SpO2   24 1556 -- 56 18 114/72 --   24 1520 -- 76 18 111/69 --   24 1310 97.4 °F (36.3 °C) 72 18 118/73 97 %         Lab results were obtained and reviewed.  Recent Results (from the past 12 hour(s))   Comprehensive Metabolic Panel    Collection Time: 24  1:23 PM   Result Value Ref Range    Sodium 134 (L) 136 - 145 mmol/L    Potassium 3.9 3.5 - 5.1 mmol/L    Chloride 96 (L) 97 - 108 mmol/L    CO2 32 21 - 32 mmol/L    Anion Gap 6 5 - 15 mmol/L    Glucose 133 (H) 65 - 100 mg/dL    BUN 98 (H) 6 - 20 MG/DL    Creatinine 2.83 (H) 0.55 - 1.02 MG/DL    BUN/Creatinine Ratio 35 (H) 12 - 20      Est, Glom Filt Rate 15 (L) >60 ml/min/1.73m2    Calcium 10.2 (H) 8.5 - 10.1 MG/DL    Total Bilirubin 0.8 0.2 - 1.0 MG/DL    ALT 47 12 - 78 U/L    AST 77 (H) 15 - 37 U/L    Alk Phosphatase 123 (H) 45 - 117 U/L    Total Protein 7.3 6.4 - 8.2 g/dL    Albumin 3.0 (L) 3.5 - 5.0 g/dL    Globulin 4.3 (H) 2.0 - 4.0 g/dL    Albumin/Globulin Ratio 0.7 (L) 1.1 - 2.2     Magnesium    Collection Time: 24  1:23 PM   Result Value Ref Range    Magnesium 2.2 1.6 - 2.4 mg/dL       Medications given:   Medications Administered         0.9 % sodium chloride infusion Admin Date  2024 Action  New Bag Dose  25 mL/hr Rate  25 mL/hr Route  IntraVENous Administered By  Jad  Benjie MÉNDEZ RN        furosemide (LASIX) injection 80 mg Admin Date  05/20/2024 Action  Given Dose  80 mg Rate   Route  IntraVENous Administered By  Benjie Street RN        iron sucrose (VENOFER) 300 mg in sodium chloride 0.9 % 250 mL IVPB Admin Date  05/20/2024 Action  New Bag Dose  300 mg Rate  193.3 mL/hr Route  IntraVENous Administered By  Benjie Street RN          Ms. Lu tolerated the infusion, and had no complaints.    [facility]. Lu [facility] in [facility] condition.     Future Appointments   Date Time Provider Department Center   6/3/2024 11:00 AM PEDS MED INF CHAIR 1 [facility]   6/4/2024  3:00 PM PEDS FASTTRACK 1 [facility]   6/5[facility]2024  [facility]   6/6/2024  3:00 PM PEDS FASTTRACK 1 [facility]       BENJIE STREET RN  May 20, 2024  4:41 PM

## 2024-05-22 DIAGNOSIS — L03.116 CELLULITIS OF LEFT LOWER EXTREMITY: ICD-10-CM

## 2024-05-29 DIAGNOSIS — L03.116 CELLULITIS OF LEFT LOWER EXTREMITY: ICD-10-CM

## 2024-06-03 ENCOUNTER — HOSPITAL ENCOUNTER (OUTPATIENT)
Facility: HOSPITAL | Age: 89
Setting detail: INFUSION SERIES
Discharge: HOME | End: 2024-06-03
Payer: MEDICARE

## 2024-06-03 VITALS
RESPIRATION RATE: 18 BRPM | SYSTOLIC BLOOD PRESSURE: 112 MMHG | TEMPERATURE: 97.9 F | DIASTOLIC BLOOD PRESSURE: 67 MMHG | HEART RATE: 60 BPM | OXYGEN SATURATION: 98 %

## 2024-06-03 DIAGNOSIS — R60.9 EDEMA, UNSPECIFIED TYPE: ICD-10-CM

## 2024-06-03 DIAGNOSIS — D50.9 IRON DEFICIENCY ANEMIA, UNSPECIFIED IRON DEFICIENCY ANEMIA TYPE: Primary | ICD-10-CM

## 2024-06-03 PROCEDURE — 96365 THER/PROPH/DIAG IV INF INIT: CPT

## 2024-06-03 PROCEDURE — 96375 TX/PRO/DX INJ NEW DRUG ADDON: CPT

## 2024-06-03 PROCEDURE — 96366 THER/PROPH/DIAG IV INF ADDON: CPT

## 2024-06-03 PROCEDURE — 2580000003 HC RX 258: Performed by: INTERNAL MEDICINE

## 2024-06-03 PROCEDURE — 6360000002 HC RX W HCPCS: Performed by: INTERNAL MEDICINE

## 2024-06-03 RX ORDER — FUROSEMIDE 10 MG/ML
80 INJECTION INTRAMUSCULAR; INTRAVENOUS ONCE
Status: COMPLETED | OUTPATIENT
Start: 2024-06-03 | End: 2024-06-03

## 2024-06-03 RX ORDER — SODIUM CHLORIDE 9 MG/ML
5-250 INJECTION, SOLUTION INTRAVENOUS PRN
Status: DISCONTINUED | OUTPATIENT
Start: 2024-06-03 | End: 2024-06-04 | Stop reason: HOSPADM

## 2024-06-03 RX ORDER — FUROSEMIDE 10 MG/ML
80 INJECTION INTRAMUSCULAR; INTRAVENOUS ONCE
Status: CANCELLED
Start: 2024-06-04 | End: 2024-06-04

## 2024-06-03 RX ORDER — SODIUM CHLORIDE 9 MG/ML
5-250 INJECTION, SOLUTION INTRAVENOUS PRN
OUTPATIENT
Start: 2024-06-03

## 2024-06-03 RX ADMIN — SODIUM CHLORIDE 25 ML/HR: 9 INJECTION, SOLUTION INTRAVENOUS at 11:23

## 2024-06-03 RX ADMIN — FUROSEMIDE 80 MG: 10 INJECTION, SOLUTION INTRAMUSCULAR; INTRAVENOUS at 14:30

## 2024-06-03 RX ADMIN — SODIUM CHLORIDE 400 MG: 9 INJECTION, SOLUTION INTRAVENOUS at 11:44

## 2024-06-03 ASSESSMENT — PAIN SCALES - GENERAL: PAINLEVEL_OUTOF10: 0

## 2024-06-03 NOTE — PROGRESS NOTES
[facility] Peds/Adult Note                       Date: Jane 3, 2024    Name: Vangie Lu    MRN: [id number]         : 10/5/1929    1100 Patient arrives for IV Venofer followed by IV Lasix without acute problems. Please see Epic for complete assessment and education provided.    Vital signs stable throughout and prior to discharge. Patient tolerated procedure well and was discharged without incident.  Patient/Daughter is aware of next [facility] appointment on 2024. Appointment card give to the Patient/Daughter.       Ms. Lu's vitals were reviewed prior to and after treatment.   Patient Vitals for the past 12 hrs:   Temp Pulse Resp BP SpO2   24 1428 -- 60 18 112/67 --   24 1320 97.9 °F (36.6 °C) 62 20 113/74 98 %   24 1134 -- 66 18 112/65 --   24 1100 98.1 °F (36.7 °C) 74 18 113/66 97 %       Medications given:   Medications Administered         0.9 % sodium chloride infusion Admin Date  2024 Action  New Bag Dose  25 mL/hr Rate  25 mL/hr Route  IntraVENous Administered By  Benjie Colorado RN        furosemide (LASIX) injection 80 mg Admin Date  2024 Action  Given Dose  80 mg Rate   Route  IntraVENous Administered By  Benjie Colorado RN        iron sucrose (VENOFER) 400 mg in sodium chloride 0.9 % 250 mL IVPB Admin Date  2024 Action  New Bag Dose  400 mg Rate  118 mL/hr Route  IntraVENous Administered By  Benjie Colorado RN          Ms. Lu tolerated the infusion, and had no complaints.    Ms. Lu was [facility] in stable condition.     Future Appointments   Date Time Provider Department Center   2024  3:00 PM PEDS FASTTRACK 1 [facility]   2024  3:30 PM PEDS FASTTRACK 1 [facility]   2024  3:00 PM PEDS FASTTRACK 1 [facility]       BENJIE COLORADO RN  Jane 3, 2024  4:19 PM

## 2024-06-04 ENCOUNTER — HOSPITAL ENCOUNTER (OUTPATIENT)
Facility: HOSPITAL | Age: 89
Setting detail: INFUSION SERIES
Discharge: HOME | End: 2024-06-04
Payer: MEDICARE

## 2024-06-04 VITALS
HEART RATE: 78 BPM | OXYGEN SATURATION: 97 % | DIASTOLIC BLOOD PRESSURE: 59 MMHG | TEMPERATURE: 97.8 F | RESPIRATION RATE: 18 BRPM | SYSTOLIC BLOOD PRESSURE: 105 MMHG

## 2024-06-04 DIAGNOSIS — R60.9 EDEMA, UNSPECIFIED TYPE: Primary | ICD-10-CM

## 2024-06-04 PROCEDURE — 2580000003 HC RX 258: Performed by: INTERNAL MEDICINE

## 2024-06-04 PROCEDURE — 6360000002 HC RX W HCPCS: Performed by: INTERNAL MEDICINE

## 2024-06-04 PROCEDURE — 96374 THER/PROPH/DIAG INJ IV PUSH: CPT

## 2024-06-04 RX ORDER — FUROSEMIDE 10 MG/ML
80 INJECTION INTRAMUSCULAR; INTRAVENOUS ONCE
Status: CANCELLED
Start: 2024-06-04 | End: 2024-06-04

## 2024-06-04 RX ORDER — FUROSEMIDE 10 MG/ML
80 INJECTION INTRAMUSCULAR; INTRAVENOUS ONCE
Status: COMPLETED | OUTPATIENT
Start: 2024-06-04 | End: 2024-06-04

## 2024-06-04 RX ORDER — SODIUM CHLORIDE 0.9 % (FLUSH) 0.9 %
10 SYRINGE (ML) INJECTION PRN
Status: DISCONTINUED | OUTPATIENT
Start: 2024-06-04 | End: 2024-06-05 | Stop reason: HOSPADM

## 2024-06-04 RX ADMIN — FUROSEMIDE 80 MG: 10 INJECTION, SOLUTION INTRAMUSCULAR; INTRAVENOUS at 15:23

## 2024-06-04 RX ADMIN — SODIUM CHLORIDE, PRESERVATIVE FREE 10 ML: 5 INJECTION INTRAVENOUS at 15:28

## 2024-06-04 RX ADMIN — SODIUM CHLORIDE, PRESERVATIVE FREE 10 ML: 5 INJECTION INTRAVENOUS at 15:23

## 2024-06-04 ASSESSMENT — PAIN SCALES - GENERAL
PAINLEVEL_OUTOF10: 0
PAINLEVEL_OUTOF10: 0

## 2024-06-04 NOTE — PROGRESS NOTES
[facility] Peds/Adult Note                       Date: 2024    Name: Vangie Lu    MRN: [id number]         : 10/5/1929    1500 Patient arrives for Lasix IVP without acute problems. Please see Epic for complete assessment and education provided.    Vital signs stable throughout and prior to discharge. Patient tolerated procedure well and was discharged without incident.  Patient/daughter are aware of no further [facility] appointments @ this time & to follow up with healthcare provider for next appointment.       Ms. Lu's vitals were reviewed prior to and after treatment.   Patient Vitals for the past 12 hrs:   Temp Pulse Resp BP SpO2   24 1530 -- 78 18 (!) 105/59 --   24 1500 97.8 °F (36.6 °C) 89 18 (!) 90/55 97 %       Medications given:   Medications Administered         furosemide (LASIX) injection 80 mg Admin Date  2024 Action  Given Dose  80 mg Route  IntraVENous Administered By  Benjie Colorado RN        sodium chloride flush 0.9 % injection 10 mL Admin Date  2024 Action  Given Dose  10 mL Route  IntraVENous Administered By  Benjie Colorado RN        sodium chloride flush 0.9 % injection 10 mL Admin Date  2024 Action  Given Dose  10 mL Route  IntraVENous Administered By  Benjie Colorado RN          Ms. Lu tolerated the infusion, and had no complaints.    Ms. Lu was [facility] in stable condition.     No future appointments.    BENJIE COLORADO RN  2024  4:15 PM

## 2024-07-08 ENCOUNTER — HOME HEALTH ADMISSION (OUTPATIENT)
Dept: HOME HEALTH SERVICES | Facility: HOME HEALTH | Age: 89
End: 2024-07-08
Payer: MEDICARE

## 2024-07-08 ENCOUNTER — OFFICE VISIT (OUTPATIENT)
Age: 89
End: 2024-07-08
Payer: MEDICARE

## 2024-07-08 VITALS
TEMPERATURE: 97.7 F | HEIGHT: 63 IN | SYSTOLIC BLOOD PRESSURE: 124 MMHG | HEART RATE: 68 BPM | WEIGHT: 154.8 LBS | OXYGEN SATURATION: 97 % | RESPIRATION RATE: 16 BRPM | DIASTOLIC BLOOD PRESSURE: 74 MMHG | BODY MASS INDEX: 27.43 KG/M2

## 2024-07-08 DIAGNOSIS — L03.116 CELLULITIS OF LEFT LOWER EXTREMITY: ICD-10-CM

## 2024-07-08 DIAGNOSIS — Z00.00 ENCOUNTER FOR MEDICARE ANNUAL WELLNESS EXAM: Primary | ICD-10-CM

## 2024-07-08 DIAGNOSIS — R60.0 BILATERAL LEG EDEMA: ICD-10-CM

## 2024-07-08 PROCEDURE — G0439 PPPS, SUBSEQ VISIT: HCPCS | Performed by: FAMILY MEDICINE

## 2024-07-08 PROCEDURE — G8427 DOCREV CUR MEDS BY ELIG CLIN: HCPCS | Performed by: FAMILY MEDICINE

## 2024-07-08 PROCEDURE — 99214 OFFICE O/P EST MOD 30 MIN: CPT | Performed by: FAMILY MEDICINE

## 2024-07-08 PROCEDURE — G8417 CALC BMI ABV UP PARAM F/U: HCPCS | Performed by: FAMILY MEDICINE

## 2024-07-08 PROCEDURE — PBSHW PBB SHADOW CHARGE: Performed by: FAMILY MEDICINE

## 2024-07-08 PROCEDURE — 1090F PRES/ABSN URINE INCON ASSESS: CPT | Performed by: FAMILY MEDICINE

## 2024-07-08 PROCEDURE — 1123F ACP DISCUSS/DSCN MKR DOCD: CPT | Performed by: FAMILY MEDICINE

## 2024-07-08 PROCEDURE — 1036F TOBACCO NON-USER: CPT | Performed by: FAMILY MEDICINE

## 2024-07-08 RX ORDER — DOXYCYCLINE 100 MG/1
100 CAPSULE ORAL 2 TIMES DAILY
Qty: 14 CAPSULE | Refills: 0 | Status: SHIPPED | OUTPATIENT
Start: 2024-07-08 | End: 2024-07-15

## 2024-07-08 RX ORDER — CEFTRIAXONE 1 G/1
1000 INJECTION, POWDER, FOR SOLUTION INTRAMUSCULAR; INTRAVENOUS ONCE
Status: COMPLETED | OUTPATIENT
Start: 2024-07-08 | End: 2024-07-08

## 2024-07-08 RX ADMIN — CEFTRIAXONE SODIUM 1000 MG: 1 INJECTION, POWDER, FOR SOLUTION INTRAMUSCULAR; INTRAVENOUS at 09:48

## 2024-07-08 ASSESSMENT — ENCOUNTER SYMPTOMS
COUGH: 0
WHEEZING: 0
ABDOMINAL PAIN: 0
NAUSEA: 0
SHORTNESS OF BREATH: 0
CONSTIPATION: 0
DIARRHEA: 0
CHEST TIGHTNESS: 0
VOMITING: 0

## 2024-07-08 ASSESSMENT — LIFESTYLE VARIABLES
HOW OFTEN DO YOU HAVE A DRINK CONTAINING ALCOHOL: NEVER
HOW MANY STANDARD DRINKS CONTAINING ALCOHOL DO YOU HAVE ON A TYPICAL DAY: PATIENT DOES NOT DRINK

## 2024-07-08 ASSESSMENT — PATIENT HEALTH QUESTIONNAIRE - PHQ9
SUM OF ALL RESPONSES TO PHQ QUESTIONS 1-9: 0
SUM OF ALL RESPONSES TO PHQ QUESTIONS 1-9: 0
2. FEELING DOWN, DEPRESSED OR HOPELESS: NOT AT ALL
SUM OF ALL RESPONSES TO PHQ QUESTIONS 1-9: 0
SUM OF ALL RESPONSES TO PHQ QUESTIONS 1-9: 0
SUM OF ALL RESPONSES TO PHQ9 QUESTIONS 1 & 2: 0
1. LITTLE INTEREST OR PLEASURE IN DOING THINGS: NOT AT ALL

## 2024-07-08 NOTE — PROGRESS NOTES
Chief Complaint   Patient presents with    Wound Check     On left leg; about 1 month     Medicare AWV     \"Have you been to the ER, urgent care clinic since your last visit?  Hospitalized since your last visit?\"    Yes. 5/24- wound- neal doctor.     “Have you seen or consulted any other health care providers outside of [facility] since your last visit?”    NO         Financial Resource Strain: Low Risk  (5/10/2024)    Overall Financial Resource Strain (CARDIA)     Difficulty of Paying Living Expenses: Not hard at all      Food Insecurity: No Food Insecurity (5/10/2024)    Hunger Vital Sign     Worried About Running Out of Food in the Last Year: Never true     Ran Out of Food in the Last Year: Never true            7/8/2024     8:26 AM   PHQ-9    Little interest or pleasure in doing things 0   Feeling down, depressed, or hopeless 0   PHQ-2 Score 0   PHQ-9 Total Score 0       Health Maintenance Due   Topic Date Due    Hepatitis A vaccine (1 of 2 - Risk 2-dose series) Never done    Shingles vaccine (1 of 2) Never done    Hepatitis B vaccine (1 of 3 - Risk 3-dose series) Never done    Respiratory Syncytial Virus (RSV) Pregnant or age 60 yrs+ (1 - 1-dose 60+ series) Never done    Pneumococcal 65+ years Vaccine (2 of 2 - PPSV23 or PCV20) 05/11/2017    COVID-19 Vaccine (4 - 2023-24 season) 09/01/2023    Annual Wellness Visit (Medicare)  11/27/2023

## 2024-07-08 NOTE — PROGRESS NOTES
Patient Name: Vangie Lu   MRN: [id number]    ASSESSMENT AND PLAN  Vangie Lu is a 89 y.o. female who presents today for:    1. Encounter for Medicare annual wellness exam    2. Cellulitis of left lower extremity  Will obtain IM Rocephin today in office and add 7 more days of doxycycline; Review to take with food and probiotic/yogurt daily while on abx.   Also will refer to outpatient wound care clinic and  nursing.  Reviewed signs and symptoms that would indicate a worsening medical condition which would require immediate evaluation and treatment; patient expressed understanding of plan.  - [facility] - Outpatient Wound Care [facility] ([facility])  - silver sulfADIAZINE (SILVADENE) 1 % cream; Apply topically daily.  Dispense: 50 g; Refill: 2  - mupirocin (BACTROBAN) 2 % ointment; Apply topically 3 times daily for 7 days  Dispense: 22 g; Refill: 0  - Culture, Anaerobic and Aerobic; Future  - cefTRIAXone (ROCEPHIN) injection 1,000 mg  - doxycycline monohydrate (MONODOX) 100 MG capsule; Take 1 capsule by mouth 2 times daily for 7 days  Dispense: 14 capsule; Refill: 0  - [facility]  - Culture, Anaerobic and Aerobic    3. Bilateral leg edema  Recommend  wound care.  - [facility]      Orders Placed This Encounter   Medications    silver sulfADIAZINE (SILVADENE) 1 % cream     Sig: Apply topically daily.     Dispense:  50 g     Refill:  2    mupirocin (BACTROBAN) 2 % ointment     Sig: Apply topically 3 times daily for 7 days     Dispense:  22 g     Refill:  0    cefTRIAXone (ROCEPHIN) injection 1,000 mg     Order Specific Question:   Antimicrobial Indications     Answer:   Skin and Soft Tissue Infection     Order Specific Question:   Skin duration of therapy     Answer:   7 days    doxycycline monohydrate (MONODOX) 100 MG capsule     Sig: Take 1 capsule by mouth 2 times daily for 7 days     Dispense:  14 capsule     Refill:  0

## 2024-07-08 NOTE — PROGRESS NOTES
Medicare Annual Wellness Visit    Vangie Lu is here for Wound Check (On left leg; about 1 month ) and Medicare AWV    Assessment & Plan   Encounter for Medicare annual wellness exam  Cellulitis of left lower extremity  -     [facility] - Outpatient Wound Care [facility] ([facility])  -     silver sulfADIAZINE (SILVADENE) 1 % cream; Apply topically daily., Disp-50 g, R-2, Normal  -     mupirocin (BACTROBAN) 2 % ointment; Apply topically 3 times daily for 7 days, Topical, 3 TIMES DAILY Starting Mon 7/8/2024, Until Mon 7/15/2024, For 7 days, Disp-22 g, R-0, Normal  -     Culture, Anaerobic and Aerobic; Future  -     cefTRIAXone (ROCEPHIN) injection 1,000 mg; 1,000 mg, IntraMUSCular, ONCE, 1 dose, On Mon 7/8/24 at 0930Antimicrobial Indications: Skin and Soft Tissue InfectionSkin duration of therapy: 7 daysReconstitute with 3.6 mL 1% lidocaine or sterile water.  -     doxycycline monohydrate (MONODOX) 100 MG capsule; Take 1 capsule by mouth 2 times daily for 7 days, Disp-14 capsule, R-0Normal  -     [facility]  Bilateral leg edema  -     [facility]     Recommendations for Preventive Services Due: see orders and patient instructions/AVS.  Recommended screening schedule for the next 5-10 years is provided to the patient in written form: see Patient Instructions/AVS.     Return in about 1 year (around 7/8/2025) for AWV.     Subjective     Patient's complete Health Risk Assessment and screening values have been reviewed and are found in Flowsheets. The following problems were reviewed today and where indicated follow up appointments were made and/or referrals ordered.    Positive Risk Factor Screenings with Interventions:              Self-assessment of health:  In general, how would you say your health is?: (!) Poor  Interventions:  Patient declines any further evaluation or treatment    General HRA Questions:  Select all that apply: (!) New or Increased

## 2024-07-13 ENCOUNTER — HOME CARE VISIT (OUTPATIENT)
Facility: HOME HEALTH | Age: 89
End: 2024-07-13

## 2024-07-13 PROCEDURE — G0299 HHS/HOSPICE OF RN EA 15 MIN: HCPCS

## 2024-07-13 PROCEDURE — 0221000100 HH NO PAY CLAIM PROCEDURE

## 2024-07-14 NOTE — HOME HEALTH
Reason for referral, PMH SUMMARY of clinical condition: Patient is a 89 y/o with PMH of CHF, Edema in lower extremities who went to PCP with Cellulitis of left lower extremity. and given IM injection antibitoic and started on oral antibitotic for 7 days. Patient admitted to home care for wound care.     Clinical Assessment/Skilled reason for admission to home health- Patient does not speak english and has cognitive impairment that daughter states has been worse over the last 3 months. Patient ambulates with a walker and assistance of sigrid secodnary to edema in lower leg and history of a fall. Caregiver refused pt/ot evaluation . She gives bath and assists with all care needs. Patient to start at wound clinic 7/22/24. Discussed wiht medicaid may be be able to get a personal care aide    Functional Mobility:  Bed Mobility (rolling/scooting): Minimal /Moderate Assistance (requires assistance with less than 50% of the effort)  Transfers (supine to chair and return to supine): Minimal /Moderate Assistance (requires assistance with less than 50% of the effort).  Patient uses device: yes  Gait:  Ambulates with moderate assistance using a walker  Safety concerns with mobility include: impaired balance  and impaired judgement  DME: walker    Diagnosis: CHF, Edema    Subjective   Caregiver: Divine Frank    Medications reconciled and all medications are available in the home this visit. The following education was provided regarding medications: medication interactions and look alike medications:  Patient/CG able to demonstrate knowledge through teach back with 98 percent accuracy.      notified of any discrepancies/medication interactions .       A list of reconciled medications has been uploaded to media.    High risk med teaching was performed on High alert medication teaching on Doxycycline, Antibiotic therapy education Purpose, dose, and frequency    Patient at risk for falls Yes:   Recommended requesting PT/OT

## 2024-07-15 ENCOUNTER — HOME CARE VISIT (OUTPATIENT)
Facility: HOME HEALTH | Age: 89
End: 2024-07-15

## 2024-07-15 VITALS
DIASTOLIC BLOOD PRESSURE: 82 MMHG | TEMPERATURE: 97.6 F | RESPIRATION RATE: 16 BRPM | OXYGEN SATURATION: 97 % | HEART RATE: 78 BPM | SYSTOLIC BLOOD PRESSURE: 130 MMHG

## 2024-07-15 PROCEDURE — G0300 HHS/HOSPICE OF LPN EA 15 MIN: HCPCS

## 2024-07-15 ASSESSMENT — ENCOUNTER SYMPTOMS
BOWEL INCONTINENCE: 1
DYSPNEA ACTIVITY LEVEL: AFTER AMBULATING LESS THAN 10 FT

## 2024-07-16 VITALS
SYSTOLIC BLOOD PRESSURE: 124 MMHG | TEMPERATURE: 97.7 F | OXYGEN SATURATION: 98 % | DIASTOLIC BLOOD PRESSURE: 68 MMHG | RESPIRATION RATE: 18 BRPM | HEART RATE: 77 BPM

## 2024-07-16 NOTE — HOME HEALTH
Subjective: Per daughter, pt in bathroom. Pt ambulated slowly with walker to chair beside bed with no assistance  Falls since last visit No(if yes complete the Fall Tracking Form and include bsrifallreport):   Caregiver involvement changes: no  Home health supplies by type and quantity ordered/delivered this visit include: no    Clinician asked if patient has had any physician contact since last home care visit and patient states: NO  Clinician asked if patient has any new or changed medications and patient states:  NO   If Yes, were medications reconciled? NO   Was the certifying physician notified of changes in medications? NO     Pt with cellulitis/lymphedema to ble. Pt lives with daughter who assisted pt to shower last night and replaced dressings independantly to pt's legs. This nurse unwrapped ace wraps from daughter's stock and cleaned ble with wound cleanser, applied lotion around wounds to left leg and to entire rle, placed alginate w/ag to lle redness and rewrapped with clean/old ace wraps from dtr. Supplies have not come to home yet. Pt in need of SN for wound care and assist in keeping pt out of hospital. Plan for next visit is wound care to ble

## 2024-07-18 ENCOUNTER — HOME CARE VISIT (OUTPATIENT)
Facility: HOME HEALTH | Age: 89
End: 2024-07-18
Payer: MEDICARE

## 2024-07-18 PROCEDURE — G0300 HHS/HOSPICE OF LPN EA 15 MIN: HCPCS

## 2024-07-19 VITALS
SYSTOLIC BLOOD PRESSURE: 124 MMHG | DIASTOLIC BLOOD PRESSURE: 80 MMHG | RESPIRATION RATE: 18 BRPM | HEART RATE: 80 BPM | OXYGEN SATURATION: 97 % | TEMPERATURE: 98 F

## 2024-07-20 ENCOUNTER — HOME CARE VISIT (OUTPATIENT)
Facility: HOME HEALTH | Age: 89
End: 2024-07-20

## 2024-07-20 VITALS
OXYGEN SATURATION: 96 % | DIASTOLIC BLOOD PRESSURE: 64 MMHG | HEART RATE: 80 BPM | SYSTOLIC BLOOD PRESSURE: 112 MMHG | TEMPERATURE: 97.2 F | RESPIRATION RATE: 18 BRPM

## 2024-07-20 PROCEDURE — G0299 HHS/HOSPICE OF RN EA 15 MIN: HCPCS

## 2024-07-20 ASSESSMENT — ENCOUNTER SYMPTOMS
DYSPNEA ACTIVITY LEVEL: AFTER AMBULATING 10 - 20 FT
STOOL DESCRIPTION: FORMED

## 2024-07-20 NOTE — HOME HEALTH
Subjective: caregiver reports wound care appt monday  Falls since last visit No(if yes complete the Fall Tracking Form and include bsrifallreport):   Caregiver involvement changes: yes  Home health supplies by type and quantity ordered/delivered this visit include: na    Clinician asked if patient has had any physician contact since last home care visit and patient states: NO  Clinician asked if patient has any new or changed medications and patient states:  NO   If Yes, were medications reconciled? N/A   Was the certifying physician notified of changes in medications? N/A     Clinical assessment (what this visit means for the patient overall and need for ongoing skilled care) and progress or lack of progress towards SPECIFIC goals: ernst will continue to benefit from skilled nursing services for cardiac teaching and lymphedema teaching and skin prevention    Written Teaching Material Utilized: N/A    Interdisciplinary communication with: N/A     Discharge planning as follows: When goals are met    Specific plan for next visit: cardiac teaching skin assessment

## 2024-07-23 ENCOUNTER — HOME CARE VISIT (OUTPATIENT)
Facility: HOME HEALTH | Age: 89
End: 2024-07-23
Payer: MEDICARE

## 2024-07-23 ENCOUNTER — TELEPHONE (OUTPATIENT)
Age: 89
End: 2024-07-23

## 2024-07-23 VITALS
HEART RATE: 73 BPM | RESPIRATION RATE: 20 BRPM | DIASTOLIC BLOOD PRESSURE: 73 MMHG | OXYGEN SATURATION: 99 % | TEMPERATURE: 97.4 F | SYSTOLIC BLOOD PRESSURE: 111 MMHG

## 2024-07-23 PROCEDURE — G0300 HHS/HOSPICE OF LPN EA 15 MIN: HCPCS

## 2024-07-23 ASSESSMENT — ENCOUNTER SYMPTOMS: PAIN LOCATION - PAIN QUALITY: HURTS

## 2024-07-23 NOTE — TELEPHONE ENCOUNTER
Called Patient. Spoke with daughter. Name and  confirmed.  Informed her that patient needs urgent evaluation today. Verbalized understanding.    She stated that nephrologist is managing her edema very closely.

## 2024-07-23 NOTE — TELEPHONE ENCOUNTER
KATHERINE from [facility] called stated patient has 4+ edema of both of her lower legs.  She sleep in recylinder and sit 24 hrs.    Requesting a call back    Best call back #[phone]

## 2024-07-23 NOTE — HOME HEALTH
Subjective: her legs are so swollen, but she cannot elevate because she cannot get recliner to go down so she can get to bathroom. Sits in recliner 24/7/365  Falls since last visit No(if yes complete the Fall Tracking Form and include bsrifallreport):   Caregiver involvement changes: no  Home health supplies by type and quantity ordered/delivered this visit include: no    Clinician asked if patient has had any physician contact since last home care visit and patient states: NO  Clinician asked if patient has any new or changed medications and patient states:  NO   If Yes, were medications reconciled? NO   Was the certifying physician notified of changes in medications? NO     Clinical assessment (what this visit means for the patient overall and need for ongoing skilled care) and progress or lack of progress towards SPECIFIC goals: pt with multiple comorbidities and wound to rle. Pt with increased swelling (4+ pitting edema) to ble. This nurse contacted Dr Baldwin's office and notified of this. Awaiting call back from nurse. Wound healing well. Daughter performing dsg changes on days sn is not in home and is 100% accurate.     Written Teaching Material Utilized: N/A    Interdisciplinary communication with: Dr Baldwin Physician for the purpose of POC collaboration    Discharge planning as follows: When wound is 100% healed    Specific plan for next visit: monitor swelling/CHF; wound care

## 2024-07-25 ENCOUNTER — HOME CARE VISIT (OUTPATIENT)
Facility: HOME HEALTH | Age: 89
End: 2024-07-25
Payer: MEDICARE

## 2024-07-25 PROCEDURE — G0300 HHS/HOSPICE OF LPN EA 15 MIN: HCPCS

## 2024-07-25 RX ORDER — HEPARIN 100 UNIT/ML
500 SYRINGE INTRAVENOUS PRN
Status: CANCELLED | OUTPATIENT
Start: 2024-07-26

## 2024-07-26 ENCOUNTER — HOSPITAL ENCOUNTER (OUTPATIENT)
Facility: HOSPITAL | Age: 89
Setting detail: INFUSION SERIES
Discharge: HOME | End: 2024-07-26
Payer: MEDICARE

## 2024-07-26 VITALS
HEART RATE: 68 BPM | RESPIRATION RATE: 20 BRPM | SYSTOLIC BLOOD PRESSURE: 109 MMHG | DIASTOLIC BLOOD PRESSURE: 67 MMHG | TEMPERATURE: 97.8 F | OXYGEN SATURATION: 97 %

## 2024-07-26 DIAGNOSIS — R60.9 EDEMA, UNSPECIFIED TYPE: Primary | ICD-10-CM

## 2024-07-26 PROCEDURE — 6360000002 HC RX W HCPCS: Performed by: INTERNAL MEDICINE

## 2024-07-26 PROCEDURE — 2580000003 HC RX 258: Performed by: INTERNAL MEDICINE

## 2024-07-26 PROCEDURE — 96374 THER/PROPH/DIAG INJ IV PUSH: CPT

## 2024-07-26 RX ORDER — SODIUM CHLORIDE 0.9 % (FLUSH) 0.9 %
5-40 SYRINGE (ML) INJECTION PRN
Status: DISCONTINUED | OUTPATIENT
Start: 2024-07-26 | End: 2024-07-27 | Stop reason: HOSPADM

## 2024-07-26 RX ORDER — SODIUM CHLORIDE 9 MG/ML
5-250 INJECTION, SOLUTION INTRAVENOUS PRN
Status: CANCELLED | OUTPATIENT
Start: 2024-07-29

## 2024-07-26 RX ORDER — SODIUM CHLORIDE 9 MG/ML
5-250 INJECTION, SOLUTION INTRAVENOUS PRN
Status: DISCONTINUED | OUTPATIENT
Start: 2024-07-26 | End: 2024-07-27 | Stop reason: HOSPADM

## 2024-07-26 RX ORDER — HEPARIN 100 UNIT/ML
500 SYRINGE INTRAVENOUS PRN
Status: CANCELLED | OUTPATIENT
Start: 2024-07-29

## 2024-07-26 RX ORDER — SODIUM CHLORIDE 0.9 % (FLUSH) 0.9 %
5-40 SYRINGE (ML) INJECTION PRN
Status: CANCELLED | OUTPATIENT
Start: 2024-07-29

## 2024-07-26 RX ADMIN — SODIUM CHLORIDE, PRESERVATIVE FREE 10 ML: 5 INJECTION INTRAVENOUS at 11:40

## 2024-07-26 RX ADMIN — SODIUM CHLORIDE 20 ML/HR: 9 INJECTION, SOLUTION INTRAVENOUS at 11:44

## 2024-07-26 RX ADMIN — FUROSEMIDE 80 MG: 10 INJECTION, SOLUTION INTRAMUSCULAR; INTRAVENOUS at 11:53

## 2024-07-26 ASSESSMENT — PAIN SCALES - GENERAL
PAINLEVEL_OUTOF10: 0
PAINLEVEL_OUTOF10: 0

## 2024-07-26 NOTE — PROGRESS NOTES
[facility] Peds/Adult Note                       Date: 2024    Name: Vangie Lu    MRN: [id number]         : 10/5/1929    1130 Patient arrives for Lasix IVP without acute problems. Please see Epic for complete assessment and education provided.    Vital signs stable throughout and prior to discharge. Patient tolerated procedure well and was discharged without incident.  Patient/Daughter is aware of next [facility] appointment on 2024.  Appointment card give to the Patient/Daughter.       Ms. Lu's vitals were reviewed prior to and after treatment.   Patient Vitals for the past 12 hrs:   Temp Pulse Resp BP SpO2   24 1159 -- 68 20 109/67 --   24 1130 97.8 °F (36.6 °C) 75 20 109/68 97 %         Medications given:   Medications Administered         0.9 % sodium chloride infusion Admin Date  2024 Action  New Bag Dose  20 mL/hr Rate  20 mL/hr Route  IntraVENous Administered By  Benjie Colorado RN        furosemide (LASIX) 80 mg in sodium chloride 0.9 % 63 mL IVPB Admin Date  2024 Action  New Bag Dose  80 mg Rate  756 mL/hr Route  IntraVENous Administered By  Benjie Colorado RN        sodium chloride flush 0.9 % injection 5-40 mL Admin Date  2024 Action  Given Dose  10 mL Rate   Route  IntraVENous Administered By  Benjie Colorado RN          Ms. Lu tolerated the infusion, and had no complaints.    [facility] condition.     BENJIE COLORADO RN  2024  2:22 PM

## 2024-07-28 VITALS
OXYGEN SATURATION: 92 % | TEMPERATURE: 98 F | RESPIRATION RATE: 20 BRPM | SYSTOLIC BLOOD PRESSURE: 117 MMHG | HEART RATE: 73 BPM | WEIGHT: 172 LBS | BODY MASS INDEX: 30.47 KG/M2 | DIASTOLIC BLOOD PRESSURE: 69 MMHG

## 2024-07-29 ENCOUNTER — HOSPITAL ENCOUNTER (OUTPATIENT)
Facility: HOSPITAL | Age: 89
Setting detail: INFUSION SERIES
Discharge: HOME | End: 2024-07-29
Payer: MEDICARE

## 2024-07-29 VITALS
TEMPERATURE: 98.4 F | SYSTOLIC BLOOD PRESSURE: 104 MMHG | RESPIRATION RATE: 20 BRPM | HEART RATE: 82 BPM | OXYGEN SATURATION: 97 % | DIASTOLIC BLOOD PRESSURE: 69 MMHG

## 2024-07-29 DIAGNOSIS — R60.9 EDEMA, UNSPECIFIED TYPE: Primary | ICD-10-CM

## 2024-07-29 PROCEDURE — 2580000003 HC RX 258: Performed by: INTERNAL MEDICINE

## 2024-07-29 PROCEDURE — 6360000002 HC RX W HCPCS: Performed by: INTERNAL MEDICINE

## 2024-07-29 PROCEDURE — 96374 THER/PROPH/DIAG INJ IV PUSH: CPT

## 2024-07-29 RX ORDER — SODIUM CHLORIDE 0.9 % (FLUSH) 0.9 %
5-40 SYRINGE (ML) INJECTION PRN
Status: DISCONTINUED | OUTPATIENT
Start: 2024-07-29 | End: 2024-07-30 | Stop reason: HOSPADM

## 2024-07-29 RX ORDER — SODIUM CHLORIDE 9 MG/ML
5-250 INJECTION, SOLUTION INTRAVENOUS PRN
Status: CANCELLED | OUTPATIENT
Start: 2024-08-01

## 2024-07-29 RX ORDER — HEPARIN 100 UNIT/ML
500 SYRINGE INTRAVENOUS PRN
OUTPATIENT
Start: 2024-08-01

## 2024-07-29 RX ORDER — SODIUM CHLORIDE 0.9 % (FLUSH) 0.9 %
5-40 SYRINGE (ML) INJECTION PRN
Status: CANCELLED | OUTPATIENT
Start: 2024-08-01

## 2024-07-29 RX ORDER — SODIUM CHLORIDE 9 MG/ML
5-250 INJECTION, SOLUTION INTRAVENOUS PRN
Status: DISCONTINUED | OUTPATIENT
Start: 2024-07-29 | End: 2024-07-30 | Stop reason: HOSPADM

## 2024-07-29 RX ADMIN — FUROSEMIDE 80 MG: 10 INJECTION, SOLUTION INTRAMUSCULAR; INTRAVENOUS at 14:51

## 2024-07-29 RX ADMIN — SODIUM CHLORIDE, PRESERVATIVE FREE 10 ML: 5 INJECTION INTRAVENOUS at 14:21

## 2024-07-29 RX ADMIN — SODIUM CHLORIDE 20 ML/HR: 9 INJECTION, SOLUTION INTRAVENOUS at 14:26

## 2024-07-29 ASSESSMENT — PAIN SCALES - GENERAL
PAINLEVEL_OUTOF10: 0
PAINLEVEL_OUTOF10: 0

## 2024-07-29 NOTE — PROGRESS NOTES
[facility] Peds/Adult Note                       Date: 2024    Name: Vangie Lu    MRN: [id number]         : 10/5/1929    1415 Patient arrives for Lasix IVP (2 of 3) without acute problems. Please see Epic for complete assessment and education provided.    Vital signs stable throughout and prior to discharge. Patient tolerated procedure well and was discharged without incident.  Patient/Daughter is aware of next [facility] appointment on 2024.  Appointment card give to the Patient/Daughter.       Ms. Lu's vitals were reviewed prior to and after treatment.   Patient Vitals for the past 12 hrs:   Temp Pulse Resp BP SpO2   24 1458 -- 82 20 104/69 --   24 1415 98.4 °F (36.9 °C) 81 20 111/74 97 %       Medications given:   Medications Administered         0.9 % sodium chloride infusion Admin Date  2024 Action  New Bag Dose  20 mL/hr Rate  20 mL/hr Route  IntraVENous Administered By  Benjie Colorado RN        furosemide (LASIX) 80 mg in sodium chloride 0.9 % 63 mL IVPB Admin Date  2024 Action  New Bag Dose  80 mg Rate  756 mL/hr Route  IntraVENous Administered By  Benjie Colorado RN        sodium chloride flush 0.9 % injection 5-40 mL Admin Date  2024 Action  Given Dose  10 mL Rate   Route  IntraVENous Administered By  Benjie Colorado RN          Ms. Lu tolerated the infusion, and had no complaints.    [facility]. Lu was [facility] in stable condition.     BENJIE COLORADO RN  2024  3:41 PM

## 2024-07-30 ENCOUNTER — HOME CARE VISIT (OUTPATIENT)
Facility: HOME HEALTH | Age: 89
End: 2024-07-30
Payer: MEDICARE

## 2024-07-30 PROCEDURE — G0299 HHS/HOSPICE OF RN EA 15 MIN: HCPCS

## 2024-07-31 VITALS
DIASTOLIC BLOOD PRESSURE: 72 MMHG | SYSTOLIC BLOOD PRESSURE: 123 MMHG | HEART RATE: 78 BPM | RESPIRATION RATE: 18 BRPM | OXYGEN SATURATION: 99 % | TEMPERATURE: 97.8 F

## 2024-08-01 ENCOUNTER — HOME CARE VISIT (OUTPATIENT)
Facility: HOME HEALTH | Age: 89
End: 2024-08-01
Payer: MEDICARE

## 2024-08-01 ENCOUNTER — HOSPITAL ENCOUNTER (OUTPATIENT)
Facility: HOSPITAL | Age: 89
Setting detail: INFUSION SERIES
Discharge: HOME | End: 2024-08-01
Payer: MEDICARE

## 2024-08-01 VITALS
SYSTOLIC BLOOD PRESSURE: 109 MMHG | HEART RATE: 77 BPM | TEMPERATURE: 98.5 F | RESPIRATION RATE: 18 BRPM | DIASTOLIC BLOOD PRESSURE: 70 MMHG | OXYGEN SATURATION: 98 %

## 2024-08-01 DIAGNOSIS — R60.9 EDEMA, UNSPECIFIED TYPE: Primary | ICD-10-CM

## 2024-08-01 PROCEDURE — 96374 THER/PROPH/DIAG INJ IV PUSH: CPT

## 2024-08-01 PROCEDURE — 6360000002 HC RX W HCPCS: Performed by: INTERNAL MEDICINE

## 2024-08-01 PROCEDURE — 2580000003 HC RX 258: Performed by: INTERNAL MEDICINE

## 2024-08-01 RX ORDER — SODIUM CHLORIDE 9 MG/ML
5-250 INJECTION, SOLUTION INTRAVENOUS PRN
OUTPATIENT
Start: 2024-08-01

## 2024-08-01 RX ORDER — SODIUM CHLORIDE 9 MG/ML
5-250 INJECTION, SOLUTION INTRAVENOUS PRN
Status: DISCONTINUED | OUTPATIENT
Start: 2024-08-01 | End: 2024-08-02 | Stop reason: HOSPADM

## 2024-08-01 RX ORDER — HEPARIN 100 UNIT/ML
500 SYRINGE INTRAVENOUS PRN
OUTPATIENT
Start: 2024-08-01

## 2024-08-01 RX ORDER — SODIUM CHLORIDE 0.9 % (FLUSH) 0.9 %
5-40 SYRINGE (ML) INJECTION PRN
Status: DISCONTINUED | OUTPATIENT
Start: 2024-08-01 | End: 2024-08-02 | Stop reason: HOSPADM

## 2024-08-01 RX ORDER — SODIUM CHLORIDE 0.9 % (FLUSH) 0.9 %
5-40 SYRINGE (ML) INJECTION PRN
OUTPATIENT
Start: 2024-08-01

## 2024-08-01 RX ADMIN — SODIUM CHLORIDE, PRESERVATIVE FREE 10 ML: 5 INJECTION INTRAVENOUS at 11:00

## 2024-08-01 RX ADMIN — FUROSEMIDE 80 MG: 10 INJECTION, SOLUTION INTRAMUSCULAR; INTRAVENOUS at 11:29

## 2024-08-01 RX ADMIN — SODIUM CHLORIDE 25 ML/HR: 9 INJECTION, SOLUTION INTRAVENOUS at 11:03

## 2024-08-01 ASSESSMENT — PAIN SCALES - GENERAL
PAINLEVEL_OUTOF10: 0
PAINLEVEL_OUTOF10: 0

## 2024-08-01 ASSESSMENT — ENCOUNTER SYMPTOMS: STOOL DESCRIPTION: FORMED

## 2024-08-01 NOTE — PROGRESS NOTES
[facility] Peds/Adult Note                       Date: 2024    Name: Vangie Lu    MRN: [id number]         : 10/5/1929    1050 Patient arrives for IV Lasix (3 of 3) without acute problems. Please see Epic for complete assessment and education provided.    Vital signs stable throughout and prior to discharge. Patient tolerated procedure well and was discharged without incident.  Patient/Daughter are aware of no further [facility] appointments @ this time & to follow up with healthcare provider for next appointment.       Ms. Lu's vitals were reviewed prior to and after treatment.   Patient Vitals for the past 12 hrs:   Temp Pulse Resp BP SpO2   24 1136 -- 77 18 109/70 --   24 1050 98.5 °F (36.9 °C) 83 18 98/60 98 %         Medications given:   Medications Administered         0.9 % sodium chloride infusion Admin Date  2024 Action  New Bag Dose  25 mL/hr Rate  25 mL/hr Route  IntraVENous Administered By  Benjie Colorado RN        furosemide (LASIX) 80 mg in sodium chloride 0.9 % 63 mL IVPB Admin Date  2024 Action  New Bag Dose  80 mg Rate  756 mL/hr Route  IntraVENous Administered By  Benjie Colorado RN        sodium chloride flush 0.9 % injection 5-40 mL Admin Date  2024 Action  Given Dose  10 mL Rate   Route  IntraVENous Administered By  Benjie Colorado RN          Ms. Lu tolerated the infusion, and had no complaints.    [facility]. Lu was [facility] in stable condition.       BENJIE COLORADO RN  2024  12:49 PM

## 2024-08-02 NOTE — HOME HEALTH
Subjective: Patient states she does not like the dressings.  Falls since last visit No(if yes complete the Fall Tracking Form and include bsrifallreport):   Caregiver involvement changes: N/a  Home health supplies by type and quantity ordered/delivered this visit include: N/A    Clinician asked if patient has had any physician contact since last home care visit and patient states: NO  Clinician asked if patient has any new or changed medications and patient states:  N/A   If Yes, were medications reconciled? N/A   Was the certifying physician notified of changes in medications? N/A no    Clinical assessment (what this visit means for the patient overall and need for ongoing skilled care) and progress or lack of progress towards SPECIFIC goals: Patient is 89 year old female who is confused, but lives with daughter and son in law. Daughter provides all care. No open areas to bilateral legs, and refuses to wear dressings. Patient requests for us to stop coming, daughter agrees. Educated on how to obtain HHA services if antyhing changes.   All questions/concerns addressed.         Written Teaching Material Utilized: N/A    Interdisciplinary communication with: MD for discharge.

## 2024-08-02 NOTE — HOME HEALTH
Pt with multiple comorbidities to include cesllulities to ble and wounds to left lat leg. Wounds have healed over and daughter is 100% able to perform wound care independantly. Pt now ready for dc.

## 2024-08-02 NOTE — HOME HEALTH
Pt with cellulitis to ble with open wounds to left lateral leg in need of sn for dressing changes and to assess/monitor/teach prn. Daughter performing dressings on days pt takes shower and is 100% accurate.

## 2024-08-16 ENCOUNTER — OFFICE VISIT (OUTPATIENT)
Age: 89
End: 2024-08-16
Payer: MEDICARE

## 2024-08-16 VITALS
HEART RATE: 80 BPM | DIASTOLIC BLOOD PRESSURE: 69 MMHG | BODY MASS INDEX: 30.37 KG/M2 | OXYGEN SATURATION: 97 % | RESPIRATION RATE: 14 BRPM | SYSTOLIC BLOOD PRESSURE: 112 MMHG | HEIGHT: 63 IN | WEIGHT: 171.4 LBS | TEMPERATURE: 98 F

## 2024-08-16 DIAGNOSIS — R60.0 BILATERAL LEG EDEMA: ICD-10-CM

## 2024-08-16 DIAGNOSIS — L03.116 CELLULITIS OF LEFT LOWER EXTREMITY: Primary | ICD-10-CM

## 2024-08-16 PROCEDURE — 99214 OFFICE O/P EST MOD 30 MIN: CPT | Performed by: STUDENT IN AN ORGANIZED HEALTH CARE EDUCATION/TRAINING PROGRAM

## 2024-08-16 PROCEDURE — 1123F ACP DISCUSS/DSCN MKR DOCD: CPT | Performed by: STUDENT IN AN ORGANIZED HEALTH CARE EDUCATION/TRAINING PROGRAM

## 2024-08-16 PROCEDURE — PBSHW PBB SHADOW CHARGE: Performed by: STUDENT IN AN ORGANIZED HEALTH CARE EDUCATION/TRAINING PROGRAM

## 2024-08-16 RX ORDER — DOXYCYCLINE HYCLATE 100 MG
100 TABLET ORAL 2 TIMES DAILY
Qty: 14 TABLET | Refills: 0 | Status: SHIPPED | OUTPATIENT
Start: 2024-08-16 | End: 2024-08-23

## 2024-08-16 RX ORDER — CEFTRIAXONE 1 G/1
1000 INJECTION, POWDER, FOR SOLUTION INTRAMUSCULAR; INTRAVENOUS ONCE
Status: COMPLETED | OUTPATIENT
Start: 2024-08-16 | End: 2024-08-16

## 2024-08-16 RX ADMIN — CEFTRIAXONE SODIUM 1000 MG: 500 INJECTION, POWDER, FOR SOLUTION INTRAMUSCULAR; INTRAVENOUS at 16:02

## 2024-08-16 ASSESSMENT — PATIENT HEALTH QUESTIONNAIRE - PHQ9
1. LITTLE INTEREST OR PLEASURE IN DOING THINGS: NOT AT ALL
2. FEELING DOWN, DEPRESSED OR HOPELESS: NOT AT ALL
SUM OF ALL RESPONSES TO PHQ QUESTIONS 1-9: 0
SUM OF ALL RESPONSES TO PHQ QUESTIONS 1-9: 0
SUM OF ALL RESPONSES TO PHQ9 QUESTIONS 1 & 2: 0
SUM OF ALL RESPONSES TO PHQ QUESTIONS 1-9: 0
SUM OF ALL RESPONSES TO PHQ QUESTIONS 1-9: 0

## 2024-08-16 NOTE — PROGRESS NOTES
Chief Complaint   Patient presents with    Wound Check     Wound on left leg x 3 months, now with redness ob both legs, and pain increased.     \"Have you been to the ER, urgent care clinic since your last visit?  Hospitalized since your last visit?\"    YES - When: approximately 2 months ago.  Where and Why: [facility] for wound on left leg.    “Have you seen or consulted any other health care providers outside of [facility] since your last visit?”    NO            Click Here for Release of Records Request             7/8/2024     8:26 AM   PHQ-9    Little interest or pleasure in doing things 0   Feeling down, depressed, or hopeless 0   PHQ-2 Score 0   PHQ-9 Total Score 0           Financial Resource Strain: Low Risk  (5/10/2024)    Overall Financial Resource Strain (CARDIA)     Difficulty of Paying Living Expenses: Not hard at all      Food Insecurity: No Food Insecurity (5/10/2024)    Hunger Vital Sign     Worried About Running Out of Food in the Last Year: Never true     Ran Out of Food in the Last Year: Never true          Health Maintenance Due   Topic Date Due    Hepatitis A vaccine (1 of 2 - Risk 2-dose series) Never done    Shingles vaccine (1 of 2) Never done    Hepatitis B vaccine (1 of 3 - Risk 3-dose series) Never done    Respiratory Syncytial Virus (RSV) Pregnant or age 60 yrs+ (1 - 1-dose 60+ series) Never done    Pneumococcal 65+ years Vaccine (2 of 2 - PPSV23 or PCV20) 05/11/2017    COVID-19 Vaccine (4 - 2023-24 season) 09/01/2023    Flu vaccine (1) 08/01/2024        
Frequency of Binge Drinking: Never   Financial Resource Strain: Low Risk  (5/10/2024)    Overall Financial Resource Strain (CARDIA)     Difficulty of Paying Living Expenses: Not hard at all   Food Insecurity: No Food Insecurity (5/10/2024)    Hunger Vital Sign     Worried About Running Out of Food in the Last Year: Never true     Ran Out of Food in the Last Year: Never true   Transportation Needs: No Transportation Needs (7/30/2024)    OASIS : Transportation     Lack of Transportation (Medical): No     Lack of Transportation (Non-Medical): No     Patient Unable or Declines to Respond: No   Physical Activity: Inactive (7/8/2024)    Exercise Vital Sign     Days of Exercise per Week: 0 days     Minutes of Exercise per Session: 0 min   Stress: Not on file   Social Connections: Feeling Socially Integrated (7/30/2024)    OASIS : Social Isolation     Frequency of experiencing loneliness or isolation: Never   Intimate Partner Violence: Not on file   Depression: Not at risk (8/16/2024)    PHQ-2     PHQ-2 Score: 0   Housing Stability: Unknown (5/10/2024)    Housing Stability Vital Sign     Unable to Pay for Housing in the Last Year: Not on file     Number of Places Lived in the Last Year: Not on file     Unstable Housing in the Last Year: No   Interpersonal Safety: Not At Risk (7/26/2024)    Interpersonal Safety Domain Source: IP Abuse Screening     Physical abuse: Denies     Verbal abuse: Denies     Emotional abuse: Denies     Financial abuse: Denies     Sexual abuse: Denies   Utilities: Not on file        Allergies  I personally reviewed.  Allergies   Allergen Reactions    Ace Inhibitors Cough    Penicillins Rash        Medications  I personally reviewed.  Current Outpatient Medications on File Prior to Visit   Medication Sig Dispense Refill    famotidine (PEPCID) 20 MG tablet Take 1 tablet by mouth daily as needed (indigestion)      metOLazone (ZAROXOLYN) 5 MG tablet Take 1 tablet by mouth three times a week 
No   Physical Activity: Inactive (7/8/2024)    Exercise Vital Sign    • Days of Exercise per Week: 0 days    • Minutes of Exercise per Session: 0 min   Stress: Not on file   Social Connections: Feeling Socially Integrated (7/30/2024)    OASIS : Social Isolation    • Frequency of experiencing loneliness or isolation: Never   Intimate Partner Violence: Not on file   Depression: Not at risk (7/8/2024)    PHQ-2    • PHQ-2 Score: 0   Housing Stability: Unknown (5/10/2024)    Housing Stability Vital Sign    • Unable to Pay for Housing in the Last Year: Not on file    • Number of Places Lived in the Last Year: Not on file    • Unstable Housing in the Last Year: No   Interpersonal Safety: Not At Risk (7/26/2024)    Interpersonal Safety Domain Source: IP Abuse Screening    • Physical abuse: Denies    • Verbal abuse: Denies    • Emotional abuse: Denies    • Financial abuse: Denies    • Sexual abuse: Denies   Utilities: Not on file        Allergies  I personally reviewed.  Allergies   Allergen Reactions   • Ace Inhibitors Cough   • Penicillins Rash        Medications  I personally reviewed.  Current Outpatient Medications on File Prior to Visit   Medication Sig Dispense Refill   • famotidine (PEPCID) 20 MG tablet Take 20 mg by mouth daily as needed (indigestion).     • metOLazone (ZAROXOLYN) 5 MG tablet Take 1 tablet by mouth three times a week. Monday, Wednesday, Friday     • Coenzyme Q10 300 MG CAPS Take 1 capsule by mouth daily.     • acetaminophen (TYLENOL) 500 MG tablet Take 500 mg by mouth every 6 hours as needed for Pain.     • MAGNESIUM GLYCINATE PO Take 400 mg by mouth in the morning and 400 mg in the evening.     • silver sulfADIAZINE (SILVADENE) 1 % cream Apply topically daily. (Patient taking differently: Apply 1 Film topically daily. wound bed) 50 g 2   • Gauze Pads & Dressings (BIOGUARD NON-ADHERENT DRESSING) 3\"X4\" PADS 1 each by Does not apply route in the morning and at bedtime 50 each 0   • Gauze Pads &

## 2024-08-20 ENCOUNTER — TELEPHONE (OUTPATIENT)
Age: 89
End: 2024-08-20

## 2024-08-20 NOTE — TELEPHONE ENCOUNTER
Called [facility]. Patient name andDOB verified. Informed them that patient can wait until Aug 26. Verbalized understanding.

## 2024-08-20 NOTE — TELEPHONE ENCOUNTER
Called Patient's daughter. Name and  confirmed.  She stated that wound looks good so she can wait until Aug 26.

## 2024-08-20 NOTE — TELEPHONE ENCOUNTER
[facility] called stating that they are able to take the patient back in, but they can not start until Aug 26. They are hoping to find out if this is okay with Dr. Baldwin.    Best call back number  [phone]

## 2024-08-26 ENCOUNTER — TELEPHONE (OUTPATIENT)
Age: 89
End: 2024-08-26

## 2024-08-26 NOTE — TELEPHONE ENCOUNTER
Oxana ovalle [facility] called to tell you that the patient cellulitis wound has been draining. They will continue to treat the patient left leg 3x a week. Pressure ulcer as well stage 2.      Call back at [phone]

## 2024-08-30 ENCOUNTER — TELEPHONE (OUTPATIENT)
Age: 89
End: 2024-08-30

## 2024-08-30 NOTE — TELEPHONE ENCOUNTER
Returning a call to Oxana from [facility] . Name and  of the patient's verified.  They want to inform us that cellulitis of the leg in the patient had been improving , they clean the wound  and changed dressing. No more drainage.

## 2024-09-06 ENCOUNTER — TELEPHONE (OUTPATIENT)
Age: 89
End: 2024-09-06

## 2024-09-06 NOTE — TELEPHONE ENCOUNTER
Minoo called from [facility] to say that the patient daughter refused wound care for the patient. The wounds are healed at this time. The patient is being discharged today.

## 2024-09-23 ENCOUNTER — TELEPHONE (OUTPATIENT)
Age: 89
End: 2024-09-23

## 2024-09-23 NOTE — TELEPHONE ENCOUNTER
Rivka from [facility] care called stated that pt was seen by dr. Manzo last month for a acute care. Rivka from [facility] stated that she fax over plan of care paperwork on 09/11/24 and is needing a sign and dated copy of the paperwork that was sent. Rivka from [facility] stated if there is any question please call.    Rivka from [facility] fax #  [phone]  Or  [phone]    BCBN [phone]

## 2024-10-28 ENCOUNTER — TELEPHONE (OUTPATIENT)
Age: 89
End: 2024-10-28

## 2024-10-28 NOTE — TELEPHONE ENCOUNTER
Pt daughter called wanting to speak with nurse or  pt daughter stated pt has open wounds on both legs with fluid come out. Pt   daughter stated that they want a appt with Nicolasa their was no opening this wednesday on 10 30 2024 I transfer pt daughter to nurse Salazar.

## 2024-10-30 ENCOUNTER — OFFICE VISIT (OUTPATIENT)
Age: 89
End: 2024-10-30
Payer: MEDICARE

## 2024-10-30 VITALS
RESPIRATION RATE: 16 BRPM | DIASTOLIC BLOOD PRESSURE: 67 MMHG | SYSTOLIC BLOOD PRESSURE: 101 MMHG | BODY MASS INDEX: 31.61 KG/M2 | OXYGEN SATURATION: 98 % | TEMPERATURE: 96.8 F | HEART RATE: 72 BPM | HEIGHT: 63 IN | WEIGHT: 178.4 LBS

## 2024-10-30 DIAGNOSIS — L03.116 CELLULITIS OF LEFT LOWER EXTREMITY: Primary | ICD-10-CM

## 2024-10-30 DIAGNOSIS — R60.0 BILATERAL LEG EDEMA: ICD-10-CM

## 2024-10-30 DIAGNOSIS — Z71.89 ACP (ADVANCE CARE PLANNING): ICD-10-CM

## 2024-10-30 DIAGNOSIS — B37.9 YEAST INFECTION: ICD-10-CM

## 2024-10-30 PROCEDURE — 99214 OFFICE O/P EST MOD 30 MIN: CPT | Performed by: STUDENT IN AN ORGANIZED HEALTH CARE EDUCATION/TRAINING PROGRAM

## 2024-10-30 RX ORDER — CEFTRIAXONE 1 G/1
1000 INJECTION, POWDER, FOR SOLUTION INTRAMUSCULAR; INTRAVENOUS ONCE
Status: COMPLETED | OUTPATIENT
Start: 2024-10-30 | End: 2024-10-30

## 2024-10-30 RX ORDER — NYSTATIN 100000 U/G
CREAM TOPICAL
Qty: 30 G | Refills: 2 | Status: SHIPPED | OUTPATIENT
Start: 2024-10-30

## 2024-10-30 RX ORDER — DOXYCYCLINE HYCLATE 100 MG
100 TABLET ORAL 2 TIMES DAILY
Qty: 20 TABLET | Refills: 0 | Status: SHIPPED | OUTPATIENT
Start: 2024-10-30 | End: 2024-11-09

## 2024-10-30 RX ORDER — SILVER SULFADIAZINE 10 MG/G
CREAM TOPICAL
Qty: 50 G | Refills: 2 | Status: SHIPPED | OUTPATIENT
Start: 2024-10-30

## 2024-10-30 RX ADMIN — CEFTRIAXONE SODIUM 1000 MG: 500 INJECTION, POWDER, FOR SOLUTION INTRAMUSCULAR; INTRAVENOUS at 11:11

## 2024-10-30 ASSESSMENT — PATIENT HEALTH QUESTIONNAIRE - PHQ9: DEPRESSION UNABLE TO ASSESS: FUNCTIONAL CAPACITY MOTIVATION LIMITS ACCURACY

## 2024-10-30 NOTE — PROGRESS NOTES
Chief Complaint   Patient presents with    Wound Infection     Recurrent open wound both lower extremities for over a week, leaking slightly yellowish fluid, non offensive.    Diaper Rash     In groin for about a week, causing discomfort.     \"Have you been to the ER, urgent care clinic since your last visit?  Hospitalized since your last visit?\"    NO    “Have you seen or consulted any other health care providers outside of [facility] since your last visit?”    YES - When: approximately 2  weeks ago.  Where and Why: [facility], nephrologist Dr. Clark - follow up.            Click Here for Release of Records Request             8/16/2024     3:02 PM   PHQ-9    Little interest or pleasure in doing things 0   Feeling down, depressed, or hopeless 0   PHQ-2 Score 0   PHQ-9 Total Score 0           Financial Resource Strain: Low Risk  (5/10/2024)    Overall Financial Resource Strain (CARDIA)     Difficulty of Paying Living Expenses: Not hard at all      Food Insecurity: No Food Insecurity (5/10/2024)    Hunger Vital Sign     Worried About Running Out of Food in the Last Year: Never true     Ran Out of Food in the Last Year: Never true          Health Maintenance Due   Topic Date Due    Hepatitis A vaccine (1 of 2 - Risk 2-dose series) Never done    Shingles vaccine (1 of 2) Never done    Hepatitis B vaccine (1 of 3 - Risk 3-dose series) Never done    Respiratory Syncytial Virus (RSV) Pregnant or age 60 yrs+ (1 - 1-dose 60+ series) Never done    Pneumococcal 65+ years Vaccine (2 of 2 - PPSV23 or PCV20) 05/11/2017    Flu vaccine (1) 08/01/2024    COVID-19 Vaccine (4 - 2023-24 season) 09/01/2024

## 2024-10-30 NOTE — PROGRESS NOTES
Assessment/Plan:     1. Cellulitis of left lower extremity-acute, recurrent in the setting of chronic lower extremity edema.  Will treat with ceftriaxone and 10-day course of doxycycline.  Will also order Silvadene and home health for wound management.  -     silver sulfADIAZINE (SILVADENE) 1 % cream; Apply topically daily., Disp-50 g, R-2, Normal  -     cefTRIAXone (ROCEPHIN) injection 1,000 mg; 1,000 mg, IntraMUSCular, ONCE, 1 dose, On Wed 10/30/24 at 1100Antimicrobial Indications: Skin and Soft Tissue InfectionSkin duration of therapy: OtherOther Skin and Soft Tissue Infection Duration: 1Reconstitute with 3.6 mL 1% lidocaine or sterile water.  -     doxycycline hyclate (VIBRA-TABS) 100 MG tablet; Take 1 tablet by mouth 2 times daily for 10 days, Disp-20 tablet, R-0Normal  -     [facility] by [facility]  2. Yeast infection-acute, unstable.  Some improvement with Desitin at home but not resolved so will treat with nystatin.  -     nystatin (MYCOSTATIN) 749549 UNIT/GM cream; Apply topically 2 times daily., Disp-30 g, R-2, Normal  3. Bilateral leg edema-chronic, unstable.  Patient with CHF and severe CKD, making good treatment difficult.  Patient follows with nephrology and is on Lasix and metolazone.  Discussed extensively with patient and family ways to manage this including compression stockings and elevation but patient is unable to tolerate her legs.  Also offered referral to lymphedema clinic but patient and family declined at this time.  -     [facility] by [facility]  4. ACP (advance care planning)  -     DNR       Follow up:     Return in about 3 months (around 1/30/2025) for Chronic conditions follow up.      I have reviewed patient medical and social history and medications.  I have reviewed pertinent labs results and other data. I have discussed the diagnosis with the patient and the intended plan as seen in the above orders. The patient has received an after-visit

## 2024-10-30 NOTE — PATIENT INSTRUCTIONS

## 2024-11-01 ENCOUNTER — TELEPHONE (OUTPATIENT)
Age: 89
End: 2024-11-01

## 2024-11-01 NOTE — TELEPHONE ENCOUNTER
Abbey mccann from [facility] stated that they will be doing the start of care tomorrow     Best call back # [phone]

## 2024-11-01 NOTE — TELEPHONE ENCOUNTER
[facility] stated that they have been trying to call pt to schedule pt appt do to the referral Dr Anais jessica for pt. [facility] stated they been call calling pt several time and have LVM but they still have hear nothing. If pt call asking about scheduling and appt with them please advise them to call [facility].        BCBN [phone]

## 2024-11-05 ENCOUNTER — TELEPHONE (OUTPATIENT)
Age: 89
End: 2024-11-05

## 2024-11-05 NOTE — TELEPHONE ENCOUNTER
KATHERINE [facility] called stating that pt daughter is refuse a visit they are need wound care order for pt and KATHERINE [facility] has further question about pt and would like to speak to a nurse      BCBN [phone]

## 2024-11-05 NOTE — TELEPHONE ENCOUNTER
Anmol chu [facility] called again I informed that message was sent and we are waiting on order from provider.

## 2024-11-05 NOTE — TELEPHONE ENCOUNTER
Spoke with KATHERINE from [facility]. She stated that they need wound care order for this patient. Patient's daughter refused home care without wound care order.    Please place wound care order and fax it on [phone].

## 2024-11-07 NOTE — TELEPHONE ENCOUNTER
KATHERINE from [facility] called stating that she is writing wound care order and is asking for Dr. Manzo to sign order ASAP so KATHERINE can order supplies.        BCBN [phone]

## 2024-11-14 ENCOUNTER — TELEPHONE (OUTPATIENT)
Age: 89
End: 2024-11-14

## 2024-11-14 DIAGNOSIS — T14.8XXA CHRONIC WOUND: ICD-10-CM

## 2024-11-14 DIAGNOSIS — L03.116 CELLULITIS OF LEFT LOWER EXTREMITY: Primary | ICD-10-CM

## 2024-11-14 NOTE — TELEPHONE ENCOUNTER
Pt daughter call states that pt has wound on both legs, wound is getting a lot worst pt daughter is asking for a referral to wound care clinic for pt. Pt was seen by last provider  provider order home health nurse. Pt states nurse came out and stated to pt daughter that pt wound is getting worst and should get a referral to wound care clinic for pt.        BCBN [phone]